# Patient Record
Sex: FEMALE | Race: WHITE | NOT HISPANIC OR LATINO | Employment: OTHER | URBAN - METROPOLITAN AREA
[De-identification: names, ages, dates, MRNs, and addresses within clinical notes are randomized per-mention and may not be internally consistent; named-entity substitution may affect disease eponyms.]

---

## 2017-01-05 ENCOUNTER — OFFICE VISIT (OUTPATIENT)
Dept: OBSTETRICS AND GYNECOLOGY | Facility: CLINIC | Age: 67
End: 2017-01-05
Payer: MEDICARE

## 2017-01-05 VITALS
BODY MASS INDEX: 19.57 KG/M2 | HEIGHT: 64 IN | SYSTOLIC BLOOD PRESSURE: 116 MMHG | DIASTOLIC BLOOD PRESSURE: 68 MMHG | WEIGHT: 114.63 LBS

## 2017-01-05 DIAGNOSIS — Z78.0 MENOPAUSE: ICD-10-CM

## 2017-01-05 DIAGNOSIS — Z11.51 SCREENING FOR HPV (HUMAN PAPILLOMAVIRUS): ICD-10-CM

## 2017-01-05 DIAGNOSIS — Z12.39 BREAST CANCER SCREENING: ICD-10-CM

## 2017-01-05 DIAGNOSIS — M85.80 OSTEOPENIA: ICD-10-CM

## 2017-01-05 DIAGNOSIS — Z01.419 ROUTINE GYNECOLOGICAL EXAMINATION: Primary | ICD-10-CM

## 2017-01-05 PROCEDURE — G0101 CA SCREEN;PELVIC/BREAST EXAM: HCPCS | Mod: S$GLB,,, | Performed by: OBSTETRICS & GYNECOLOGY

## 2017-01-05 PROCEDURE — 88175 CYTOPATH C/V AUTO FLUID REDO: CPT

## 2017-01-05 PROCEDURE — 87624 HPV HI-RISK TYP POOLED RSLT: CPT

## 2017-01-05 PROCEDURE — 99999 PR PBB SHADOW E&M-EST. PATIENT-LVL III: CPT | Mod: PBBFAC,,, | Performed by: OBSTETRICS & GYNECOLOGY

## 2017-01-05 NOTE — PROGRESS NOTES
Chief Complaint   Patient presents with    Well Woman     States her last pap showed scar tissue on the cervix. Has signed a release for records from previous MD.        History of Present Illness: Rosalia Fenrández is a 66 y.o. female that presents today 1/5/2017 for well gyn visit.    Past Medical History   Diagnosis Date    Colon polyps     Diverticulitis     Endometriosis     Infertility, female     Melanoma     MVP (mitral valve prolapse)     Osteoarthritis        Past Surgical History   Procedure Laterality Date    Hysterectomy       Patel, with MESH TAHBSO    Tonsillectomy      Melanoma removal      Hernia repair       bladder hernia    Endometriosis      Colonoscopy       due 2018    Dexa  2016       Current Outpatient Prescriptions   Medication Sig Dispense Refill    ascorbic acid (VITAMIN C) 500 MG tablet Take 500 mg by mouth once daily.      CALCIUM CITRATE/VITAMIN D3 (CALCIUM CITRATE + ORAL) Take 1 tablet by mouth Daily.      ketoconazole (NIZORAL) 2 % cream AAA nasal dryness bid prn 30 g 3    krill oil 500 mg Cap Take 2 capsules by mouth Daily.      LACTOBACILLUS ACIDOPHILUS (PROBIOTIC ORAL) Take by mouth.      meloxicam (MOBIC) 7.5 MG tablet Take 7.5 mg by mouth daily as needed.       metronidazole 0.75% (METROCREAM) 0.75 % Crea AAA face bid 60 g 3    multivitamin (THERAGRAN) per tablet Take 1 tablet by mouth once daily.      VIT C/VIT E/LUTEIN/MIN/OMEGA-3 (OCUVITE ORAL) Take by mouth.       No current facility-administered medications for this visit.        Review of patient's allergies indicates:   Allergen Reactions    Prednisone Other (See Comments)     Blurry vision, not sure if dose was too much for her.       Family History   Problem Relation Age of Onset    Thyroid disease Mother     Dementia Mother     Hypertension Mother     Arthritis Father     Cancer Sister      breast       Social History     Social History    Marital status:      Spouse name: N/A  "   Number of children: N/A    Years of education: N/A     Social History Main Topics    Smoking status: Never Smoker    Smokeless tobacco: Never Used    Alcohol use 12.6 oz/week     21 Glasses of wine per week    Drug use: No    Sexual activity: Yes     Partners: Male     Other Topics Concern    None     Social History Narrative       OB History    Para Term  AB SAB TAB Ectopic Multiple Living   0 0 0 0 0 0 0 0 0 0             Review of Symptoms:  GENERAL: Denies weight gain or weight loss. Feeling well overall.   SKIN: Denies rash or lesions.   HEAD: Denies head injury or headache.   NODES: Denies enlarged lymph nodes.   CHEST: Denies chest pain or shortness of breath.   CARDIOVASCULAR: Denies palpitations or left sided chest pain.   ABDOMEN: No abdominal pain, constipation, diarrhea, nausea, vomiting or rectal bleeding.   URINARY: No frequency, dysuria, hematuria, or burning on urination.  HEMATOLOGIC: No easy bruisability or excessive bleeding.   MUSCULOSKELETAL: Denies joint pain or swelling.     Visit Vitals    /68    Ht 5' 4" (1.626 m)    Wt 52 kg (114 lb 10.2 oz)     Physical Exam:  APPEARANCE: Well nourished, well developed, in no acute distress.  SKIN: Normal skin turgor, no lesions.  NECK: Neck symmetric without masses   RESPIRATORY: Normal respiratory effort with no retractions or use of accessory muscles  CARDIOVASCULAR: Peripheral vascular system with no swelling no varicosities and palpation of pulses normal  LYMPHATIC: No enlargements of the lymph nodes noted in the neck, axillae, or groin  ABDOMEN: Soft. No tenderness or masses. No hepatosplenomegaly. No hernias.  BREASTS: Symmetrical, no skin changes or visible lesions. No palpable masses, nipple discharge or adenopathy bilaterally.  PELVIC: Normal external female genitalia without lesions. Normal hair distribution. Adequate perineal body, normal urethral meatus. Urethra with no masses.  Bladder nontender. Vagina moist " and well rugated without lesions or discharge with right apex with granulation tissue silver nitrate applied.  Adnexa without masses or tenderness. Urethra and bladder normal. PAP DONE  EXTREMITIES: No clubbing cyanosis or edema.    ASSESSMENT/PLAN:  Routine gynecological examination  -     Liquid-based pap smear, screening    Screening for HPV (human papillomavirus)  -     HPV DNA probe, amplified    Menopause    Osteopenia    Breast cancer screening  -     Mammo Digital Screening Bilat With CAD; Future; Expected date: 1/5/17          Patient was counseled today on Pap guidelines, recommendation for pelvic exams every 2 years, mammograms every other year after the age of 40 and annually after the age of 50, Colonoscopy after the age of 50, Dexa Bone Scan and calcium and vitamin D supplementation in menopause and to see her PCP for other health maintenance.   FOLLOW-UP:prn

## 2017-01-05 NOTE — MR AVS SNAPSHOT
[facility]  [address] [place] [address]  Phone: [phone]                  Rosalia Fernández   2017 10:20 AM   Office Visit    Description:  Female : 1950   Provider:  Erinn Aragon MD   Department:  [facility]           Reason for Visit     Well Woman           Diagnoses this Visit        Comments    Routine gynecological examination    -  Primary     Screening for HPV (human papillomavirus)                To Do List           Goals (5 Years of Data)     None      [facility]     [facility] On Call Nurse Care Line -  Assistance  Registered nurses in the [facility] provide clinical advisement, health education, appointment booking, and other advisory services.  Call for this free service at [phone].             Medications           Message regarding Medications     Verify the changes and/or additions to your medication regime listed below are the same as discussed with your clinician today.  If any of these changes or additions are incorrect, please notify your healthcare provider.             Verify that the below list of medications is an accurate representation of the medications you are currently taking.  If none reported, the list may be blank. If incorrect, please contact your healthcare provider. Carry this list with you in case of emergency.           Current Medications     ascorbic acid (VITAMIN C) 500 MG tablet Take 500 mg by mouth once daily.    CALCIUM CITRATE/VITAMIN D3 (CALCIUM CITRATE + ORAL) Take 1 tablet by mouth Daily.    ketoconazole (NIZORAL) 2 % cream AAA nasal dryness bid prn    krill oil 500 mg Cap Take 2 capsules by mouth Daily.    LACTOBACILLUS ACIDOPHILUS (PROBIOTIC ORAL) Take by mouth.    meloxicam (MOBIC) 7.5 MG tablet Take 7.5 mg by mouth daily as needed.     metronidazole 0.75% (METROCREAM) 0.75 % Crea AAA face bid    multivitamin (THERAGRAN) per tablet Take 1 tablet by mouth once daily.    VIT C/VIT  "E/LUTEIN/MIN/OMEGA-3 (OCUVITE ORAL) Take by mouth.           Clinical Reference Information           Vital Signs - Last Recorded  Most recent update: 1/5/2017 10:26 AM by Mariangel Miller LPN    BP Ht Wt BMI       116/68 5' 4" (1.626 m) 52 kg (114 lb 10.2 oz) 19.68 kg/m2       Blood Pressure          Most Recent Value    BP  116/68      Allergies as of 1/5/2017     Prednisone      Immunizations Administered on Date of Encounter - 1/5/2017     None      Orders Placed During Today's Visit      Normal Orders This Visit    HPV DNA probe, amplified     Liquid-based pap smear, screening       "

## 2017-01-18 ENCOUNTER — TELEPHONE (OUTPATIENT)
Dept: OBSTETRICS AND GYNECOLOGY | Facility: CLINIC | Age: 67
End: 2017-01-18

## 2017-01-18 NOTE — TELEPHONE ENCOUNTER
----- Message from RT Edelmira sent at 1/18/2017  4:00 PM CST -----  Contact: Gauri,  EXT [id number] Send Out Lab  Gauri  EXT [id number] Send Out Lab, requesting a call back concerning a test cancellation, thanks.

## 2017-01-25 DIAGNOSIS — Z12.31 VISIT FOR SCREENING MAMMOGRAM: Primary | ICD-10-CM

## 2017-01-27 ENCOUNTER — TELEPHONE (OUTPATIENT)
Dept: OBSTETRICS AND GYNECOLOGY | Facility: CLINIC | Age: 67
End: 2017-01-27

## 2017-01-27 NOTE — TELEPHONE ENCOUNTER
----- Message from Hortensia Zacarias sent at 1/27/2017 11:55 AM CST -----  Contact: [phone]- [phone]  Patient requesting for orders for mammogram to be faxed to [facility].  Thanks!

## 2017-03-13 ENCOUNTER — TELEPHONE (OUTPATIENT)
Dept: DERMATOLOGY | Facility: CLINIC | Age: 67
End: 2017-03-13

## 2017-03-13 NOTE — TELEPHONE ENCOUNTER
Spoke with pt, notified that will we give her a call if any cancellations. Pt verbally understands

## 2017-03-13 NOTE — TELEPHONE ENCOUNTER
----- Message from Jefe Rodriguez sent at 3/13/2017 11:15 AM CDT -----  Pt called stated that she has a spot on her face that she want to get checked/stated has and appt for 5/15 but would like to see her before then/pls call back at [phone]

## 2017-06-15 PROBLEM — G47.62 LEG CRAMPS, SLEEP RELATED: Status: ACTIVE | Noted: 2017-06-15

## 2017-06-19 ENCOUNTER — OFFICE VISIT (OUTPATIENT)
Dept: DERMATOLOGY | Facility: CLINIC | Age: 67
End: 2017-06-19
Payer: MEDICARE

## 2017-06-19 VITALS — HEIGHT: 64 IN | WEIGHT: 113 LBS | BODY MASS INDEX: 19.29 KG/M2

## 2017-06-19 DIAGNOSIS — Z85.820 HISTORY OF MELANOMA: Primary | ICD-10-CM

## 2017-06-19 DIAGNOSIS — L82.0 INFLAMED SEBORRHEIC KERATOSIS: ICD-10-CM

## 2017-06-19 DIAGNOSIS — L57.0 ACTINIC KERATOSES: ICD-10-CM

## 2017-06-19 DIAGNOSIS — Z12.83 SKIN CANCER SCREENING: ICD-10-CM

## 2017-06-19 PROCEDURE — 1126F AMNT PAIN NOTED NONE PRSNT: CPT | Mod: S$GLB,,, | Performed by: DERMATOLOGY

## 2017-06-19 PROCEDURE — 99999 PR PBB SHADOW E&M-EST. PATIENT-LVL II: CPT | Mod: PBBFAC,,, | Performed by: DERMATOLOGY

## 2017-06-19 PROCEDURE — 1159F MED LIST DOCD IN RCRD: CPT | Mod: S$GLB,,, | Performed by: DERMATOLOGY

## 2017-06-19 PROCEDURE — 99214 OFFICE O/P EST MOD 30 MIN: CPT | Mod: 25,S$GLB,, | Performed by: DERMATOLOGY

## 2017-06-19 PROCEDURE — 17003 DESTRUCT PREMALG LES 2-14: CPT | Mod: S$GLB,,, | Performed by: DERMATOLOGY

## 2017-06-19 PROCEDURE — 17000 DESTRUCT PREMALG LESION: CPT | Mod: S$GLB,,, | Performed by: DERMATOLOGY

## 2017-06-19 NOTE — PROGRESS NOTES
"  Subjective:       Patient ID:  Rosalia Fernández is a 66 y.o. female who presents for No chief complaint on file.    Last seen 11/14/2016. Presents today for 6 month follow up.  Has a spot on right face by ear that felt "bumpy" but cannot see and has no symptoms.  Also a spot on back that is same. No symptoms and no treatment.    Hx of melanoma (0.20 mm depth), 3 years ago, left lateral ankle, s/p WLE with skin graft repair- Dr Arguello, in [place].  Sister dx with breast cancer, July 2015. Denies family history melanoma or abnormal nevi.      History AKs, BCC left forehead 1/2015  History mildly atypical nevus left dorsal arm s/p excision 9/2014  moderately dysplastic nevus right lower abdomen s/p excision 2014.  Right lower calf moderate to severely DN s/p excision 3/2014  Mildly atypical nevus right calf, clear on biopsy 2/2014  moderate to focally severe atypical nevus left lateral leg s/p excision 2013  left shoulder mildly atypical excised 3/2013  Numerous ISK s/p biopsy          Onychomycosis  -     Fungal culture , skin, hair, or nails  Ok to continue kerasal for now--Continues use and much improved.    And lesion to back which was benign.    FINAL PATHOLOGIC DIAGNOSIS   1. Skin, left back, shave biopsy:   - MELANOCYTIC NEVUS, INTRADERMAL TYPE.   MICROSCOPIC DESCRIPTION: Sections show nests and cords of melanocytes within the dermis showing   architectural symmetry, maturation, and no cytological atypia.   Diagnosed by: Cookie Orozco M.D.   (Electronically Signed: 2016-11-16 13:56:03)                Review of Systems   Constitutional: Negative for weight loss, weight gain and fatigue.   Skin: Positive for daily sunscreen use, activity-related sunscreen use and wears hat.   Hematologic/Lymphatic: Does not bruise/bleed easily.        Objective:    Physical Exam   Skin:                      Diagram Legend     Erythematous scaling macule/papule c/w actinic keratosis       Vascular papule c/w angioma      " Pigmented verrucoid papule/plaque c/w seborrheic keratosis      Yellow umbilicated papule c/w sebaceous hyperplasia      Irregularly shaped tan macule c/w lentigo     1-2 mm smooth white papules consistent with Milia      Movable subcutaneous cyst with punctum c/w epidermal inclusion cyst      Subcutaneous movable cyst c/w pilar cyst      Firm pink to brown papule c/w dermatofibroma      Pedunculated fleshy papule(s) c/w skin tag(s)      Evenly pigmented macule c/w junctional nevus     Mildly variegated pigmented, slightly irregular-bordered macule c/w mildly atypical nevus      Flesh colored to evenly pigmented papule c/w intradermal nevus       Pink pearly papule/plaque c/w basal cell carcinoma      Erythematous hyperkeratotic cursted plaque c/w SCC      Surgical scar with no sign of skin cancer recurrence      Open and closed comedones      Inflammatory papules and pustules      Verrucoid papule consistent consistent with wart     Erythematous eczematous patches and plaques     Dystrophic onycholytic nail with subungual debris c/w onychomycosis     Umbilicated papule    Erythematous-base heme-crusted tan verrucoid plaque consistent with inflamed seborrheic keratosis     Erythematous Silvery Scaling Plaque c/w Psoriasis     See annotation      Assessment / Plan:        History of melanoma  Area of previous melanoma examined. Site well healed with no signs of recurrence.    Total body skin examination performed today including at least 12 points as noted in physical examination. No lesions suspicious for malignancy noted.      Actinic keratoses  Cryosurgery Procedure Note    Verbal consent from the patient is obtained and the patient is aware of the precancerous quality and need for treatment of these lesions. Liquid nitrogen cryosurgery is applied to the 3 actinic keratoses, as detailed in the physical exam, to produce a freeze injury. The patient is aware that blisters may form and is instructed on wound care  with gentle cleansing and use of vaseline ointment to keep moist until healed. The patient is supplied a handout on cryosurgery and is instructed to call if lesions do not completely resolve. Discussed risk postinflammatory pigmentary changes.       Inflamed seborrheic keratosis- scalp and back  Cryosurgery procedure note:    Verbal consent from the patient is obtained. Liquid nitrogen cryosurgery is applied to 2 lesions to produce a freeze injury. The patient is aware that blisters may form and is instructed on wound care with gentle cleansing and use of vaseline ointment to keep moist until healed. The patient is supplied a handout on cryosurgery and is instructed to call if lesions do not completely resolve. Risk of dyspigmentation discussed.       Skin cancer screening  Area of previous melanoma examined. Site well healed with no signs of recurrence.    Total body skin examination performed today including at least 12 points as noted in physical examination. No lesions suspicious for malignancy noted.             Return in about 6 months (around 12/19/2017).

## 2017-12-04 PROBLEM — E55.9 VITAMIN D DEFICIENCY: Status: ACTIVE | Noted: 2017-12-04

## 2017-12-04 PROBLEM — M79.672 FOOT PAIN, BILATERAL: Status: ACTIVE | Noted: 2017-12-04

## 2017-12-04 PROBLEM — M79.671 FOOT PAIN, BILATERAL: Status: ACTIVE | Noted: 2017-12-04

## 2017-12-08 ENCOUNTER — TELEPHONE (OUTPATIENT)
Dept: DERMATOLOGY | Facility: CLINIC | Age: 67
End: 2017-12-08

## 2017-12-08 NOTE — TELEPHONE ENCOUNTER
----- Message from Yasmeen Donaldson sent at 12/8/2017 11:33 AM CST -----  Contact: self  Pt called to request an appt due to 6 mo f/u. (appt books not open) Pt is normally seen at the [facility] location in [place]. Pt would like the first available appt.    Pt can be reached at [phone].

## 2017-12-08 NOTE — TELEPHONE ENCOUNTER
Informed currently no derm in [place].  We cannot currently book.  Will keep on list for bridger and patient will call back end of December.

## 2018-01-15 ENCOUNTER — OFFICE VISIT (OUTPATIENT)
Dept: DERMATOLOGY | Facility: CLINIC | Age: 68
End: 2018-01-15
Payer: MEDICARE

## 2018-01-15 VITALS — HEIGHT: 64 IN | BODY MASS INDEX: 19.46 KG/M2 | WEIGHT: 114 LBS

## 2018-01-15 DIAGNOSIS — L82.1 SEBORRHEIC KERATOSIS: Primary | ICD-10-CM

## 2018-01-15 DIAGNOSIS — L81.4 LENTIGINES: ICD-10-CM

## 2018-01-15 DIAGNOSIS — D18.00 ANGIOMA: ICD-10-CM

## 2018-01-15 DIAGNOSIS — Z85.820 HISTORY OF MELANOMA: ICD-10-CM

## 2018-01-15 DIAGNOSIS — L85.3 XEROSIS CUTIS: ICD-10-CM

## 2018-01-15 PROCEDURE — 99214 OFFICE O/P EST MOD 30 MIN: CPT | Mod: S$GLB,,, | Performed by: DERMATOLOGY

## 2018-01-15 PROCEDURE — 99999 PR PBB SHADOW E&M-EST. PATIENT-LVL II: CPT | Mod: PBBFAC,,, | Performed by: DERMATOLOGY

## 2018-01-15 NOTE — PROGRESS NOTES
Subjective:       Patient ID:  Rosalia Fernández is a 67 y.o. female who presents for   Chief Complaint   Patient presents with    Skin Check    Dry Skin     Patient presents today for 6 month follow up skin check. No new or concerning moles today. She complains of dry skin for several years- using otc Neutrogena cream with spf 50 & Jordan retinol cream daily     Hx of melanoma (0.20 mm depth), 3 years ago, left lateral ankle, s/p WLE with skin graft repair- Dr Arguello, in [place].  Sister dx with breast cancer, July 2015. Denies family history melanoma or abnormal nevi.      History AKs, BCC left forehead 1/2015  History mildly atypical nevus left dorsal arm s/p excision 9/2014  moderately dysplastic nevus right lower abdomen s/p excision 2014.  Right lower calf moderate to severely DN s/p excision 3/2014  Mildly atypical nevus right calf, clear on biopsy 2/2014  moderate to focally severe atypical nevus left lateral leg s/p excision 2013  left shoulder mildly atypical excised 3/2013           Past Medical History:   Diagnosis Date    Colon polyps     Diverticulitis     Endometriosis     Infertility, female     Melanoma     MVP (mitral valve prolapse)     Osteoarthritis      Review of Systems   Skin: Positive for dry skin, daily sunscreen use and activity-related sunscreen use. Negative for tendency to form keloidal scars and recent sunburn.   Hematologic/Lymphatic: Does not bruise/bleed easily.        Objective:    Physical Exam   Constitutional: She appears well-developed and well-nourished.   Neurological: She is alert and oriented to person, place, and time.   Psychiatric: She has a normal mood and affect.   Skin:   Areas Examined (abnormalities noted in diagram):   Scalp / Hair Palpated and Inspected  Head / Face Inspection Performed  Neck Inspection Performed  Chest / Axilla Inspection Performed  Abdomen Inspection Performed  Back Inspection Performed  RUE Inspected  LUE Inspection Performed  RLE  Inspected  LLE Inspection Performed  Nails and Digits Inspection Performed                   Diagram Legend     Erythematous scaling macule/papule c/w actinic keratosis       Vascular papule c/w angioma      Pigmented verrucoid papule/plaque c/w seborrheic keratosis      Yellow umbilicated papule c/w sebaceous hyperplasia      Irregularly shaped tan macule c/w lentigo     1-2 mm smooth white papules consistent with Milia      Movable subcutaneous cyst with punctum c/w epidermal inclusion cyst      Subcutaneous movable cyst c/w pilar cyst      Firm pink to brown papule c/w dermatofibroma      Pedunculated fleshy papule(s) c/w skin tag(s)      Evenly pigmented macule c/w junctional nevus     Mildly variegated pigmented, slightly irregular-bordered macule c/w mildly atypical nevus      Flesh colored to evenly pigmented papule c/w intradermal nevus       Pink pearly papule/plaque c/w basal cell carcinoma      Erythematous hyperkeratotic cursted plaque c/w SCC      Surgical scar with no sign of skin cancer recurrence      Open and closed comedones      Inflammatory papules and pustules      Verrucoid papule consistent consistent with wart     Erythematous eczematous patches and plaques     Dystrophic onycholytic nail with subungual debris c/w onychomycosis     Umbilicated papule    Erythematous-base heme-crusted tan verrucoid plaque consistent with inflamed seborrheic keratosis     Erythematous Silvery Scaling Plaque c/w Psoriasis     See annotation      Assessment / Plan:        Seborrheic keratosis  These are benign inherited growths without a malignant potential. Reassurance given to patient. No treatment is necessary.     Angioma  This is a benign vascular lesion. Reassurance given. No treatment required.     Lentigines  These are benign hyperpigmented sun induced lesions. Daily sun protection will reduce the number of new lesions    History of melanoma  Area of previous melanoma examined. Site well healed with no  signs of recurrence.  Total body skin examination performed today including at least 12 points as noted in physical examination. No lesions suspicious for malignancy noted.    Xerosis cutis  Encouraged liberal use of moisturizer as this can contribute to pruritis  Recommended Cetaphil cream daily and throughout the day as needed           Follow-up in about 6 months (around 7/15/2018).

## 2018-06-19 PROBLEM — M79.671 FOOT PAIN, BILATERAL: Status: RESOLVED | Noted: 2017-12-04 | Resolved: 2018-06-19

## 2018-06-19 PROBLEM — M79.672 FOOT PAIN, BILATERAL: Status: RESOLVED | Noted: 2017-12-04 | Resolved: 2018-06-19

## 2018-08-28 ENCOUNTER — OFFICE VISIT (OUTPATIENT)
Dept: DERMATOLOGY | Facility: CLINIC | Age: 68
End: 2018-08-28
Payer: MEDICARE

## 2018-08-28 VITALS — BODY MASS INDEX: 19.81 KG/M2 | WEIGHT: 116 LBS | HEIGHT: 64 IN

## 2018-08-28 DIAGNOSIS — L71.9 ROSACEA: ICD-10-CM

## 2018-08-28 DIAGNOSIS — Z87.898 HISTORY OF ATYPICAL NEVUS: ICD-10-CM

## 2018-08-28 DIAGNOSIS — L71.9 ACNE ROSACEA: ICD-10-CM

## 2018-08-28 DIAGNOSIS — L82.0 INFLAMED SEBORRHEIC KERATOSIS: ICD-10-CM

## 2018-08-28 DIAGNOSIS — Z85.820 HISTORY OF MELANOMA: Primary | ICD-10-CM

## 2018-08-28 DIAGNOSIS — Z12.83 SKIN CANCER SCREENING: ICD-10-CM

## 2018-08-28 PROCEDURE — 99214 OFFICE O/P EST MOD 30 MIN: CPT | Mod: 25,S$GLB,, | Performed by: DERMATOLOGY

## 2018-08-28 PROCEDURE — 17110 DESTRUCTION B9 LES UP TO 14: CPT | Mod: S$GLB,,, | Performed by: DERMATOLOGY

## 2018-08-28 PROCEDURE — 99999 PR PBB SHADOW E&M-EST. PATIENT-LVL III: CPT | Mod: PBBFAC,,, | Performed by: DERMATOLOGY

## 2018-08-28 RX ORDER — METRONIDAZOLE 7.5 MG/G
CREAM TOPICAL
Qty: 60 G | Refills: 3 | Status: SHIPPED | OUTPATIENT
Start: 2018-08-28 | End: 2021-09-20

## 2018-10-09 ENCOUNTER — TELEPHONE (OUTPATIENT)
Dept: NEUROLOGY | Facility: CLINIC | Age: 68
End: 2018-10-09

## 2018-10-09 NOTE — TELEPHONE ENCOUNTER
----- Message from Tamika Nagy MD sent at 10/8/2018  7:06 PM CDT -----  I spoke to the ER about this lady and we need to get her worked into an urgent slot in the next couple of weeks. Thanks.

## 2018-10-09 NOTE — TELEPHONE ENCOUNTER
----- Message from Olvin Ryan sent at 10/9/2018  8:08 AM CDT -----  Contact: pt  Pt is calling to schedule a Hospital Follow up appt(bilateral arm numbness)  Call Back#[phone]  Thanks

## 2018-10-23 ENCOUNTER — OFFICE VISIT (OUTPATIENT)
Dept: NEUROLOGY | Facility: CLINIC | Age: 68
End: 2018-10-23
Payer: MEDICARE

## 2018-10-23 VITALS
WEIGHT: 116.19 LBS | HEART RATE: 74 BPM | DIASTOLIC BLOOD PRESSURE: 67 MMHG | HEIGHT: 64 IN | SYSTOLIC BLOOD PRESSURE: 115 MMHG | BODY MASS INDEX: 19.84 KG/M2 | RESPIRATION RATE: 20 BRPM

## 2018-10-23 DIAGNOSIS — Z85.820 HISTORY OF MELANOMA: ICD-10-CM

## 2018-10-23 DIAGNOSIS — Z81.8 FAMILY HISTORY OF DEMENTIA: ICD-10-CM

## 2018-10-23 DIAGNOSIS — G45.9 TIA (TRANSIENT ISCHEMIC ATTACK): Primary | ICD-10-CM

## 2018-10-23 DIAGNOSIS — G47.62 LEG CRAMPS, SLEEP RELATED: ICD-10-CM

## 2018-10-23 PROCEDURE — 99215 OFFICE O/P EST HI 40 MIN: CPT | Mod: PBBFAC,PN | Performed by: PSYCHIATRY & NEUROLOGY

## 2018-10-23 PROCEDURE — 99205 OFFICE O/P NEW HI 60 MIN: CPT | Mod: S$PBB,,, | Performed by: PSYCHIATRY & NEUROLOGY

## 2018-10-23 PROCEDURE — 1101F PT FALLS ASSESS-DOCD LE1/YR: CPT | Mod: ,,, | Performed by: PSYCHIATRY & NEUROLOGY

## 2018-10-23 PROCEDURE — 99999 PR PBB SHADOW E&M-EST. PATIENT-LVL V: CPT | Mod: PBBFAC,,, | Performed by: PSYCHIATRY & NEUROLOGY

## 2018-10-23 RX ORDER — NAPROXEN SODIUM 220 MG/1
81 TABLET, FILM COATED ORAL DAILY
Refills: 0 | COMMUNITY
Start: 2018-10-23 | End: 2018-12-10

## 2018-10-23 NOTE — PATIENT INSTRUCTIONS
Can try magnesium 400 mg daily for cramp prevention    This could have been a TIA (transient ischemic attack) of the spinal cord. We will get pictures of your head and neck and pictures of the blood vessels of the head and neck.     Start aspirin 81 mg daily.     If it happens again, go to the ER.
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
## 2018-10-25 ENCOUNTER — PATIENT MESSAGE (OUTPATIENT)
Dept: NEUROLOGY | Facility: CLINIC | Age: 68
End: 2018-10-25

## 2018-11-01 ENCOUNTER — HOSPITAL ENCOUNTER (OUTPATIENT)
Dept: RADIOLOGY | Facility: HOSPITAL | Age: 68
Discharge: HOME | End: 2018-11-01
Attending: PSYCHIATRY & NEUROLOGY
Payer: MEDICARE

## 2018-11-01 DIAGNOSIS — G45.9 TIA (TRANSIENT ISCHEMIC ATTACK): ICD-10-CM

## 2018-11-01 DIAGNOSIS — Z85.820 HISTORY OF MELANOMA: ICD-10-CM

## 2018-11-01 PROCEDURE — 72156 MRI NECK SPINE W/O & W/DYE: CPT | Mod: 26,,, | Performed by: RADIOLOGY

## 2018-11-01 PROCEDURE — 70549 MR ANGIOGRAPH NECK W/O&W/DYE: CPT | Mod: TC

## 2018-11-01 PROCEDURE — 70544 MR ANGIOGRAPHY HEAD W/O DYE: CPT | Mod: TC

## 2018-11-01 PROCEDURE — 70544 MR ANGIOGRAPHY HEAD W/O DYE: CPT | Mod: 26,,, | Performed by: RADIOLOGY

## 2018-11-01 PROCEDURE — 70553 MRI BRAIN STEM W/O & W/DYE: CPT | Mod: TC

## 2018-11-01 PROCEDURE — A9585 GADOBUTROL INJECTION: HCPCS | Performed by: PSYCHIATRY & NEUROLOGY

## 2018-11-01 PROCEDURE — 70553 MRI BRAIN STEM W/O & W/DYE: CPT | Mod: 26,,, | Performed by: RADIOLOGY

## 2018-11-01 PROCEDURE — 70549 MR ANGIOGRAPH NECK W/O&W/DYE: CPT | Mod: 26,,, | Performed by: RADIOLOGY

## 2018-11-01 PROCEDURE — 72156 MRI NECK SPINE W/O & W/DYE: CPT | Mod: TC

## 2018-11-01 PROCEDURE — 25500020 PHARM REV CODE 255: Performed by: PSYCHIATRY & NEUROLOGY

## 2018-11-01 RX ORDER — SODIUM CHLORIDE 9 MG/ML
INJECTION, SOLUTION INTRAVENOUS
Status: DISPENSED
Start: 2018-11-01 | End: 2018-11-01

## 2018-11-01 RX ORDER — GADOBUTROL 604.72 MG/ML
INJECTION INTRAVENOUS
Status: DISPENSED
Start: 2018-11-01 | End: 2018-11-01

## 2018-11-01 RX ORDER — GADOBUTROL 604.72 MG/ML
5 INJECTION INTRAVENOUS
Status: COMPLETED | OUTPATIENT
Start: 2018-11-01 | End: 2018-11-01

## 2018-11-01 RX ADMIN — GADOBUTROL 5 ML: 604.72 INJECTION INTRAVENOUS at 10:11

## 2018-11-09 ENCOUNTER — TELEPHONE (OUTPATIENT)
Dept: NEUROLOGY | Facility: CLINIC | Age: 68
End: 2018-11-09

## 2018-11-09 NOTE — TELEPHONE ENCOUNTER
----- Message from Arleen Valles sent at 11/9/2018  1:58 PM CST -----  Contact: Ms Solorzano [facility] [phone]  Ms Solorzano from [facility] call to speak to the nurse regarding a pending  peer to peer review from the doctor and would like a call back today due to pt being schedule for 11/10/2018.    Ms Solorzano can be reached at [phone].    Thanks

## 2018-12-10 ENCOUNTER — OFFICE VISIT (OUTPATIENT)
Dept: NEUROLOGY | Facility: CLINIC | Age: 68
End: 2018-12-10
Payer: MEDICARE

## 2018-12-10 VITALS
HEART RATE: 73 BPM | WEIGHT: 116.31 LBS | HEIGHT: 64 IN | DIASTOLIC BLOOD PRESSURE: 67 MMHG | SYSTOLIC BLOOD PRESSURE: 120 MMHG | BODY MASS INDEX: 19.86 KG/M2 | RESPIRATION RATE: 20 BRPM

## 2018-12-10 DIAGNOSIS — Z85.820 HISTORY OF MELANOMA: ICD-10-CM

## 2018-12-10 DIAGNOSIS — R29.898 WEAKNESS OF BOTH ARMS: ICD-10-CM

## 2018-12-10 DIAGNOSIS — Z81.8 FAMILY HISTORY OF DEMENTIA: ICD-10-CM

## 2018-12-10 DIAGNOSIS — Q27.9 VENOUS ANOMALY: Primary | ICD-10-CM

## 2018-12-10 PROCEDURE — 99215 OFFICE O/P EST HI 40 MIN: CPT | Mod: S$GLB,,, | Performed by: PSYCHIATRY & NEUROLOGY

## 2018-12-10 PROCEDURE — 99999 PR PBB SHADOW E&M-EST. PATIENT-LVL III: CPT | Mod: PBBFAC,,, | Performed by: PSYCHIATRY & NEUROLOGY

## 2018-12-10 PROCEDURE — 1101F PT FALLS ASSESS-DOCD LE1/YR: CPT | Mod: S$GLB,,, | Performed by: PSYCHIATRY & NEUROLOGY

## 2018-12-10 NOTE — PROGRESS NOTES
Date: 12/10/2018    Patient ID: Rosalia Fernández is a 67 y.o. female.    Chief Complaint: Extremity Weakness      History of Present Illness:  Ms. Fernández is a 67 y.o. female who presents for followup of transient episodes of bilateral arm weakness.     Workup revealed no c-spine abnormality that would explain the arm weakness. I thought initially this might have been a TIA of the spinal cord and recommended aspirin 81 mg daily. MRA revealed no concerning stenosis or cause. 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 Because of this and risk for bleeding, I advised her to stop the aspirin we started previously.     She has not had any further episodes of arm weakness. Her  notes she has had some mid back pain and she picks up things that are too heavy for her.     Review of Systems:   Comprehensive review of systems is negative except as mentioned in the HPI    Allergies:  Review of patient's allergies indicates:   Allergen Reactions    Prednisone Other (See Comments)     Blurry vision, not sure if dose was too much for her.       Current Medications:  Current Outpatient Medications   Medication Sig Dispense Refill    ascorbic acid (VITAMIN C) 500 MG tablet Take 500 mg by mouth once daily.      CALCIUM CITRATE/VITAMIN D3 (CALCIUM CITRATE + ORAL) Take 1 tablet by mouth Daily.      coQ10, ubiquinol, 100 mg Cap Take 1 tablet by mouth once daily.      diclofenac sodium (VOLTAREN) 1 % Gel Apply 2 g topically 4 (four) times daily. As needed for painful fingers/joints 100 g 3    ketoconazole (NIZORAL) 2 % cream AAA nasal dryness bid prn 30 g 3    krill oil 500 mg Cap Take 2 capsules by mouth Daily.      LACTOBACILLUS ACIDOPHILUS (PROBIOTIC ORAL) Take by mouth.      metronidazole 0.75% (METROCREAM) 0.75 % Crea AAA face bid 60 g 3    multivitamin (THERAGRAN) per tablet Take 1 tablet by mouth  "once daily.       No current facility-administered medications for this visit.        Past Medical History:  Past Medical History:   Diagnosis Date    Colon polyps     Diverticulitis     Endometriosis     Infertility, female     Melanoma     MVP (mitral valve prolapse)     Osteoarthritis        Past Surgical History:  Past Surgical History:   Procedure Laterality Date    COLONOSCOPY      due 2018    DEXA  2016    Endometriosis      HERNIA REPAIR      bladder hernia    HYSTERECTOMY      Patel, with MESH TAHBSO    Melanoma Removal      OOPHORECTOMY      TONSILLECTOMY         Family History:  family history includes Arthritis in her father; Breast cancer (age of onset: 68) in her sister; Cancer in her sister; Dementia in her mother; Hypertension in her mother; Thyroid disease in her mother.    Social History:   reports that  has never smoked. she has never used smokeless tobacco. She reports that she drinks about 12.6 oz of alcohol per week. She reports that she does not use drugs.    Physical Exam:  Vitals:    12/10/18 1344   BP: 120/67   Pulse: 73   Resp: 20   Weight: 52.8 kg (116 lb 4.8 oz)   Height: 5' 4" (1.626 m)   PainSc:   3   PainLoc: Back     Body mass index is 19.96 kg/m².  General: Well developed, well nourished.  No acute distress.  Musculoskeletal: No obvious joint deformities, moves all extremities well.  Peripheral vascular: No edema noted    Neurological Exam:  Mental status: Awake and alert  Speech language: No dysarthria or aphasia on conversation  Cranial nerves: Face symmetric  Motor: 5 out of 5 strength throughout the upper and lower extremities bilaterally. Normal bulk and tone.   Sensation: Intact to vibration bilaterally.  DTR: 2+ at the knees and biceps bilaterally.  Coordination: No ataxia. No tremor.   Gait: Normal gait    Data:  I have personally reviewed the referring provider's notes, labs, & imaging made available to me today.     Labs:  CBC:   Lab Results   Component " Value Date    WBC 7.24 10/08/2018    HGB 14.0 10/08/2018    HCT 42.4 10/08/2018     10/08/2018    MCV 92 10/08/2018    RDW 11.9 10/08/2018     BMP:   Lab Results   Component Value Date     10/08/2018    K 4.0 10/08/2018     10/08/2018    CO2 30 10/08/2018    BUN 24 (H) 10/08/2018    CREATININE 0.77 10/08/2018     10/08/2018    CALCIUM 9.8 10/08/2018    MG 2.0 10/08/2018    PHOS 3.8 10/08/2018     LFTS;   Lab Results   Component Value Date    PROT 7.4 10/08/2018    ALBUMIN 4.5 10/08/2018    BILITOT 0.3 10/08/2018    AST 21 10/08/2018    ALKPHOS 82 10/08/2018    ALT 36 10/08/2018     COAGS: No results found for: INR, PROTIME, PTT  FLP:   Lab Results   Component Value Date    CHOL 201 (H) 06/15/2018    HDL 65 06/15/2018    LDLCALC 118.4 06/15/2018    TRIG 88 06/15/2018    CHOLHDL 32.3 06/15/2018         Imaging:  I have personally reviewed the imaging, MRI brain shows a 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 Fernández is a 67 y.o. female who presents for followup of episodes of transient bilateral upper extremity weakness. The only revealing finding on workup is that of two old areas of microhemorrhage and capillary telangectasia in the kalyan. I advised her not to use aspirin at this time. If another episode occurs and is longer than 10 minutes, she is to go to the ER for repeat head imaging. We will repeat the imaging in 6 months to ensure no change. I will see her back in followup after this.     She was concerned about the atrophy of the brain and her family history of dementia. I discussed that for her age, the degree of atrophy is expected and I don't see anything concerning. She also has a history of melanoma on the foot and the initial radiology report mentioned the possibility of melanoma but we discussed the repeat brain MRI did not show progression or concern for that. This  is felt to be a blood vessel abnormality.     Venous anomaly  -     MRI Brain W WO Contrast; Future; Expected date: 06/10/2019  -     Creatinine, serum; Future; Expected date: 12/10/2018    History of melanoma    Family history of dementia    Weakness of both arms

## 2019-01-07 ENCOUNTER — TELEPHONE (OUTPATIENT)
Dept: NEUROLOGY | Facility: CLINIC | Age: 69
End: 2019-01-07

## 2019-01-07 NOTE — TELEPHONE ENCOUNTER
----- Message from Leila Miller sent at 1/7/2019 10:26 AM CST -----  Contact: Patient  Type: Needs Medical Advice    Who Called:  Patient  Best Call Back Number:   Additional Information: Patient is calling to schedule a 6 month follow up appt on 6/13/19. System is not allowing me to schedule that far out. Please call back and advise.

## 2019-01-09 ENCOUNTER — TELEPHONE (OUTPATIENT)
Dept: NEUROLOGY | Facility: CLINIC | Age: 69
End: 2019-01-09

## 2019-01-09 NOTE — TELEPHONE ENCOUNTER
----- Message from Paulina Parkinson sent at 1/8/2019  3:18 PM CST -----  Contact: Self   Pt missed a phone call from the nurse and would like a call back at her earliest convenience         Pt can be contacted at [phone]

## 2019-03-26 ENCOUNTER — TELEPHONE (OUTPATIENT)
Dept: NEUROLOGY | Facility: CLINIC | Age: 69
End: 2019-03-26

## 2019-03-26 NOTE — TELEPHONE ENCOUNTER
----- Message from Héctor Berkowitz sent at 3/26/2019 11:53 AM CDT -----  Type: Needs Medical Advice    Who Called:  Patient  Best Call Back Number: [phone] (home)   Additional Information: Patient's left hand is having tremors. Please call to advise.

## 2019-05-13 ENCOUNTER — OFFICE VISIT (OUTPATIENT)
Dept: OBSTETRICS AND GYNECOLOGY | Facility: CLINIC | Age: 69
End: 2019-05-13
Payer: MEDICARE

## 2019-05-13 VITALS
WEIGHT: 117.06 LBS | HEIGHT: 64 IN | RESPIRATION RATE: 15 BRPM | DIASTOLIC BLOOD PRESSURE: 64 MMHG | BODY MASS INDEX: 19.99 KG/M2 | SYSTOLIC BLOOD PRESSURE: 116 MMHG

## 2019-05-13 DIAGNOSIS — Z01.419 ENCOUNTER FOR GYNECOLOGICAL EXAMINATION WITHOUT ABNORMAL FINDING: Primary | ICD-10-CM

## 2019-05-13 PROCEDURE — G0101 PR CA SCREEN;PELVIC/BREAST EXAM: ICD-10-PCS | Mod: S$GLB,,, | Performed by: OBSTETRICS & GYNECOLOGY

## 2019-05-13 PROCEDURE — 99999 PR PBB SHADOW E&M-EST. PATIENT-LVL III: CPT | Mod: PBBFAC,,, | Performed by: OBSTETRICS & GYNECOLOGY

## 2019-05-13 PROCEDURE — G0101 CA SCREEN;PELVIC/BREAST EXAM: HCPCS | Mod: S$GLB,,, | Performed by: OBSTETRICS & GYNECOLOGY

## 2019-05-13 PROCEDURE — 99999 PR PBB SHADOW E&M-EST. PATIENT-LVL III: ICD-10-PCS | Mod: PBBFAC,,, | Performed by: OBSTETRICS & GYNECOLOGY

## 2019-05-13 NOTE — PROGRESS NOTES
Chief Complaint   Patient presents with    Well Woman     no problems     Hot Flashes     states warm not hot and gets only every 4 or 5 months      History of Present Illness: Rosalia Fernández is a 68 y.o. female that presents today 5/13/2019 for well gyn visit. She is not sexually active.     Past Medical History:   Diagnosis Date    Colon polyps     Diverticulitis     Endometriosis     Infertility, female     Melanoma     MVP (mitral valve prolapse)     Osteoarthritis        Past Surgical History:   Procedure Laterality Date    COLONOSCOPY      due 2018    DEXA  2016    Endometriosis      HERNIA REPAIR      bladder hernia    HYSTERECTOMY      Patel, with MESH TAHBSO    Melanoma Removal      OOPHORECTOMY      TONSILLECTOMY         Current Outpatient Medications   Medication Sig Dispense Refill    ascorbic acid (VITAMIN C) 500 MG tablet Take 500 mg by mouth once daily.      CALCIUM CITRATE/VITAMIN D3 (CALCIUM CITRATE + ORAL) Take 1 tablet by mouth Daily.      coQ10, ubiquinol, 100 mg Cap Take 1 tablet by mouth once daily.      ketoconazole (NIZORAL) 2 % cream AAA nasal dryness bid prn 30 g 3    krill oil 500 mg Cap Take 2 capsules by mouth Daily.      LACTOBACILLUS ACIDOPHILUS (PROBIOTIC ORAL) Take by mouth.      metronidazole 0.75% (METROCREAM) 0.75 % Crea AAA face bid 60 g 3    multivitamin (THERAGRAN) per tablet Take 1 tablet by mouth once daily.      diclofenac sodium (VOLTAREN) 1 % Gel Apply 2 g topically 4 (four) times daily. As needed for painful fingers/joints 100 g 3     No current facility-administered medications for this visit.        Review of patient's allergies indicates:   Allergen Reactions    Prednisone Other (See Comments)     Blurry vision, not sure if dose was too much for her.       Family History   Problem Relation Age of Onset    Thyroid disease Mother     Dementia Mother     Hypertension Mother     Arthritis Father     Cancer Sister         breast     Breast cancer Sister 68       Social History     Socioeconomic History    Marital status:      Spouse name: Not on file    Number of children: Not on file    Years of education: Not on file    Highest education level: Not on file   Occupational History    Not on file   Social Needs    Financial resource strain: Not on file    Food insecurity:     Worry: Not on file     Inability: Not on file    Transportation needs:     Medical: Not on file     Non-medical: Not on file   Tobacco Use    Smoking status: Never Smoker    Smokeless tobacco: Never Used   Substance and Sexual Activity    Alcohol use: Yes     Alcohol/week: 12.6 oz     Types: 21 Glasses of wine per week    Drug use: No    Sexual activity: Yes     Partners: Male   Lifestyle    Physical activity:     Days per week: Not on file     Minutes per session: Not on file    Stress: Not on file   Relationships    Social connections:     Talks on phone: Not on file     Gets together: Not on file     Attends [employer] service: Not on file     Active member of club or organization: Not on file     Attends meetings of clubs or organizations: Not on file     Relationship status: Not on file   Other Topics Concern    Not on file   Social History Narrative    Not on file       OB History    Para Term  AB Living   0 0 0 0 0 0   SAB TAB Ectopic Multiple Live Births   0 0 0 0         Review of Symptoms:  GENERAL: Denies weight gain or weight loss. Feeling well overall.   SKIN: Denies rash or lesions.   HEAD: Denies head injury or headache.   NODES: Denies enlarged lymph nodes.   CHEST: Denies chest pain or shortness of breath.   CARDIOVASCULAR: Denies palpitations or left sided chest pain.   ABDOMEN: No abdominal pain, constipation, diarrhea, nausea, vomiting or rectal bleeding.   URINARY: No frequency, dysuria, hematuria, or burning on urination.  HEMATOLOGIC: No easy bruisability or excessive bleeding.   MUSCULOSKELETAL: Denies joint pain  "or swelling.     /64   Resp 15   Ht 5' 4" (1.626 m)   Wt 53.1 kg (117 lb 1 oz)   Physical Exam:  APPEARANCE: Well nourished, well developed, in no acute distress.  SKIN: Normal skin turgor, no lesions.  NECK: Neck symmetric without masses   RESPIRATORY: Normal respiratory effort with no retractions or use of accessory muscles  CARDIOVASCULAR: Peripheral vascular system with no swelling no varicosities and palpation of pulses normal  LYMPHATIC: No enlargements of the lymph nodes noted in the neck, axillae, or groin  ABDOMEN: Soft. No tenderness or masses. No hepatosplenomegaly. No hernias.  BREASTS: Symmetrical, no skin changes or visible lesions. No palpable masses, nipple discharge or adenopathy bilaterally.  PELVIC: Normal external female genitalia without lesions. Normal hair distribution. Adequate perineal body, normal urethral meatus. Urethra with no masses.  Bladder nontender. Vagina moist and well rugated without lesions or discharge. No significant cystocele or rectocele.  Adnexa without masses or tenderness. Urethra and bladder normal.   EXTREMITIES: No clubbing cyanosis or edema.    ASSESSMENT/PLAN:  Encounter for gynecological examination without abnormal finding          Patient was counseled today on Pap guidelines. We discussed the discontinuing the pap smear after hysterectomy except in certain high risk cases.  We discussed the need for pelvic exams.   We discussed STD screening if at high risk for an STD.  We discussed breast cancer screening with mammograms every other year after the age of 40 and annually after the age of 50.    We discussed colon cancer screening.   Osteoporosis screening with the Dexa Bone Scan discussed when indicated.   She will see her PCP for other health maintenance.       FOLLOW-UP:prn      "

## 2019-05-18 ENCOUNTER — PATIENT MESSAGE (OUTPATIENT)
Dept: NEUROLOGY | Facility: CLINIC | Age: 69
End: 2019-05-18

## 2019-05-27 ENCOUNTER — TELEPHONE (OUTPATIENT)
Dept: NEUROLOGY | Facility: CLINIC | Age: 69
End: 2019-05-27

## 2019-05-27 NOTE — TELEPHONE ENCOUNTER
----- Message from Cecille Hickey sent at 5/27/2019  9:04 AM CDT -----  Contact: self [phone]  Please call her regarding the tests you ordered, she is confused.  Thank you!

## 2019-06-11 DIAGNOSIS — Q27.9 VENOUS MALFORMATION: Primary | ICD-10-CM

## 2019-06-12 ENCOUNTER — HOSPITAL ENCOUNTER (OUTPATIENT)
Dept: RADIOLOGY | Facility: HOSPITAL | Age: 69
Discharge: HOME | End: 2019-06-12
Attending: PSYCHIATRY & NEUROLOGY
Payer: MEDICARE

## 2019-06-12 DIAGNOSIS — Q27.9 VENOUS ANOMALY: ICD-10-CM

## 2019-06-12 PROCEDURE — 25500020 PHARM REV CODE 255: Mod: PO | Performed by: PSYCHIATRY & NEUROLOGY

## 2019-06-12 PROCEDURE — 70553 MRI BRAIN W WO CONTRAST: ICD-10-PCS | Mod: 26,,, | Performed by: RADIOLOGY

## 2019-06-12 PROCEDURE — A9585 GADOBUTROL INJECTION: HCPCS | Mod: PO | Performed by: PSYCHIATRY & NEUROLOGY

## 2019-06-12 PROCEDURE — 70553 MRI BRAIN STEM W/O & W/DYE: CPT | Mod: 26,,, | Performed by: RADIOLOGY

## 2019-06-12 PROCEDURE — 70553 MRI BRAIN STEM W/O & W/DYE: CPT | Mod: TC,PO

## 2019-06-12 RX ORDER — GADOBUTROL 604.72 MG/ML
5 INJECTION INTRAVENOUS
Status: COMPLETED | OUTPATIENT
Start: 2019-06-12 | End: 2019-06-12

## 2019-06-12 RX ADMIN — GADOBUTROL 5 ML: 604.72 INJECTION INTRAVENOUS at 03:06

## 2019-06-21 ENCOUNTER — OFFICE VISIT (OUTPATIENT)
Dept: NEUROLOGY | Facility: CLINIC | Age: 69
End: 2019-06-21
Payer: MEDICARE

## 2019-06-21 VITALS
SYSTOLIC BLOOD PRESSURE: 137 MMHG | RESPIRATION RATE: 20 BRPM | WEIGHT: 115.88 LBS | BODY MASS INDEX: 19.78 KG/M2 | DIASTOLIC BLOOD PRESSURE: 75 MMHG | HEIGHT: 64 IN | HEART RATE: 81 BPM

## 2019-06-21 DIAGNOSIS — R90.89 ABNORMAL BRAIN MRI: ICD-10-CM

## 2019-06-21 DIAGNOSIS — M79.605 LEFT LEG PAIN: ICD-10-CM

## 2019-06-21 DIAGNOSIS — R29.90 EPISODE OF TRANSIENT NEUROLOGIC SYMPTOMS: Primary | ICD-10-CM

## 2019-06-21 PROCEDURE — 99214 OFFICE O/P EST MOD 30 MIN: CPT | Mod: S$GLB,,, | Performed by: PSYCHIATRY & NEUROLOGY

## 2019-06-21 PROCEDURE — 1101F PR PT FALLS ASSESS DOC 0-1 FALLS W/OUT INJ PAST YR: ICD-10-PCS | Mod: S$GLB,,, | Performed by: PSYCHIATRY & NEUROLOGY

## 2019-06-21 PROCEDURE — 99999 PR PBB SHADOW E&M-EST. PATIENT-LVL III: ICD-10-PCS | Mod: PBBFAC,,, | Performed by: PSYCHIATRY & NEUROLOGY

## 2019-06-21 PROCEDURE — 99214 PR OFFICE/OUTPT VISIT, EST, LEVL IV, 30-39 MIN: ICD-10-PCS | Mod: S$GLB,,, | Performed by: PSYCHIATRY & NEUROLOGY

## 2019-06-21 PROCEDURE — 99999 PR PBB SHADOW E&M-EST. PATIENT-LVL III: CPT | Mod: PBBFAC,,, | Performed by: PSYCHIATRY & NEUROLOGY

## 2019-06-21 PROCEDURE — 1101F PT FALLS ASSESS-DOCD LE1/YR: CPT | Mod: S$GLB,,, | Performed by: PSYCHIATRY & NEUROLOGY

## 2019-06-21 NOTE — PROGRESS NOTES
Date: 6/21/2019    Patient ID: Rosalia Fernández is a 68 y.o. female.    Chief Complaint: Stroke      History of Present Illness:  Ms. Fernández is a 68 y.o. female who presents for followup of episodes of transient bilateral arm weakness.     Interval history: We repeated the MRI of the brain which showed a stable pontine capillary telangiectasia. No further episodes of arm weakness. She did have a trip with a bad fall and large scalp hematoma last month. She broke her finger as well and is still in a splint. She still has some numbness on her forehead.     She has brief moments of significant pain in her left thigh and left ankle. These are brief but severe. They occur for a split second every 1-2 weeks. No low back pain. When she is lying down, sometimes her right ankle falls asleep and if she stands up and moves around it improves.     Prior documentation:   At the end of September 28, 2018, she had an episode of bilateral arm weakness. She was playing golf and had sudden onset of bilateral arm weakness. This was severe and she couldn't lift her hands up even to touch her nose. No numbness. The left arm was affected more so but couldn't get either up to her nose. It lasted a few minutes and then resolved completely. No vision changes. No speech changes. No leg weakness. No pain.      The next episode occurred October 8, 2018. She was shopping and just suddenly had bilateral arm weakness. It was not as severe but again lasted less than 5 minutes and resolved. She went to ER and CT head was negative.     Workup revealed no c-spine abnormality that would explain the arm weakness. I thought initially this might have been a TIA of the spinal cord and recommended aspirin 81 mg daily. MRA revealed no concerning stenosis or cause. 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 Because of this and risk for  bleeding, I advised her to stop the aspirin we started previously.      She has not had any further episodes of arm weakness. Her  notes she has had some mid back pain and she picks up things that are too heavy for her.     Review of Systems:   All other systems were reviewed and negative except as mentioned in the HPI    Allergies:  Review of patient's allergies indicates:   Allergen Reactions    Prednisone Other (See Comments)     Blurry vision, not sure if dose was too much for her.       Current Medications:  Current Outpatient Medications   Medication Sig Dispense Refill    ascorbic acid (VITAMIN C) 500 MG tablet Take 500 mg by mouth once daily.      CALCIUM CITRATE/VITAMIN D3 (CALCIUM CITRATE + ORAL) Take 1 tablet by mouth Daily.      coQ10, ubiquinol, 100 mg Cap Take 1 tablet by mouth once daily.      diclofenac sodium (VOLTAREN) 1 % Gel Apply 2 g topically 4 (four) times daily. As needed for painful fingers/joints 100 g 3    ketoconazole (NIZORAL) 2 % cream AAA nasal dryness bid prn 30 g 3    krill oil 500 mg Cap Take 2 capsules by mouth Daily.      LACTOBACILLUS ACIDOPHILUS (PROBIOTIC ORAL) Take by mouth.      metronidazole 0.75% (METROCREAM) 0.75 % Crea AAA face bid 60 g 3    multivitamin (THERAGRAN) per tablet Take 1 tablet by mouth once daily.       No current facility-administered medications for this visit.        Past Medical History:  Past Medical History:   Diagnosis Date    Colon polyps     Diverticulitis     Endometriosis     Infertility, female     Melanoma     MVP (mitral valve prolapse)     Osteoarthritis        Past Surgical History:  Past Surgical History:   Procedure Laterality Date    COLONOSCOPY      due 2018    DEXA  2016    Endometriosis      HERNIA REPAIR      bladder hernia    HYSTERECTOMY      Patel, with MESH TAHBSO    Melanoma Removal      OOPHORECTOMY      TONSILLECTOMY         Family History:  family history includes Arthritis in her father; Breast  "cancer (age of onset: 68) in her sister; Cancer in her sister; Dementia in her mother; Hypertension in her mother; Thyroid disease in her mother.    Social History:   reports that she has never smoked. She has never used smokeless tobacco. She reports that she drinks about 12.6 oz of alcohol per week. She reports that she does not use drugs.    Physical Exam:  Vitals:    06/21/19 0959   BP: 137/75   Pulse: 81   Resp: 20   Weight: 52.6 kg (115 lb 13.6 oz)   Height: 5' 4" (1.626 m)   PainSc: 0-No pain     Body mass index is 19.89 kg/m².  General: Well developed, well nourished.  No acute distress.  Musculoskeletal: No obvious joint deformities, moves all extremities well.  Peripheral vascular: No edema noted    Neurological Exam:  Mental status: Awake and alert  Speech language: No dysarthria or aphasia on conversation  Cranial nerves: Face symmetric  Motor: Moves all extremities well, 5/5 bilateral LE  Reflexes: 2+ bilateral knees  Sensation: intact bilateral toes  Coordination: No ataxia. No tremor.     Data:  I have personally reviewed other provider's notes, labs, & imaging made available to me today.       Labs:  CBC:   Lab Results   Component Value Date    WBC 7.24 10/08/2018    HGB 14.0 10/08/2018    HCT 42.4 10/08/2018     10/08/2018    MCV 92 10/08/2018    RDW 11.9 10/08/2018     BMP:   Lab Results   Component Value Date     10/08/2018    K 4.0 10/08/2018     10/08/2018    CO2 30 10/08/2018    BUN 24 (H) 10/08/2018    CREATININE 0.8 06/12/2019     10/08/2018    CALCIUM 9.8 10/08/2018    MG 2.0 10/08/2018    PHOS 3.8 10/08/2018     LFTS;   Lab Results   Component Value Date    PROT 7.4 10/08/2018    ALBUMIN 4.5 10/08/2018    BILITOT 0.3 10/08/2018    AST 21 10/08/2018    ALKPHOS 82 10/08/2018    ALT 36 10/08/2018     COAGS: No results found for: INR, PROTIME, PTT  FLP:   Lab Results   Component Value Date    CHOL 201 (H) 06/15/2018    HDL 65 06/15/2018    LDLCALC 118.4 06/15/2018    " TRIG 88 06/15/2018    CHOLHDL 32.3 06/15/2018         Imaging:  I have personally reviewed the imaging, MRI brain shows pontine capillary telangectasia.     Assessment and Plan:  Ms. Fernández is a 68 y.o. female here for followup of transient episode of bilateral arm weakness. I think this could have been due to bleed from the capillary telangectasia. Also spinal TIA is a possibility but I do not want her on aspirin given the bleed risk. No further episodes.     She as some intermittent sharp pains in her left posterior thigh and ankle. This could be nerve related (sciatic vs. Lumbosacral radic). I discussed EMG but we will hold off due to the intermittent nature. I advised her on some stretches she can do and advised her to call if this worsened or fails to improve.     Followup in 12 months or sooner if needed.     Episode of transient neurologic symptoms    Abnormal brain MRI    Left leg pain

## 2019-06-21 NOTE — PATIENT INSTRUCTIONS
Sciatica    Sciatica is a condition that causes pain in the lower back that spreads down into the buttock, hip, and leg. Sometimes the leg pain can happen without any back pain. Sciatica happens when a spinal nerve is irritated or has pressure put on it as comes out of the spinal canal in the lower back. This most often happens when a bulge or rupture of a nearby spinal disk presses on the nerve. Sciatica can also be caused by a narrowing of the spinal canal (spinal stenosis) or spasm of the muscle in the buttocks that the sciatic nerve passes through (pyriform muscle). Sciatica is also called lumbar radiculopathy.  Sciatica may begin after a sudden twisting or bending force, such as in a car accident. Or it can happen after a simple awkward movement. In either case, muscle spasm often also happens. Muscle spasm makes the pain worse.  A healthcare provider makes a diagnosis of sciatica from your symptoms and a physical exam. Unless you had an injury from a car accident or fall, you usually wont have X-rays taken at this time. This is because the nerves and disks in your back cant be seen on an X-ray. If the provider sees signs of a compressed nerve, you will need to schedule an MRI scan as an outpatient. Signs of a compressed nerve include loss of strength in a leg.  Most sciatica gets better with medicine, exercise, and physical therapy. If your symptoms continue after at least 3 months of medical treatment, you may need surgery or injections to your lower back.  Home care  Follow these tips when caring for yourself at home:  · You may need to stay in bed the first few days. But as soon as possible, begin sitting up or walking. This will help you avoid problems that come from staying in bed for long periods.  · When in bed, try to find a position that is comfortable. A firm mattress is best. Try lying flat on your back with pillows under your knees. You can also try lying on your side with your knees bent up  toward your chest and a pillow between your knees.  · Avoid sitting for long periods. This puts more stress on your lower back than standing or walking.  · Use heat from a hot shower, hot bath, or heating pad to help ease pain. Massage can also help. You can also try using an ice pack. You can make your own ice pack by putting ice cubes in a plastic bag. Wrap the bag in a thin towel. Try both heat and cold to see which works best. Use the method that feels best for 20 minutes several times a day.  · You may use acetaminophen or ibuprofen to ease pain, unless another pain medicine was prescribed. Note: If you have chronic liver or kidney disease, talk with your healthcare provider before taking these medicines. Also talk with your provider if youve had a stomach ulcer or gastrointestinal bleeding.  · Use safe lifting methods. Dont lift anything heavier than 15 pounds until all of the pain is gone.  Follow-up care  Follow up with your healthcare provider, or as advised. You may need physical therapy or additional tests.  If X-rays were taken, a radiologist will look at them. You will be told of any new findings that may affect your care.  When to seek medical advice  Call your healthcare provider right away if any of these occur:  · Pain gets worse even after taking prescribed medicine  · Weakness or numbness in 1 or both legs or hips  · Numbness in your groin or genital area  · You cant control your bowel or bladder  · Fever  · Redness or swelling over your back or spine   Date Last Reviewed: 8/1/2016  © 4504-4609 [employer]. [address] [place] [address], [place] [address]. All rights reserved. This information is not intended as a substitute for professional medical care. Always follow your healthcare professional's instructions.        Exercises to Strengthen Your Lower Back  Strong lower back and abdominal muscles work together to support your spine. The exercises below will help strengthen the lower  back. It is important that you begin exercising slowly and increase levels gradually.  Always begin any exercise program with stretching. If you feel pain while doing any of these exercises, stop and talk to your doctor about a more specific exercise program that better suits your condition.   Low back stretch  The point of stretching is to make you more flexible and increase your range of motion. Stretch only as much as you are able. Stretch slowly. Do not push your stretch to the limit. If at any point you feel pain while stretching, this is your (temporary) limit.  · Lie on your back with your knees bent and both feet on the ground.  · Slowly raise your left knee to your chest as you flatten your lower back against the floor. Hold for 5 seconds.  · Relax and repeat the exercise with your right knee.  · Do 10 of these exercises for each leg.  · Repeat hugging both knees to your chest at the same time.  Building lower back strength  Start your exercise routine with 10 to 30 minutes a day, 1 to 3 times a day.  Initial exercises  Lying on your back:  1. Ankle pumps: Move your foot up and down, towards your head, and then away. Repeat 10 times with each foot.  2. Heel slides: Slowly bend your knee, drawing the heel of your foot towards you. Then slide your heel/foot from you, straightening your knee. Do not lift your foot off the floor (this is not a leg lift).  3. Abdominal contraction: Bend your knees and put your hands on your stomach. Tighten your stomach muscles. Hold for 5 seconds, then relax. Repeat 10 times.  4. Straight leg raise: Bend one leg at the knee and keep the other leg straight. Tighten your stomach muscles. Slowly lift your straight leg 6 to 12 inches off the floor and hold for up to 5 seconds. Repeat 10 times on each side.  Standin. Wall squats: Stand with your back against the wall. Move your feet about 12 inches away from the wall. Tighten your stomach muscles, and slowly bend your knees  until they are at about a 45 degree angle. Do not go down too far. Hold about 5 seconds. Then slowly return to your starting position. Repeat 10 times.  2. Heel raises: Stand facing the wall. Slowly raise the heels of your feet up and down, while keeping your toes on the floor. If you have trouble balancing, you can touch the wall with your hands. Repeat 10 times.  More advanced exercises  When you feel comfortable enough, try these exercises.  1. Kneeling lumbar extension: Begin on your hands and knees. At the same time, raise and straighten your right arm and left leg until they are parallel to the ground. Hold for 2 seconds and come back slowly to a starting position. Repeat with left arm and right leg, alternating 10 times.  2. Prone lumbar extension: Lie face down, arms extended overhead, palms on the floor. At the same time, raise your right arm and left leg as high as comfortably possible. Hold for 10 seconds and slowly return to start. Repeat with left arm and right leg, alternating 10 times. Gradually build up to 20 times. (Advanced: Repeat this exercise raising both arms and both legs a few inches off the floor at the same time. Hold for 5 seconds and release.)  3. Pelvic tilt: Lie on the floor on your back with your knees bent at 90 degrees. Your feet should be flat on the floor. Inhale, exhale, then slowly contract your abdominal muscles bringing your navel toward your spine. Let your pelvis rock back until your lower back is flat on the floor. Hold for 10 seconds while breathing smoothly.  4. Abdominal crunch: Perform a pelvic tilt (above) flattening your lower back against the floor. Holding the tension in your abdominal muscles, take another breath and raise your shoulder blades off the ground (this is not a full sit-up). Keep your head in line with your body (dont bend your neck forward). Hold for 2 seconds, then slowly lower.  Date Last Reviewed: 6/1/2016  © 3048-7022 [employer]. [address]  [place] [address]. All rights reserved. This information is not intended as a substitute for professional medical care. Always follow your healthcare professional's instructions.      Back Exercise to Keep Fit for Low Back Pain  To help in your recovery and to prevent further back problems, keep your back, abdominal muscles and legs strong. Walk daily as soon as you can. Gradually add other physical activities such as swimming and biking, which can help improve lower back strength. Begin as soon as you can do them comfortably. Do not do any exercises that make your pain a lot worse. The following are some back exercises that can help relieve low back pain.     Pelvic tilt   Repeat 5-10 times, twice per day.  Lie flat on your back (or stand with your back to a wall), knees bent, feet flat on the floor, body relaxed. Tighten your abdominal and buttock muscles, and tilt your pelvis. The curve of the small of your back should flatten towards the floor (or wall). Hold 10 seconds and then relax.       Knee raise     Repeat 5-10 times, twice per day.    Lie flat on your back, knees bent. Bring one knee slowly to your chest. Hug your knee gently. Then lower your leg toward the floor, keeping your knee bent. Do not straighten your legs. Repeat exercise with your other leg.              Partial press-up     Lie face down on a soft, firm surface. Do not turn your head to either side. Rest your arms bent at the elbows alongside your body. Relax for a few minutes. Then raise your upper body enough to lean on your elbows. Relax your lower back and legs as much as possible. Hold this position for 30 seconds at first. Gradually work up to five minutes. Or try slow press-ups. Hold each for five seconds, and repeat five to six times.              Copyright © 2012 by [employer]   Alla DIXON, Shay GRACE, Girma J, Leobardo B, Wang R, Luisana B, Elias K, Troy C, Tamar B, Ovi S,  Moshe Hernandez. [employer]. Adult Acute and Subacute Low Back Pain. Updated November 2012.

## 2019-08-30 PROBLEM — M85.89 OSTEOPENIA OF MULTIPLE SITES: Status: ACTIVE | Noted: 2019-08-30

## 2020-02-13 ENCOUNTER — TELEPHONE (OUTPATIENT)
Dept: NEUROLOGY | Facility: CLINIC | Age: 70
End: 2020-02-13

## 2020-02-13 NOTE — TELEPHONE ENCOUNTER
----- Message from Mahesh Lind Jr., MD sent at 2/13/2020  2:02 PM CST -----  Overdue result.  Please look into it.    ----- Message -----  From: SYSTEM  Sent: 2/12/2020  12:24 AM CST  To: Tamika Nagy MD

## 2020-02-21 ENCOUNTER — LAB VISIT (OUTPATIENT)
Dept: LAB | Facility: HOSPITAL | Age: 70
End: 2020-02-21
Attending: PSYCHIATRY & NEUROLOGY
Payer: MEDICARE

## 2020-02-21 ENCOUNTER — TELEPHONE (OUTPATIENT)
Dept: NEUROLOGY | Facility: CLINIC | Age: 70
End: 2020-02-21

## 2020-02-21 DIAGNOSIS — Q27.9 VENOUS MALFORMATION: ICD-10-CM

## 2020-02-21 LAB
CREAT SERPL-MCNC: 0.9 MG/DL (ref 0.5–1.4)
EST. GFR  (AFRICAN AMERICAN): >60 ML/MIN/1.73 M^2
EST. GFR  (NON AFRICAN AMERICAN): >60 ML/MIN/1.73 M^2

## 2020-02-21 PROCEDURE — 36415 COLL VENOUS BLD VENIPUNCTURE: CPT | Mod: PO

## 2020-02-21 PROCEDURE — 82565 ASSAY OF CREATININE: CPT | Mod: PO

## 2020-02-21 NOTE — TELEPHONE ENCOUNTER
Spoke with pt. Scheduled ASAP lab work. Appt date/time/location confirmed. Verbalized understanding.

## 2020-02-21 NOTE — TELEPHONE ENCOUNTER
----- Message from Cruz Mendoza sent at 2/20/2020 10:42 AM CST -----  Contact: same  Patient called in and stated she got a call last week some time about having to get lab work done for Dr. Nagy?  Patient stated she was not sure what it was for but the order in system patient stated was not what Dr. Nagy was looking for??    Patient call back number is [phone]

## 2020-03-13 PROBLEM — G93.9 PONTINE LESION: Status: ACTIVE | Noted: 2020-03-13

## 2020-03-13 PROBLEM — G47.62 LEG CRAMPS, SLEEP RELATED: Status: RESOLVED | Noted: 2017-06-15 | Resolved: 2020-03-13

## 2020-05-05 ENCOUNTER — TELEPHONE (OUTPATIENT)
Dept: NEUROLOGY | Facility: CLINIC | Age: 70
End: 2020-05-05

## 2020-05-05 NOTE — TELEPHONE ENCOUNTER
----- Message from Truman Perez sent at 5/5/2020  9:00 AM CDT -----  Contact: self   Patient want to speak with a nurse regarding scheduling follow up appointment for spot on brain please call back at [phone]    Case number [id number]

## 2020-05-11 ENCOUNTER — TELEPHONE (OUTPATIENT)
Dept: OBSTETRICS AND GYNECOLOGY | Facility: CLINIC | Age: 70
End: 2020-05-11

## 2020-05-11 DIAGNOSIS — Z12.31 SCREENING MAMMOGRAM, ENCOUNTER FOR: Primary | ICD-10-CM

## 2020-05-11 NOTE — TELEPHONE ENCOUNTER
----- Message from Emi Villar sent at 5/11/2020  2:36 PM CDT -----  Contact: Patient  Type: Needs Medical Advice  Who Called:  Hernán Cedillo Call Back Number:   Additional Information: Calling to speak with the nurse to request orders for a mammogram.

## 2020-05-30 ENCOUNTER — NURSE TRIAGE (OUTPATIENT)
Dept: ADMINISTRATIVE | Facility: CLINIC | Age: 70
End: 2020-05-30

## 2020-05-30 ENCOUNTER — OFFICE VISIT (OUTPATIENT)
Dept: URGENT CARE | Facility: CLINIC | Age: 70
End: 2020-05-30
Payer: MEDICARE

## 2020-05-30 VITALS
HEART RATE: 95 BPM | RESPIRATION RATE: 17 BRPM | TEMPERATURE: 98 F | HEIGHT: 64 IN | WEIGHT: 115 LBS | OXYGEN SATURATION: 98 % | BODY MASS INDEX: 19.63 KG/M2

## 2020-05-30 DIAGNOSIS — Z20.822 EXPOSURE TO COVID-19 VIRUS: Primary | ICD-10-CM

## 2020-05-30 PROCEDURE — U0003 INFECTIOUS AGENT DETECTION BY NUCLEIC ACID (DNA OR RNA); SEVERE ACUTE RESPIRATORY SYNDROME CORONAVIRUS 2 (SARS-COV-2) (CORONAVIRUS DISEASE [COVID-19]), AMPLIFIED PROBE TECHNIQUE, MAKING USE OF HIGH THROUGHPUT TECHNOLOGIES AS DESCRIBED BY CMS-2020-01-R: HCPCS

## 2020-05-30 PROCEDURE — 99211 OFF/OP EST MAY X REQ PHY/QHP: CPT | Mod: S$GLB,,, | Performed by: FAMILY MEDICINE

## 2020-05-30 PROCEDURE — 99211 PR OFFICE/OUTPT VISIT, EST, LEVL I: ICD-10-PCS | Mod: S$GLB,,, | Performed by: FAMILY MEDICINE

## 2020-05-30 NOTE — TELEPHONE ENCOUNTER
Reason for Disposition   COVID -19, questions about    Additional Information   Negative: SEVERE difficulty breathing (e.g., struggling for each breath, speaks in single words)   Negative: Bluish (or gray) lips or face now   Negative: Sounds like a life-threatening emergency to the triager   Negative: [1] Difficulty breathing occurs AND [2] within 14 days of COVID-19 EXPOSURE (Close Contact)   Negative: Patient sounds very sick or weak to the triager   Negative: [1] Fever (or feeling feverish) OR cough AND [2] within 14 Days of COVID-19 EXPOSURE (Close Contact)   Negative: [1] Fever (or feeling feverish) OR cough occurs AND [2] within 14 days of travel from another country (international travel)   Negative: [1] Fever (or feeling feverish) OR cough occurs AND [2] within 14 days of travel from a city or area with major community spread   Negative: [1] Fever (or feeling feverish) OR cough occurs AND [2] living in area with major community spread AND [3] testing being done in the community for symptoms   Negative: [1] Mild body aches, chills, diarrhea, headache, runny nose, or sore throat AND [2] within 14 days of COVID-19 EXPOSURE   Negative: [1] COVID-19 EXPOSURE within last 14 days AND [2] NO cough, fever, or breathing difficulty AND [3] exposed person is a healthcare worker who was NOT using all recommended personal protective equipment (i.e., a respirator-N95 mask, eye protection, gloves, and gown)    Protocols used: CORONAVIRUS (COVID-19) EXPOSURE-A-AH    A golfing angie took the test yesterday and thinks they may have hit. Patient exposed to her golf partner about 10 days ago, but Mrs. Fernández is not having any symptoms. She was given the address and phone number to the nearest testing center.

## 2020-05-31 LAB — SARS-COV-2 RNA RESP QL NAA+PROBE: NOT DETECTED

## 2020-06-01 ENCOUNTER — TELEPHONE (OUTPATIENT)
Dept: URGENT CARE | Facility: CLINIC | Age: 70
End: 2020-06-01

## 2021-01-10 ENCOUNTER — IMMUNIZATION (OUTPATIENT)
Dept: FAMILY MEDICINE | Facility: CLINIC | Age: 71
End: 2021-01-10
Payer: MEDICARE

## 2021-01-10 DIAGNOSIS — Z23 NEED FOR VACCINATION: ICD-10-CM

## 2021-01-10 PROCEDURE — 91300 COVID-19, MRNA, LNP-S, PF, 30 MCG/0.3 ML DOSE VACCINE: CPT | Mod: PBBFAC | Performed by: FAMILY MEDICINE

## 2021-01-31 ENCOUNTER — IMMUNIZATION (OUTPATIENT)
Dept: FAMILY MEDICINE | Facility: CLINIC | Age: 71
End: 2021-01-31
Payer: MEDICARE

## 2021-01-31 DIAGNOSIS — Z23 NEED FOR VACCINATION: Primary | ICD-10-CM

## 2021-01-31 PROCEDURE — 91300 COVID-19, MRNA, LNP-S, PF, 30 MCG/0.3 ML DOSE VACCINE: CPT | Mod: PBBFAC | Performed by: INTERNAL MEDICINE

## 2021-01-31 PROCEDURE — 0002A COVID-19, MRNA, LNP-S, PF, 30 MCG/0.3 ML DOSE VACCINE: CPT | Mod: PBBFAC | Performed by: INTERNAL MEDICINE

## 2021-02-08 ENCOUNTER — TELEPHONE (OUTPATIENT)
Dept: PRIMARY CARE CLINIC | Facility: CLINIC | Age: 71
End: 2021-02-08

## 2021-02-12 ENCOUNTER — TELEPHONE (OUTPATIENT)
Dept: FAMILY MEDICINE | Facility: CLINIC | Age: 71
End: 2021-02-12

## 2021-03-29 ENCOUNTER — OFFICE VISIT (OUTPATIENT)
Dept: OTOLARYNGOLOGY | Facility: CLINIC | Age: 71
End: 2021-03-29
Payer: MEDICARE

## 2021-03-29 VITALS — HEIGHT: 64 IN | BODY MASS INDEX: 20.14 KG/M2 | WEIGHT: 117.94 LBS

## 2021-03-29 DIAGNOSIS — H61.21 IMPACTED CERUMEN OF RIGHT EAR: ICD-10-CM

## 2021-03-29 DIAGNOSIS — J31.0 RHINITIS, UNSPECIFIED TYPE: Primary | ICD-10-CM

## 2021-03-29 PROCEDURE — 1126F AMNT PAIN NOTED NONE PRSNT: CPT | Mod: S$GLB,,, | Performed by: OTOLARYNGOLOGY

## 2021-03-29 PROCEDURE — 1126F PR PAIN SEVERITY QUANTIFIED, NO PAIN PRESENT: ICD-10-PCS | Mod: S$GLB,,, | Performed by: OTOLARYNGOLOGY

## 2021-03-29 PROCEDURE — 99203 PR OFFICE/OUTPT VISIT, NEW, LEVL III, 30-44 MIN: ICD-10-PCS | Mod: 25,S$GLB,, | Performed by: OTOLARYNGOLOGY

## 2021-03-29 PROCEDURE — 69210 PR REMOVAL IMPACTED CERUMEN REQUIRING INSTRUMENTATION, UNILATERAL: ICD-10-PCS | Mod: S$GLB,,, | Performed by: OTOLARYNGOLOGY

## 2021-03-29 PROCEDURE — 3008F PR BODY MASS INDEX (BMI) DOCUMENTED: ICD-10-PCS | Mod: S$GLB,,, | Performed by: OTOLARYNGOLOGY

## 2021-03-29 PROCEDURE — 3288F FALL RISK ASSESSMENT DOCD: CPT | Mod: S$GLB,,, | Performed by: OTOLARYNGOLOGY

## 2021-03-29 PROCEDURE — 99203 OFFICE O/P NEW LOW 30 MIN: CPT | Mod: 25,S$GLB,, | Performed by: OTOLARYNGOLOGY

## 2021-03-29 PROCEDURE — 3008F BODY MASS INDEX DOCD: CPT | Mod: S$GLB,,, | Performed by: OTOLARYNGOLOGY

## 2021-03-29 PROCEDURE — 69210 REMOVE IMPACTED EAR WAX UNI: CPT | Mod: S$GLB,,, | Performed by: OTOLARYNGOLOGY

## 2021-03-29 PROCEDURE — 1159F MED LIST DOCD IN RCRD: CPT | Mod: S$GLB,,, | Performed by: OTOLARYNGOLOGY

## 2021-03-29 PROCEDURE — 99999 PR PBB SHADOW E&M-EST. PATIENT-LVL IV: ICD-10-PCS | Mod: PBBFAC,,, | Performed by: OTOLARYNGOLOGY

## 2021-03-29 PROCEDURE — 3288F PR FALLS RISK ASSESSMENT DOCUMENTED: ICD-10-PCS | Mod: S$GLB,,, | Performed by: OTOLARYNGOLOGY

## 2021-03-29 PROCEDURE — 1101F PR PT FALLS ASSESS DOC 0-1 FALLS W/OUT INJ PAST YR: ICD-10-PCS | Mod: S$GLB,,, | Performed by: OTOLARYNGOLOGY

## 2021-03-29 PROCEDURE — 99999 PR PBB SHADOW E&M-EST. PATIENT-LVL IV: CPT | Mod: PBBFAC,,, | Performed by: OTOLARYNGOLOGY

## 2021-03-29 PROCEDURE — 1159F PR MEDICATION LIST DOCUMENTED IN MEDICAL RECORD: ICD-10-PCS | Mod: S$GLB,,, | Performed by: OTOLARYNGOLOGY

## 2021-03-29 PROCEDURE — 1101F PT FALLS ASSESS-DOCD LE1/YR: CPT | Mod: S$GLB,,, | Performed by: OTOLARYNGOLOGY

## 2021-05-11 ENCOUNTER — TELEPHONE (OUTPATIENT)
Dept: NEUROLOGY | Facility: CLINIC | Age: 71
End: 2021-05-11

## 2021-06-24 ENCOUNTER — TELEPHONE (OUTPATIENT)
Dept: NEUROLOGY | Facility: CLINIC | Age: 71
End: 2021-06-24

## 2021-07-16 ENCOUNTER — CLINICAL SUPPORT (OUTPATIENT)
Dept: AUDIOLOGY | Facility: CLINIC | Age: 71
End: 2021-07-16
Payer: MEDICARE

## 2021-07-16 ENCOUNTER — OFFICE VISIT (OUTPATIENT)
Dept: OTOLARYNGOLOGY | Facility: CLINIC | Age: 71
End: 2021-07-16
Payer: MEDICARE

## 2021-07-16 DIAGNOSIS — R42 DIZZINESS ON STANDING: ICD-10-CM

## 2021-07-16 DIAGNOSIS — H90.3 BILATERAL SENSORINEURAL HEARING LOSS: ICD-10-CM

## 2021-07-16 DIAGNOSIS — J01.01 ACUTE RECURRENT MAXILLARY SINUSITIS: ICD-10-CM

## 2021-07-16 DIAGNOSIS — H81.10 BENIGN PAROXYSMAL POSITIONAL VERTIGO, UNSPECIFIED LATERALITY: ICD-10-CM

## 2021-07-16 DIAGNOSIS — H81.11 BENIGN PAROXYSMAL POSITIONAL VERTIGO, RIGHT: Primary | ICD-10-CM

## 2021-07-16 DIAGNOSIS — H90.3 SENSORINEURAL HEARING LOSS (SNHL) OF BOTH EARS: Primary | ICD-10-CM

## 2021-07-16 PROCEDURE — 95992 CANALITH REPOSITIONING PROC: CPT | Mod: S$GLB,,, | Performed by: NURSE PRACTITIONER

## 2021-07-16 PROCEDURE — 92557 PR COMPREHENSIVE HEARING TEST: ICD-10-PCS | Mod: S$GLB,,, | Performed by: AUDIOLOGIST

## 2021-07-16 PROCEDURE — 99999 PR PBB SHADOW E&M-EST. PATIENT-LVL II: ICD-10-PCS | Mod: PBBFAC,,,

## 2021-07-16 PROCEDURE — 99999 PR PBB SHADOW E&M-EST. PATIENT-LVL II: CPT | Mod: PBBFAC,,,

## 2021-07-16 PROCEDURE — 92557 COMPREHENSIVE HEARING TEST: CPT | Mod: S$GLB,,, | Performed by: AUDIOLOGIST

## 2021-07-16 PROCEDURE — 92567 PR TYMPA2METRY: ICD-10-PCS | Mod: S$GLB,,, | Performed by: AUDIOLOGIST

## 2021-07-16 PROCEDURE — 92567 TYMPANOMETRY: CPT | Mod: S$GLB,,, | Performed by: AUDIOLOGIST

## 2021-07-16 PROCEDURE — 1159F MED LIST DOCD IN RCRD: CPT | Mod: S$GLB,,, | Performed by: NURSE PRACTITIONER

## 2021-07-16 PROCEDURE — 99214 OFFICE O/P EST MOD 30 MIN: CPT | Mod: 25,S$GLB,, | Performed by: NURSE PRACTITIONER

## 2021-07-16 PROCEDURE — 99214 PR OFFICE/OUTPT VISIT, EST, LEVL IV, 30-39 MIN: ICD-10-PCS | Mod: 25,S$GLB,, | Performed by: NURSE PRACTITIONER

## 2021-07-16 PROCEDURE — 95992 PR CANALITH REPOSITIONING PROCEDURE, PER DAY: ICD-10-PCS | Mod: S$GLB,,, | Performed by: NURSE PRACTITIONER

## 2021-07-16 PROCEDURE — 99999 PR PBB SHADOW E&M-EST. PATIENT-LVL III: CPT | Mod: PBBFAC,,, | Performed by: NURSE PRACTITIONER

## 2021-07-16 PROCEDURE — 99999 PR PBB SHADOW E&M-EST. PATIENT-LVL III: ICD-10-PCS | Mod: PBBFAC,,, | Performed by: NURSE PRACTITIONER

## 2021-07-16 PROCEDURE — 1159F PR MEDICATION LIST DOCUMENTED IN MEDICAL RECORD: ICD-10-PCS | Mod: S$GLB,,, | Performed by: NURSE PRACTITIONER

## 2021-07-30 ENCOUNTER — CLINICAL SUPPORT (OUTPATIENT)
Dept: URGENT CARE | Facility: CLINIC | Age: 71
End: 2021-07-30
Payer: MEDICARE

## 2021-07-30 DIAGNOSIS — Z20.822 COVID-19 RULED OUT: Primary | ICD-10-CM

## 2021-07-30 LAB
CTP QC/QA: YES
SARS-COV-2 RDRP RESP QL NAA+PROBE: NEGATIVE

## 2021-07-30 PROCEDURE — U0002: ICD-10-PCS | Mod: QW,S$GLB,, | Performed by: FAMILY MEDICINE

## 2021-07-30 PROCEDURE — U0002 COVID-19 LAB TEST NON-CDC: HCPCS | Mod: QW,S$GLB,, | Performed by: FAMILY MEDICINE

## 2021-07-30 PROCEDURE — 99211 OFF/OP EST MAY X REQ PHY/QHP: CPT | Mod: S$GLB,,, | Performed by: FAMILY MEDICINE

## 2021-07-30 PROCEDURE — 99211 PR OFFICE/OUTPT VISIT, EST, LEVL I: ICD-10-PCS | Mod: S$GLB,,, | Performed by: FAMILY MEDICINE

## 2021-08-23 ENCOUNTER — OFFICE VISIT (OUTPATIENT)
Dept: NEUROLOGY | Facility: CLINIC | Age: 71
End: 2021-08-23
Payer: MEDICARE

## 2021-08-23 VITALS
DIASTOLIC BLOOD PRESSURE: 72 MMHG | RESPIRATION RATE: 16 BRPM | HEART RATE: 82 BPM | SYSTOLIC BLOOD PRESSURE: 141 MMHG | BODY MASS INDEX: 19.1 KG/M2 | TEMPERATURE: 97 F | HEIGHT: 64 IN | WEIGHT: 111.88 LBS

## 2021-08-23 DIAGNOSIS — G93.9 PONTINE LESION: ICD-10-CM

## 2021-08-23 DIAGNOSIS — R20.2 LEFT HAND PARESTHESIA: ICD-10-CM

## 2021-08-23 DIAGNOSIS — R29.90 EPISODE OF TRANSIENT NEUROLOGIC SYMPTOMS: Primary | ICD-10-CM

## 2021-08-23 PROCEDURE — 1159F MED LIST DOCD IN RCRD: CPT | Mod: S$GLB,,, | Performed by: PSYCHIATRY & NEUROLOGY

## 2021-08-23 PROCEDURE — 99214 OFFICE O/P EST MOD 30 MIN: CPT | Mod: S$GLB,,, | Performed by: PSYCHIATRY & NEUROLOGY

## 2021-08-23 PROCEDURE — 1160F RVW MEDS BY RX/DR IN RCRD: CPT | Mod: S$GLB,,, | Performed by: PSYCHIATRY & NEUROLOGY

## 2021-08-23 PROCEDURE — 3078F DIAST BP <80 MM HG: CPT | Mod: S$GLB,,, | Performed by: PSYCHIATRY & NEUROLOGY

## 2021-08-23 PROCEDURE — 99999 PR PBB SHADOW E&M-EST. PATIENT-LVL IV: CPT | Mod: PBBFAC,,, | Performed by: PSYCHIATRY & NEUROLOGY

## 2021-08-23 PROCEDURE — 3008F PR BODY MASS INDEX (BMI) DOCUMENTED: ICD-10-PCS | Mod: S$GLB,,, | Performed by: PSYCHIATRY & NEUROLOGY

## 2021-08-23 PROCEDURE — 1159F PR MEDICATION LIST DOCUMENTED IN MEDICAL RECORD: ICD-10-PCS | Mod: S$GLB,,, | Performed by: PSYCHIATRY & NEUROLOGY

## 2021-08-23 PROCEDURE — 1160F PR REVIEW ALL MEDS BY PRESCRIBER/CLIN PHARMACIST DOCUMENTED: ICD-10-PCS | Mod: S$GLB,,, | Performed by: PSYCHIATRY & NEUROLOGY

## 2021-08-23 PROCEDURE — 3078F PR MOST RECENT DIASTOLIC BLOOD PRESSURE < 80 MM HG: ICD-10-PCS | Mod: S$GLB,,, | Performed by: PSYCHIATRY & NEUROLOGY

## 2021-08-23 PROCEDURE — 3077F SYST BP >= 140 MM HG: CPT | Mod: S$GLB,,, | Performed by: PSYCHIATRY & NEUROLOGY

## 2021-08-23 PROCEDURE — 3077F PR MOST RECENT SYSTOLIC BLOOD PRESSURE >= 140 MM HG: ICD-10-PCS | Mod: S$GLB,,, | Performed by: PSYCHIATRY & NEUROLOGY

## 2021-08-23 PROCEDURE — 1126F AMNT PAIN NOTED NONE PRSNT: CPT | Mod: S$GLB,,, | Performed by: PSYCHIATRY & NEUROLOGY

## 2021-08-23 PROCEDURE — 99999 PR PBB SHADOW E&M-EST. PATIENT-LVL IV: ICD-10-PCS | Mod: PBBFAC,,, | Performed by: PSYCHIATRY & NEUROLOGY

## 2021-08-23 PROCEDURE — 3044F PR MOST RECENT HEMOGLOBIN A1C LEVEL <7.0%: ICD-10-PCS | Mod: S$GLB,,, | Performed by: PSYCHIATRY & NEUROLOGY

## 2021-08-23 PROCEDURE — 3044F HG A1C LEVEL LT 7.0%: CPT | Mod: S$GLB,,, | Performed by: PSYCHIATRY & NEUROLOGY

## 2021-08-23 PROCEDURE — 3008F BODY MASS INDEX DOCD: CPT | Mod: S$GLB,,, | Performed by: PSYCHIATRY & NEUROLOGY

## 2021-08-23 PROCEDURE — 99214 PR OFFICE/OUTPT VISIT, EST, LEVL IV, 30-39 MIN: ICD-10-PCS | Mod: S$GLB,,, | Performed by: PSYCHIATRY & NEUROLOGY

## 2021-08-23 PROCEDURE — 1126F PR PAIN SEVERITY QUANTIFIED, NO PAIN PRESENT: ICD-10-PCS | Mod: S$GLB,,, | Performed by: PSYCHIATRY & NEUROLOGY

## 2021-08-26 ENCOUNTER — LAB VISIT (OUTPATIENT)
Dept: LAB | Facility: HOSPITAL | Age: 71
End: 2021-08-26
Attending: PSYCHIATRY & NEUROLOGY
Payer: MEDICARE

## 2021-08-26 DIAGNOSIS — R29.90 EPISODE OF TRANSIENT NEUROLOGIC SYMPTOMS: ICD-10-CM

## 2021-08-26 LAB
CREAT SERPL-MCNC: 0.8 MG/DL (ref 0.5–1.4)
EST. GFR  (AFRICAN AMERICAN): >60 ML/MIN/1.73 M^2
EST. GFR  (NON AFRICAN AMERICAN): >60 ML/MIN/1.73 M^2

## 2021-08-26 PROCEDURE — 82565 ASSAY OF CREATININE: CPT | Performed by: PSYCHIATRY & NEUROLOGY

## 2021-08-26 PROCEDURE — 36415 COLL VENOUS BLD VENIPUNCTURE: CPT | Mod: PO | Performed by: PSYCHIATRY & NEUROLOGY

## 2021-09-23 ENCOUNTER — TELEPHONE (OUTPATIENT)
Dept: NEUROLOGY | Facility: CLINIC | Age: 71
End: 2021-09-23

## 2021-09-23 ENCOUNTER — HOSPITAL ENCOUNTER (OUTPATIENT)
Dept: RADIOLOGY | Facility: HOSPITAL | Age: 71
Discharge: HOME | End: 2021-09-23
Attending: PSYCHIATRY & NEUROLOGY
Payer: MEDICARE

## 2021-09-23 DIAGNOSIS — R29.90 EPISODE OF TRANSIENT NEUROLOGIC SYMPTOMS: ICD-10-CM

## 2021-09-23 PROCEDURE — 70553 MRI BRAIN STEM W/O & W/DYE: CPT | Mod: 26,,, | Performed by: RADIOLOGY

## 2021-09-23 PROCEDURE — A9585 GADOBUTROL INJECTION: HCPCS | Mod: PO | Performed by: PSYCHIATRY & NEUROLOGY

## 2021-09-23 PROCEDURE — 70553 MRI BRAIN STEM W/O & W/DYE: CPT | Mod: TC,PO

## 2021-09-23 PROCEDURE — 70553 MRI BRAIN W WO CONTRAST: ICD-10-PCS | Mod: 26,,, | Performed by: RADIOLOGY

## 2021-09-23 PROCEDURE — 25500020 PHARM REV CODE 255: Mod: PO | Performed by: PSYCHIATRY & NEUROLOGY

## 2021-09-23 RX ORDER — GADOBUTROL 604.72 MG/ML
5 INJECTION INTRAVENOUS
Status: COMPLETED | OUTPATIENT
Start: 2021-09-23 | End: 2021-09-23

## 2021-09-23 RX ADMIN — GADOBUTROL 5 ML: 604.72 INJECTION INTRAVENOUS at 10:09

## 2021-10-01 ENCOUNTER — NURSE TRIAGE (OUTPATIENT)
Dept: ADMINISTRATIVE | Facility: CLINIC | Age: 71
End: 2021-10-01

## 2021-10-14 ENCOUNTER — IMMUNIZATION (OUTPATIENT)
Dept: FAMILY MEDICINE | Facility: CLINIC | Age: 71
End: 2021-10-14
Payer: MEDICARE

## 2021-10-14 DIAGNOSIS — Z23 NEED FOR VACCINATION: Primary | ICD-10-CM

## 2021-10-14 PROCEDURE — 0003A COVID-19, MRNA, LNP-S, PF, 30 MCG/0.3 ML DOSE VACCINE: CPT | Mod: PBBFAC | Performed by: FAMILY MEDICINE

## 2021-10-14 PROCEDURE — 91300 COVID-19, MRNA, LNP-S, PF, 30 MCG/0.3 ML DOSE VACCINE: CPT | Mod: PBBFAC | Performed by: FAMILY MEDICINE

## 2021-10-26 ENCOUNTER — TELEPHONE (OUTPATIENT)
Dept: NEUROLOGY | Facility: CLINIC | Age: 71
End: 2021-10-26
Payer: MEDICARE

## 2021-11-05 ENCOUNTER — OFFICE VISIT (OUTPATIENT)
Dept: SPINE | Facility: CLINIC | Age: 71
End: 2021-11-05
Payer: MEDICARE

## 2021-11-05 VITALS
WEIGHT: 111.56 LBS | HEART RATE: 76 BPM | DIASTOLIC BLOOD PRESSURE: 78 MMHG | HEIGHT: 64 IN | SYSTOLIC BLOOD PRESSURE: 129 MMHG | BODY MASS INDEX: 19.04 KG/M2

## 2021-11-05 DIAGNOSIS — M85.89 OSTEOPENIA OF MULTIPLE SITES: ICD-10-CM

## 2021-11-05 DIAGNOSIS — M54.50 LUMBAR SPINE PAIN: ICD-10-CM

## 2021-11-05 DIAGNOSIS — M79.18 MYOFASCIAL PAIN SYNDROME OF LUMBAR SPINE: ICD-10-CM

## 2021-11-05 PROCEDURE — 99203 PR OFFICE/OUTPT VISIT, NEW, LEVL III, 30-44 MIN: ICD-10-PCS | Mod: S$GLB,,, | Performed by: PHYSICIAN ASSISTANT

## 2021-11-05 PROCEDURE — 1159F PR MEDICATION LIST DOCUMENTED IN MEDICAL RECORD: ICD-10-PCS | Mod: S$GLB,,, | Performed by: PHYSICIAN ASSISTANT

## 2021-11-05 PROCEDURE — 3044F PR MOST RECENT HEMOGLOBIN A1C LEVEL <7.0%: ICD-10-PCS | Mod: S$GLB,,, | Performed by: PHYSICIAN ASSISTANT

## 2021-11-05 PROCEDURE — 1101F PR PT FALLS ASSESS DOC 0-1 FALLS W/OUT INJ PAST YR: ICD-10-PCS | Mod: S$GLB,,, | Performed by: PHYSICIAN ASSISTANT

## 2021-11-05 PROCEDURE — 1159F MED LIST DOCD IN RCRD: CPT | Mod: S$GLB,,, | Performed by: PHYSICIAN ASSISTANT

## 2021-11-05 PROCEDURE — 3078F DIAST BP <80 MM HG: CPT | Mod: S$GLB,,, | Performed by: PHYSICIAN ASSISTANT

## 2021-11-05 PROCEDURE — 99203 OFFICE O/P NEW LOW 30 MIN: CPT | Mod: S$GLB,,, | Performed by: PHYSICIAN ASSISTANT

## 2021-11-05 PROCEDURE — 1160F PR REVIEW ALL MEDS BY PRESCRIBER/CLIN PHARMACIST DOCUMENTED: ICD-10-PCS | Mod: S$GLB,,, | Performed by: PHYSICIAN ASSISTANT

## 2021-11-05 PROCEDURE — 3078F PR MOST RECENT DIASTOLIC BLOOD PRESSURE < 80 MM HG: ICD-10-PCS | Mod: S$GLB,,, | Performed by: PHYSICIAN ASSISTANT

## 2021-11-05 PROCEDURE — 1125F AMNT PAIN NOTED PAIN PRSNT: CPT | Mod: S$GLB,,, | Performed by: PHYSICIAN ASSISTANT

## 2021-11-05 PROCEDURE — 3288F PR FALLS RISK ASSESSMENT DOCUMENTED: ICD-10-PCS | Mod: S$GLB,,, | Performed by: PHYSICIAN ASSISTANT

## 2021-11-05 PROCEDURE — 1101F PT FALLS ASSESS-DOCD LE1/YR: CPT | Mod: S$GLB,,, | Performed by: PHYSICIAN ASSISTANT

## 2021-11-05 PROCEDURE — 1125F PR PAIN SEVERITY QUANTIFIED, PAIN PRESENT: ICD-10-PCS | Mod: S$GLB,,, | Performed by: PHYSICIAN ASSISTANT

## 2021-11-05 PROCEDURE — 3008F BODY MASS INDEX DOCD: CPT | Mod: S$GLB,,, | Performed by: PHYSICIAN ASSISTANT

## 2021-11-05 PROCEDURE — 3074F SYST BP LT 130 MM HG: CPT | Mod: S$GLB,,, | Performed by: PHYSICIAN ASSISTANT

## 2021-11-05 PROCEDURE — 99999 PR PBB SHADOW E&M-EST. PATIENT-LVL IV: CPT | Mod: PBBFAC,,, | Performed by: PHYSICIAN ASSISTANT

## 2021-11-05 PROCEDURE — 1160F RVW MEDS BY RX/DR IN RCRD: CPT | Mod: S$GLB,,, | Performed by: PHYSICIAN ASSISTANT

## 2021-11-05 PROCEDURE — 3008F PR BODY MASS INDEX (BMI) DOCUMENTED: ICD-10-PCS | Mod: S$GLB,,, | Performed by: PHYSICIAN ASSISTANT

## 2021-11-05 PROCEDURE — 3288F FALL RISK ASSESSMENT DOCD: CPT | Mod: S$GLB,,, | Performed by: PHYSICIAN ASSISTANT

## 2021-11-05 PROCEDURE — 3074F PR MOST RECENT SYSTOLIC BLOOD PRESSURE < 130 MM HG: ICD-10-PCS | Mod: S$GLB,,, | Performed by: PHYSICIAN ASSISTANT

## 2021-11-05 PROCEDURE — 99999 PR PBB SHADOW E&M-EST. PATIENT-LVL IV: ICD-10-PCS | Mod: PBBFAC,,, | Performed by: PHYSICIAN ASSISTANT

## 2021-11-05 PROCEDURE — 3044F HG A1C LEVEL LT 7.0%: CPT | Mod: S$GLB,,, | Performed by: PHYSICIAN ASSISTANT

## 2022-03-23 ENCOUNTER — TELEPHONE (OUTPATIENT)
Dept: RHEUMATOLOGY | Facility: CLINIC | Age: 72
End: 2022-03-23
Payer: MEDICARE

## 2022-03-23 NOTE — TELEPHONE ENCOUNTER
Pt advised that [facility] does not see new patients. She was given the phone number to the [place] location.

## 2022-03-23 NOTE — TELEPHONE ENCOUNTER
----- Message from Cherise Castellanos, Patient Care Assistant sent at 3/23/2022  4:12 PM CDT -----  Contact: Pt  Type: Needs Medical Advice    Who Called: Pt  Best Call Back Number: [phone]    Inquiry/Question: Pt is calling to check the status of getting scheduled as a new pt. Pt has referral from provider to be seen. Please call back and advise. Thank you~

## 2022-05-10 ENCOUNTER — TELEPHONE (OUTPATIENT)
Dept: OBSTETRICS AND GYNECOLOGY | Facility: CLINIC | Age: 72
End: 2022-05-10
Payer: MEDICARE

## 2022-05-10 NOTE — TELEPHONE ENCOUNTER
Pt called and stated that she has a bump/blister in her vaginal area that she noticed 2 days ago. Appointment scheduled.

## 2022-05-10 NOTE — TELEPHONE ENCOUNTER
----- Message from Vandana Daniels sent at 5/10/2022  3:31 PM CDT -----  Contact: pt  Type: Sooner Appt Request    Caller is requesting a sooner appointment.  Caller declined first available appointment listed below.    Name of Caller:  pt  When is the first available appointment?  6/29/22  Symptoms:  sore bump on vaginal area   Best Call Back Number  [phone]

## 2022-05-12 ENCOUNTER — OFFICE VISIT (OUTPATIENT)
Dept: OBSTETRICS AND GYNECOLOGY | Facility: CLINIC | Age: 72
End: 2022-05-12
Payer: MEDICARE

## 2022-05-12 VITALS
WEIGHT: 110.88 LBS | SYSTOLIC BLOOD PRESSURE: 124 MMHG | DIASTOLIC BLOOD PRESSURE: 70 MMHG | HEIGHT: 64 IN | BODY MASS INDEX: 18.93 KG/M2

## 2022-05-12 DIAGNOSIS — A60.9 HSV (HERPES SIMPLEX VIRUS) ANOGENITAL INFECTION: Primary | ICD-10-CM

## 2022-05-12 PROCEDURE — 3008F PR BODY MASS INDEX (BMI) DOCUMENTED: ICD-10-PCS | Mod: CPTII,S$GLB,, | Performed by: OBSTETRICS & GYNECOLOGY

## 2022-05-12 PROCEDURE — 99999 PR PBB SHADOW E&M-EST. PATIENT-LVL III: ICD-10-PCS | Mod: PBBFAC,,, | Performed by: OBSTETRICS & GYNECOLOGY

## 2022-05-12 PROCEDURE — 1101F PT FALLS ASSESS-DOCD LE1/YR: CPT | Mod: CPTII,S$GLB,, | Performed by: OBSTETRICS & GYNECOLOGY

## 2022-05-12 PROCEDURE — 87529 HSV DNA AMP PROBE: CPT | Performed by: OBSTETRICS & GYNECOLOGY

## 2022-05-12 PROCEDURE — 3288F PR FALLS RISK ASSESSMENT DOCUMENTED: ICD-10-PCS | Mod: CPTII,S$GLB,, | Performed by: OBSTETRICS & GYNECOLOGY

## 2022-05-12 PROCEDURE — 3078F DIAST BP <80 MM HG: CPT | Mod: CPTII,S$GLB,, | Performed by: OBSTETRICS & GYNECOLOGY

## 2022-05-12 PROCEDURE — 99213 OFFICE O/P EST LOW 20 MIN: CPT | Mod: S$GLB,,, | Performed by: OBSTETRICS & GYNECOLOGY

## 2022-05-12 PROCEDURE — 3288F FALL RISK ASSESSMENT DOCD: CPT | Mod: CPTII,S$GLB,, | Performed by: OBSTETRICS & GYNECOLOGY

## 2022-05-12 PROCEDURE — 1126F AMNT PAIN NOTED NONE PRSNT: CPT | Mod: CPTII,S$GLB,, | Performed by: OBSTETRICS & GYNECOLOGY

## 2022-05-12 PROCEDURE — 3074F PR MOST RECENT SYSTOLIC BLOOD PRESSURE < 130 MM HG: ICD-10-PCS | Mod: CPTII,S$GLB,, | Performed by: OBSTETRICS & GYNECOLOGY

## 2022-05-12 PROCEDURE — 99213 PR OFFICE/OUTPT VISIT, EST, LEVL III, 20-29 MIN: ICD-10-PCS | Mod: S$GLB,,, | Performed by: OBSTETRICS & GYNECOLOGY

## 2022-05-12 PROCEDURE — 1159F PR MEDICATION LIST DOCUMENTED IN MEDICAL RECORD: ICD-10-PCS | Mod: CPTII,S$GLB,, | Performed by: OBSTETRICS & GYNECOLOGY

## 2022-05-12 PROCEDURE — 1101F PR PT FALLS ASSESS DOC 0-1 FALLS W/OUT INJ PAST YR: ICD-10-PCS | Mod: CPTII,S$GLB,, | Performed by: OBSTETRICS & GYNECOLOGY

## 2022-05-12 PROCEDURE — 1126F PR PAIN SEVERITY QUANTIFIED, NO PAIN PRESENT: ICD-10-PCS | Mod: CPTII,S$GLB,, | Performed by: OBSTETRICS & GYNECOLOGY

## 2022-05-12 PROCEDURE — 3008F BODY MASS INDEX DOCD: CPT | Mod: CPTII,S$GLB,, | Performed by: OBSTETRICS & GYNECOLOGY

## 2022-05-12 PROCEDURE — 99999 PR PBB SHADOW E&M-EST. PATIENT-LVL III: CPT | Mod: PBBFAC,,, | Performed by: OBSTETRICS & GYNECOLOGY

## 2022-05-12 PROCEDURE — 3078F PR MOST RECENT DIASTOLIC BLOOD PRESSURE < 80 MM HG: ICD-10-PCS | Mod: CPTII,S$GLB,, | Performed by: OBSTETRICS & GYNECOLOGY

## 2022-05-12 PROCEDURE — 1159F MED LIST DOCD IN RCRD: CPT | Mod: CPTII,S$GLB,, | Performed by: OBSTETRICS & GYNECOLOGY

## 2022-05-12 PROCEDURE — 3074F SYST BP LT 130 MM HG: CPT | Mod: CPTII,S$GLB,, | Performed by: OBSTETRICS & GYNECOLOGY

## 2022-05-12 RX ORDER — VALACYCLOVIR HYDROCHLORIDE 1 G/1
1000 TABLET, FILM COATED ORAL 2 TIMES DAILY
Qty: 14 TABLET | Refills: 0 | Status: SHIPPED | OUTPATIENT
Start: 2022-05-12 | End: 2022-09-23

## 2022-05-12 RX ORDER — VALACYCLOVIR HYDROCHLORIDE 500 MG/1
1000 TABLET, FILM COATED ORAL 2 TIMES DAILY
Qty: 14 TABLET | Refills: 0 | Status: SHIPPED | OUTPATIENT
Start: 2022-05-12 | End: 2022-05-12 | Stop reason: SDUPTHER

## 2022-05-12 NOTE — PROGRESS NOTES
"Chief Complaint   Patient presents with    sore on perineum       History of Present Illness: Rosalia Fernández is a 71 y.o. female that presents today 5/12/2022 for   Chief Complaint   Patient presents with    sore on perineum     For 2 days history of HSV years ago.  Very sore and swollen on the right labia feels like burning.     Past Medical History:   Diagnosis Date    Colon polyps     Diverticulitis     Endometriosis     Hemorrhage     "microhemorrhage of brain"     Infertility, female     Melanoma     MVP (mitral valve prolapse)     Osteoarthritis        Past Surgical History:   Procedure Laterality Date    COLONOSCOPY  06/29/2021    Dr. Jeanette MOSQUERA  2016    Endometriosis      HERNIA REPAIR      bladder hernia    HYSTERECTOMY      Patel, with MESH TAHBSO    Melanoma Removal      OOPHORECTOMY      TOE SURGERY Left     TONSILLECTOMY         Current Outpatient Medications   Medication Sig Dispense Refill    ascorbic acid (VITAMIN C) 500 MG tablet Take 500 mg by mouth once daily.      CALCIUM CITRATE/VITAMIN D3 (CALCIUM CITRATE + ORAL) Take 1 tablet by mouth Daily.      celecoxib (CELEBREX) 200 MG capsule Take 1 capsule (200 mg total) by mouth 2 (two) times daily as needed for Pain. 60 capsule 3    cholecalciferol, vitamin D3, (VITAMIN D3) 25 mcg (1,000 unit) capsule Take 1,000 Units by mouth once daily.      coQ10, ubiquinol, 100 mg Cap Take 1 tablet by mouth once daily.      krill oil 500 mg Cap Take 2 capsules by mouth Daily.      LACTOBACILLUS ACIDOPHILUS (PROBIOTIC ORAL) Take by mouth.      multivitamin (THERAGRAN) per tablet Take 1 tablet by mouth once daily.      psyllium (METAMUCIL) powder Take 1 packet by mouth once daily. Prn      valACYclovir (VALTREX) 1000 MG tablet Take 1 tablet (1,000 mg total) by mouth 2 (two) times daily. for 7 days 14 tablet 0     No current facility-administered medications for this visit.       Review of patient's allergies indicates: " "  Allergen Reactions    Prednisone Other (See Comments)     Blurry vision, not sure if dose was too much for her.       Family History   Problem Relation Age of Onset    Thyroid disease Mother     Dementia Mother     Hypertension Mother     Arthritis Father     Breast cancer Sister 68       Social History     Tobacco Use    Smoking status: Never Smoker    Smokeless tobacco: Never Used   Substance Use Topics    Alcohol use: Yes     Alcohol/week: 4.0 standard drinks     Types: 4 Glasses of wine per week     Comment: 3 or 4 a day    Drug use: No       OB History    Para Term  AB Living   0 0 0 0 0 0   SAB IAB Ectopic Multiple Live Births   0 0 0 0             /70   Ht 5' 4" (1.626 m)   Wt 50.3 kg (110 lb 14.3 oz)   Physical Exam:  APPEARANCE: Well nourished, well developed, in no acute distress.  SKIN: Normal skin turgor, no lesions.  NECK: Neck symmetric without masses   RESPIRATORY: Normal respiratory effort with no retractions or use of accessory muscles  CARDIOVASCULAR: Peripheral vascular system with no swelling no varicosities and palpation of pulses normal  LYMPHATIC: No enlargements of the lymph nodes noted in the neck, axillae, or groin  ABDOMEN: Soft. No tenderness or masses. No hepatosplenomegaly. No hernias.  PELVIC: Normal external female genitalia with +++ ulcerated lesion  Normal hair distribution. Adequate perineal body, normal urethral meatus. Urethra with no masses.  Bladder nontender. Vagina moist and well rugated without lesions or discharge.  No significant cystocele or rectocele. Adnexa without masses or tenderness. Urethra and bladder normal.  EXTREMITIES: No clubbing cyanosis or edema.    ASSESSMENT/PLAN:  HSV (herpes simplex virus) anogenital infection  -     valACYclovir (VALTREX) 1000 MG tablet; Take 1 tablet (1,000 mg total) by mouth 2 (two) times daily. for 7 days  Dispense: 14 tablet; Refill: 0  -     HSV by Rapid PCR, Non-Blood [facility]; Vagina    Other " orders  -     Discontinue: valACYclovir (VALTREX) 500 MG tablet; Take 2 tablets (1,000 mg total) by mouth 2 (two) times daily. for 7 days  Dispense: 14 tablet; Refill: 0          20 minutes spent today preparing reviewing previous external notes, reviewing previous results, performing medical examination, orders tests or medications, counseling and documenting.

## 2022-05-15 LAB
HSV1 DNA SPEC QL NAA+PROBE: NEGATIVE
HSV2 DNA SPEC QL NAA+PROBE: POSITIVE
SPECIMEN SOURCE: ABNORMAL

## 2022-05-16 ENCOUNTER — TELEPHONE (OUTPATIENT)
Dept: OBSTETRICS AND GYNECOLOGY | Facility: CLINIC | Age: 72
End: 2022-05-16
Payer: MEDICARE

## 2022-05-16 NOTE — TELEPHONE ENCOUNTER
----- Message from Kamron Hopkins sent at 5/16/2022  8:04 AM CDT -----  Contact: pt at [phone]  Type: Needs Medical Advice  Who Called:  pt  Best Call Back Number: [phone]  Additional Information: pt is calling the office regarding a medication that the dr prescribed for her last week she has a question about it. Please call back and advise.

## 2022-07-19 ENCOUNTER — CLINICAL SUPPORT (OUTPATIENT)
Dept: AUDIOLOGY | Facility: CLINIC | Age: 72
End: 2022-07-19
Payer: MEDICARE

## 2022-07-19 DIAGNOSIS — H91.90 HEARING DIFFICULTY, UNSPECIFIED LATERALITY: Primary | ICD-10-CM

## 2022-07-19 DIAGNOSIS — H90.3 BILATERAL SENSORINEURAL HEARING LOSS: ICD-10-CM

## 2022-07-19 PROCEDURE — 99999 PR PBB SHADOW E&M-EST. PATIENT-LVL I: ICD-10-PCS | Mod: PBBFAC,,,

## 2022-07-19 PROCEDURE — 92552 PURE TONE AUDIOMETRY AIR: CPT | Mod: S$GLB,,, | Performed by: AUDIOLOGIST-HEARING AID FITTER

## 2022-07-19 PROCEDURE — 92556 SPEECH AUDIOMETRY COMPLETE: CPT | Mod: S$GLB,,, | Performed by: AUDIOLOGIST-HEARING AID FITTER

## 2022-07-19 PROCEDURE — 99999 PR PBB SHADOW E&M-EST. PATIENT-LVL I: CPT | Mod: PBBFAC,,,

## 2022-07-19 PROCEDURE — 92556 PR SPEECH AUDIOMETRY, COMPLETE: ICD-10-PCS | Mod: S$GLB,,, | Performed by: AUDIOLOGIST-HEARING AID FITTER

## 2022-07-19 PROCEDURE — 92552 PR PURE TONE AUDIOMETRY, AIR: ICD-10-PCS | Mod: S$GLB,,, | Performed by: AUDIOLOGIST-HEARING AID FITTER

## 2022-07-19 NOTE — PROGRESS NOTES
Rosalia Fernández was seen 07/19/2022 for an audiological evaluation. Pertinent complaints today include hearing loss. Pt denies tinnitus, family Hx of HL and loud noise exposure. Otoscopy revealed minimal cerumen in both ears. The tympanic membrane was visualized AU prior to proceeding with the hearing testing. She wears Mechio hearing aids.    Results reveal a bilateral mild sloping to severe sensorineural hearing loss.    Speech Reception Thresholds were  35 dBHL for the right ear and 35 dBHL for the left ear.    Word recognition scores were good for the right ear and good for the left ear. No significant changes were noted when compared to previous hearing testing from 7/16/21.     Audiogram results were reviewed in detail with patient and all questions were answered. Results will be reviewed by the referring provider at the completion of this note. Recommend continued daily use of binaural amplification following adjustment to new thresholds from today's hearing test, repeat hearing testing in one year and bilateral hearing protection with either muffs or in-ear protection in loud noises.

## 2022-08-03 ENCOUNTER — TELEPHONE (OUTPATIENT)
Dept: NEUROLOGY | Facility: CLINIC | Age: 72
End: 2022-08-03
Payer: MEDICARE

## 2022-08-04 ENCOUNTER — PATIENT MESSAGE (OUTPATIENT)
Dept: NEUROLOGY | Facility: CLINIC | Age: 72
End: 2022-08-04
Payer: MEDICARE

## 2022-08-04 DIAGNOSIS — H91.90 HEARING LOSS, UNSPECIFIED HEARING LOSS TYPE, UNSPECIFIED LATERALITY: ICD-10-CM

## 2022-08-04 DIAGNOSIS — R26.89 IMBALANCE: ICD-10-CM

## 2022-08-04 DIAGNOSIS — R41.3 MEMORY CHANGE: ICD-10-CM

## 2022-08-04 DIAGNOSIS — G93.9 PONTINE LESION: Primary | ICD-10-CM

## 2022-08-08 ENCOUNTER — PATIENT MESSAGE (OUTPATIENT)
Dept: NEUROLOGY | Facility: CLINIC | Age: 72
End: 2022-08-08
Payer: MEDICARE

## 2022-08-19 ENCOUNTER — HOSPITAL ENCOUNTER (OUTPATIENT)
Dept: RADIOLOGY | Facility: HOSPITAL | Age: 72
Discharge: HOME | End: 2022-08-19
Attending: PSYCHIATRY & NEUROLOGY
Payer: MEDICARE

## 2022-08-19 DIAGNOSIS — R41.3 MEMORY CHANGE: ICD-10-CM

## 2022-08-19 DIAGNOSIS — R26.89 IMBALANCE: ICD-10-CM

## 2022-08-19 DIAGNOSIS — H91.90 HEARING LOSS, UNSPECIFIED HEARING LOSS TYPE, UNSPECIFIED LATERALITY: ICD-10-CM

## 2022-08-19 DIAGNOSIS — G93.9 PONTINE LESION: ICD-10-CM

## 2022-08-19 PROCEDURE — A9585 GADOBUTROL INJECTION: HCPCS | Mod: PO | Performed by: PSYCHIATRY & NEUROLOGY

## 2022-08-19 PROCEDURE — 70553 MRI BRAIN STEM W/O & W/DYE: CPT | Mod: 26,,, | Performed by: RADIOLOGY

## 2022-08-19 PROCEDURE — 70553 MRI BRAIN STEM W/O & W/DYE: CPT | Mod: TC,PO

## 2022-08-19 PROCEDURE — 70553 MRI BRAIN W WO CONTRAST: ICD-10-PCS | Mod: 26,,, | Performed by: RADIOLOGY

## 2022-08-19 PROCEDURE — 25500020 PHARM REV CODE 255: Mod: PO | Performed by: PSYCHIATRY & NEUROLOGY

## 2022-08-19 RX ADMIN — GADOBUTROL 5 ML: 604.72 INJECTION INTRAVENOUS at 02:08

## 2022-08-22 ENCOUNTER — TELEPHONE (OUTPATIENT)
Dept: NEUROLOGY | Facility: CLINIC | Age: 72
End: 2022-08-22
Payer: MEDICARE

## 2022-08-22 NOTE — TELEPHONE ENCOUNTER
----- Message from Mahesh aMrcial sent at 8/22/2022 10:34 AM CDT -----  Regarding: test results  Type:  Test Results    Who Called:  Rosalia Julio    Name of Test (Lab/Mammo/Etc):  MRI    Date of Test:  8/19/22    Ordering Provider:  Dr Nagy    Where the test was performed:  [address] [place] [address] Radiology    Best Call Back Number:  [phone]     Additional Information:  pt has seen results on myChart and needs to know if needs to be seen sooner than current appt to discuss results.

## 2022-08-22 NOTE — TELEPHONE ENCOUNTER
Spoke with patient and informed per Dr. Nagy MRI is stable and nothing worrisome seen. Ok to wait until November for follow up appointment. Patient voiced understanding.

## 2022-11-30 ENCOUNTER — OFFICE VISIT (OUTPATIENT)
Dept: NEUROLOGY | Facility: CLINIC | Age: 72
End: 2022-11-30
Payer: MEDICARE

## 2022-11-30 VITALS
HEIGHT: 64 IN | HEART RATE: 85 BPM | WEIGHT: 111.25 LBS | SYSTOLIC BLOOD PRESSURE: 125 MMHG | DIASTOLIC BLOOD PRESSURE: 74 MMHG | BODY MASS INDEX: 18.99 KG/M2

## 2022-11-30 DIAGNOSIS — G93.9 PONTINE LESION: Primary | ICD-10-CM

## 2022-11-30 DIAGNOSIS — R29.90 EPISODE OF TRANSIENT NEUROLOGIC SYMPTOMS: ICD-10-CM

## 2022-11-30 PROCEDURE — 1101F PR PT FALLS ASSESS DOC 0-1 FALLS W/OUT INJ PAST YR: ICD-10-PCS | Mod: CPTII,S$GLB,, | Performed by: PSYCHIATRY & NEUROLOGY

## 2022-11-30 PROCEDURE — 3008F PR BODY MASS INDEX (BMI) DOCUMENTED: ICD-10-PCS | Mod: CPTII,S$GLB,, | Performed by: PSYCHIATRY & NEUROLOGY

## 2022-11-30 PROCEDURE — 1125F AMNT PAIN NOTED PAIN PRSNT: CPT | Mod: CPTII,S$GLB,, | Performed by: PSYCHIATRY & NEUROLOGY

## 2022-11-30 PROCEDURE — 1101F PT FALLS ASSESS-DOCD LE1/YR: CPT | Mod: CPTII,S$GLB,, | Performed by: PSYCHIATRY & NEUROLOGY

## 2022-11-30 PROCEDURE — 3074F SYST BP LT 130 MM HG: CPT | Mod: CPTII,S$GLB,, | Performed by: PSYCHIATRY & NEUROLOGY

## 2022-11-30 PROCEDURE — 3288F PR FALLS RISK ASSESSMENT DOCUMENTED: ICD-10-PCS | Mod: CPTII,S$GLB,, | Performed by: PSYCHIATRY & NEUROLOGY

## 2022-11-30 PROCEDURE — 99213 OFFICE O/P EST LOW 20 MIN: CPT | Mod: S$GLB,,, | Performed by: PSYCHIATRY & NEUROLOGY

## 2022-11-30 PROCEDURE — 99999 PR PBB SHADOW E&M-EST. PATIENT-LVL III: ICD-10-PCS | Mod: PBBFAC,,, | Performed by: PSYCHIATRY & NEUROLOGY

## 2022-11-30 PROCEDURE — 3074F PR MOST RECENT SYSTOLIC BLOOD PRESSURE < 130 MM HG: ICD-10-PCS | Mod: CPTII,S$GLB,, | Performed by: PSYCHIATRY & NEUROLOGY

## 2022-11-30 PROCEDURE — 99999 PR PBB SHADOW E&M-EST. PATIENT-LVL III: CPT | Mod: PBBFAC,,, | Performed by: PSYCHIATRY & NEUROLOGY

## 2022-11-30 PROCEDURE — 1160F PR REVIEW ALL MEDS BY PRESCRIBER/CLIN PHARMACIST DOCUMENTED: ICD-10-PCS | Mod: CPTII,S$GLB,, | Performed by: PSYCHIATRY & NEUROLOGY

## 2022-11-30 PROCEDURE — 1125F PR PAIN SEVERITY QUANTIFIED, PAIN PRESENT: ICD-10-PCS | Mod: CPTII,S$GLB,, | Performed by: PSYCHIATRY & NEUROLOGY

## 2022-11-30 PROCEDURE — 3008F BODY MASS INDEX DOCD: CPT | Mod: CPTII,S$GLB,, | Performed by: PSYCHIATRY & NEUROLOGY

## 2022-11-30 PROCEDURE — 1159F MED LIST DOCD IN RCRD: CPT | Mod: CPTII,S$GLB,, | Performed by: PSYCHIATRY & NEUROLOGY

## 2022-11-30 PROCEDURE — 3288F FALL RISK ASSESSMENT DOCD: CPT | Mod: CPTII,S$GLB,, | Performed by: PSYCHIATRY & NEUROLOGY

## 2022-11-30 PROCEDURE — 3078F DIAST BP <80 MM HG: CPT | Mod: CPTII,S$GLB,, | Performed by: PSYCHIATRY & NEUROLOGY

## 2022-11-30 PROCEDURE — 1159F PR MEDICATION LIST DOCUMENTED IN MEDICAL RECORD: ICD-10-PCS | Mod: CPTII,S$GLB,, | Performed by: PSYCHIATRY & NEUROLOGY

## 2022-11-30 PROCEDURE — 99213 PR OFFICE/OUTPT VISIT, EST, LEVL III, 20-29 MIN: ICD-10-PCS | Mod: S$GLB,,, | Performed by: PSYCHIATRY & NEUROLOGY

## 2022-11-30 PROCEDURE — 1160F RVW MEDS BY RX/DR IN RCRD: CPT | Mod: CPTII,S$GLB,, | Performed by: PSYCHIATRY & NEUROLOGY

## 2022-11-30 PROCEDURE — 3078F PR MOST RECENT DIASTOLIC BLOOD PRESSURE < 80 MM HG: ICD-10-PCS | Mod: CPTII,S$GLB,, | Performed by: PSYCHIATRY & NEUROLOGY

## 2022-11-30 NOTE — PROGRESS NOTES
Date: 11/30/2022    Patient ID: Rosalia Fernández is a 71 y.o. female.    Chief Complaint: Follow-up      History of Present Illness:  Ms. Fernández is a 71 y.o. 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      Interval history: She notes she feels unsteady when walking. MRI brain in August 2022 was stable. She notes that she has correlated this unsteadiness to not drinking enough water.     She had a hearing test and had hearing loss and had hearing aids adjusted.        Prior documentation:   At the end of September 28, 2018, she had an episode of bilateral arm weakness. She was playing golf and had sudden onset of bilateral arm weakness. This was severe and she couldn't lift her hands up even to touch her nose. No numbness. The left arm was affected more so but couldn't get either up to her nose. It lasted a few minutes and then resolved completely. No vision changes. No speech changes. No leg weakness. No pain.      The next episode occurred October 8, 2018. She was shopping and just suddenly had bilateral arm weakness. It was not as severe but again lasted less than 5 minutes and resolved. She went to ER and CT head was negative.      Workup revealed no c-spine abnormality that would explain the arm weakness. I thought initially this might have been a TIA of the spinal cord and recommended aspirin 81 mg daily. MRA revealed no concerning stenosis or cause. 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 Because of this and risk for bleeding, I advised her to stop the aspirin we started previously.      She has not had any further episodes of arm weakness. Her  notes she has had some mid back pain and she picks up things that are too heavy for her.      We repeated the MRI of  "the brain in June 2019 which showed a stable pontine capillary telangiectasia. No further episodes of arm weakness.     Allergies:  Review of patient's allergies indicates:   Allergen Reactions    Prednisone Other (See Comments)     Blurry vision, not sure if dose was too much for her.       Current Medications:  Current Outpatient Medications   Medication Sig Dispense Refill    ascorbic acid (VITAMIN C) 500 MG tablet Take 500 mg by mouth once daily.      CALCIUM CITRATE/VITAMIN D3 (CALCIUM CITRATE + ORAL) Take 1 tablet by mouth Daily.      cholecalciferol, vitamin D3, (VITAMIN D3) 25 mcg (1,000 unit) capsule Take 1,000 Units by mouth once daily.      coQ10, ubiquinol, 100 mg Cap Take 1 tablet by mouth once daily.      krill oil 500 mg Cap Take 2 capsules by mouth Daily.      LACTOBACILLUS ACIDOPHILUS (PROBIOTIC ORAL) Take by mouth.      multivitamin (THERAGRAN) per tablet Take 1 tablet by mouth once daily.      UNABLE TO FIND Green Tea Extract 500 mg tablet 1 daily      celecoxib (CELEBREX) 200 MG capsule Take 1 capsule (200 mg total) by mouth 2 (two) times daily as needed for Pain. (Patient not taking: Reported on 11/30/2022) 60 capsule 3    oseltamivir (TAMIFLU) 75 MG capsule Take 1 capsule (75 mg total) by mouth once daily. (Patient not taking: Reported on 11/30/2022) 10 capsule 0     No current facility-administered medications for this visit.       Past Medical History:  Past Medical History:   Diagnosis Date    Colon polyps     Diverticulitis     Endometriosis     Hemorrhage     "microhemorrhage of brain"     Infertility, female     Melanoma     MVP (mitral valve prolapse)     Osteoarthritis        Past Surgical History:  Past Surgical History:   Procedure Laterality Date    COLONOSCOPY  06/29/2021    Dr. Jeanette MOSQUERA  2016    Endometriosis      HERNIA REPAIR      bladder hernia    HYSTERECTOMY      Patel, with MESH TAHBSO    Melanoma Removal      OOPHORECTOMY      TOE SURGERY Left     TONSILLECTOMY   " "      Family History:  family history includes Arthritis in her father; Breast cancer (age of onset: 68) in her sister; Dementia in her mother; Hypertension in her mother; Thyroid disease in her mother.    Social History:   reports that she has never smoked. She has never used smokeless tobacco. She reports current alcohol use of about 4.0 standard drinks per week. She reports that she does not use drugs.    Physical Exam:  Vitals:    11/30/22 1455   BP: 125/74   Pulse: 85   Weight: 50.4 kg (111 lb 3.6 oz)   Height: 5' 4" (1.626 m)   PainSc:   5   PainLoc: Back     Body mass index is 19.09 kg/m².    Neurological Exam:  Mental status: Awake and alert  Speech language: No dysarthria or aphasia on conversation  Cranial nerves: Face symmetric  Motor: Moves all extremities well  Coordination: No ataxia. No tremor.      Data:  I have personally reviewed other provider's notes, labs, & imaging made available to me today.     Labs:  CBC:   Lab Results   Component Value Date    WBC 5.79 06/17/2021    HGB 14.0 06/17/2021    HCT 42.6 06/17/2021     06/17/2021    MCV 91 06/17/2021    RDW 11.9 06/17/2021     BMP:   Lab Results   Component Value Date     03/24/2022    K 3.9 03/24/2022     03/24/2022    CO2 29 03/24/2022    BUN 19 (H) 03/24/2022    CREATININE 0.74 08/16/2022     (H) 03/24/2022    CALCIUM 9.8 03/24/2022    MG 2.0 10/08/2018    PHOS 3.8 10/08/2018     LFTS;   Lab Results   Component Value Date    PROT 6.9 03/24/2022    ALBUMIN 4.4 03/24/2022    BILITOT 0.7 03/24/2022    AST 26 03/24/2022    ALKPHOS 79 03/24/2022    ALT 18 03/24/2022     COAGS: No results found for: INR, PROTIME, PTT  FLP:   Lab Results   Component Value Date    CHOL 224 (H) 04/16/2021    HDL 67 04/16/2021    LDLCALC 133.8 04/16/2021    TRIG 116 04/16/2021    CHOLHDL 29.9 04/16/2021           Assessment and Plan:  Ms. Fernández is a 71 y.o. female here for followup of arm numbness and brainstem vessel abnormality. She is doing " well with no current issues. Exam is normal. Encouraged hydration. Will see her back in a year. Can space out MRI screenings to every few years unless symptoms arise.       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

## 2023-01-06 ENCOUNTER — TELEPHONE (OUTPATIENT)
Dept: RHEUMATOLOGY | Facility: CLINIC | Age: 73
End: 2023-01-06
Payer: MEDICARE

## 2023-01-06 NOTE — TELEPHONE ENCOUNTER
----- Message from Parris Ellis, Patient Care Assistant sent at 1/5/2023 10:13 AM CST -----  Contact: self  Pt has a referral in the system, Please call back to [facility] and advise [phone]  thanks

## 2023-01-16 ENCOUNTER — PATIENT MESSAGE (OUTPATIENT)
Dept: RHEUMATOLOGY | Facility: CLINIC | Age: 73
End: 2023-01-16
Payer: MEDICARE

## 2023-01-18 ENCOUNTER — TELEPHONE (OUTPATIENT)
Dept: ENDOCRINOLOGY | Facility: CLINIC | Age: 73
End: 2023-01-18
Payer: MEDICARE

## 2023-01-19 NOTE — TELEPHONE ENCOUNTER
----- Message from Marisabel Jones sent at 1/12/2023  2:41 PM CST -----  Regarding: pt call back  Name of Who is Calling:  TERESA MADRIGAL [[id number]]      What is the request in detail: Pt is okay with being a new pt in July 2023. She would like to schedule the appt. Please advise.       [facility]       What Number to Call Back [facility]: Telephone Information:  Mobile          [phone] or [phone] (home)

## 2023-01-19 NOTE — TELEPHONE ENCOUNTER
Spoke to pt and adv she has referral in system and will be added to wtl and will be contacted as soon as appt becomes avail, voiced understanding.

## 2023-03-02 ENCOUNTER — TELEPHONE (OUTPATIENT)
Dept: NEUROLOGY | Facility: CLINIC | Age: 73
End: 2023-03-02
Payer: MEDICARE

## 2023-03-02 ENCOUNTER — TELEPHONE (OUTPATIENT)
Dept: RHEUMATOLOGY | Facility: CLINIC | Age: 73
End: 2023-03-02
Payer: MEDICARE

## 2023-03-02 NOTE — TELEPHONE ENCOUNTER
Spoke with patient and informed no one from Dr. Nagy clinic called her but looks like someone from rheumatology did. Patient states she is unsure if she already spoke with someone.

## 2023-03-02 NOTE — TELEPHONE ENCOUNTER
----- Message from Truman Perez sent at 3/2/2023  1:24 PM CST -----  Regarding: returning call  Contact: Patient  Type:  Patient Returning Call    Who Called: Patient   Who Left Message for Patient:  notsure  Does the patient know what this is regarding?:  no  Best Call Back Number:  [phone] (home)     Case number [id number]

## 2023-03-03 ENCOUNTER — PATIENT MESSAGE (OUTPATIENT)
Dept: RHEUMATOLOGY | Facility: CLINIC | Age: 73
End: 2023-03-03
Payer: MEDICARE

## 2023-03-15 ENCOUNTER — PATIENT MESSAGE (OUTPATIENT)
Dept: NEUROLOGY | Facility: CLINIC | Age: 73
End: 2023-03-15
Payer: MEDICARE

## 2023-03-21 ENCOUNTER — OFFICE VISIT (OUTPATIENT)
Dept: PAIN MEDICINE | Facility: CLINIC | Age: 73
End: 2023-03-21
Payer: MEDICARE

## 2023-03-21 VITALS
SYSTOLIC BLOOD PRESSURE: 141 MMHG | RESPIRATION RATE: 16 BRPM | HEIGHT: 64 IN | DIASTOLIC BLOOD PRESSURE: 63 MMHG | BODY MASS INDEX: 18.85 KG/M2 | WEIGHT: 110.44 LBS | HEART RATE: 84 BPM

## 2023-03-21 DIAGNOSIS — M54.9 DORSALGIA, UNSPECIFIED: Primary | ICD-10-CM

## 2023-03-21 DIAGNOSIS — M47.816 LUMBAR SPONDYLOSIS: ICD-10-CM

## 2023-03-21 DIAGNOSIS — M54.12 CERVICAL RADICULOPATHY: ICD-10-CM

## 2023-03-21 PROCEDURE — 99204 PR OFFICE/OUTPT VISIT, NEW, LEVL IV, 45-59 MIN: ICD-10-PCS | Mod: S$GLB,,, | Performed by: ANESTHESIOLOGY

## 2023-03-21 PROCEDURE — 1160F RVW MEDS BY RX/DR IN RCRD: CPT | Mod: CPTII,S$GLB,, | Performed by: ANESTHESIOLOGY

## 2023-03-21 PROCEDURE — 3044F HG A1C LEVEL LT 7.0%: CPT | Mod: CPTII,S$GLB,, | Performed by: ANESTHESIOLOGY

## 2023-03-21 PROCEDURE — 3078F PR MOST RECENT DIASTOLIC BLOOD PRESSURE < 80 MM HG: ICD-10-PCS | Mod: CPTII,S$GLB,, | Performed by: ANESTHESIOLOGY

## 2023-03-21 PROCEDURE — 1160F PR REVIEW ALL MEDS BY PRESCRIBER/CLIN PHARMACIST DOCUMENTED: ICD-10-PCS | Mod: CPTII,S$GLB,, | Performed by: ANESTHESIOLOGY

## 2023-03-21 PROCEDURE — 3288F PR FALLS RISK ASSESSMENT DOCUMENTED: ICD-10-PCS | Mod: CPTII,S$GLB,, | Performed by: ANESTHESIOLOGY

## 2023-03-21 PROCEDURE — 3288F FALL RISK ASSESSMENT DOCD: CPT | Mod: CPTII,S$GLB,, | Performed by: ANESTHESIOLOGY

## 2023-03-21 PROCEDURE — 3078F DIAST BP <80 MM HG: CPT | Mod: CPTII,S$GLB,, | Performed by: ANESTHESIOLOGY

## 2023-03-21 PROCEDURE — 3077F SYST BP >= 140 MM HG: CPT | Mod: CPTII,S$GLB,, | Performed by: ANESTHESIOLOGY

## 2023-03-21 PROCEDURE — 99999 PR PBB SHADOW E&M-EST. PATIENT-LVL IV: CPT | Mod: PBBFAC,,, | Performed by: ANESTHESIOLOGY

## 2023-03-21 PROCEDURE — 1125F AMNT PAIN NOTED PAIN PRSNT: CPT | Mod: CPTII,S$GLB,, | Performed by: ANESTHESIOLOGY

## 2023-03-21 PROCEDURE — 1159F MED LIST DOCD IN RCRD: CPT | Mod: CPTII,S$GLB,, | Performed by: ANESTHESIOLOGY

## 2023-03-21 PROCEDURE — 1101F PR PT FALLS ASSESS DOC 0-1 FALLS W/OUT INJ PAST YR: ICD-10-PCS | Mod: CPTII,S$GLB,, | Performed by: ANESTHESIOLOGY

## 2023-03-21 PROCEDURE — 3044F PR MOST RECENT HEMOGLOBIN A1C LEVEL <7.0%: ICD-10-PCS | Mod: CPTII,S$GLB,, | Performed by: ANESTHESIOLOGY

## 2023-03-21 PROCEDURE — 99999 PR PBB SHADOW E&M-EST. PATIENT-LVL IV: ICD-10-PCS | Mod: PBBFAC,,, | Performed by: ANESTHESIOLOGY

## 2023-03-21 PROCEDURE — 3008F BODY MASS INDEX DOCD: CPT | Mod: CPTII,S$GLB,, | Performed by: ANESTHESIOLOGY

## 2023-03-21 PROCEDURE — 3008F PR BODY MASS INDEX (BMI) DOCUMENTED: ICD-10-PCS | Mod: CPTII,S$GLB,, | Performed by: ANESTHESIOLOGY

## 2023-03-21 PROCEDURE — 1101F PT FALLS ASSESS-DOCD LE1/YR: CPT | Mod: CPTII,S$GLB,, | Performed by: ANESTHESIOLOGY

## 2023-03-21 PROCEDURE — 99204 OFFICE O/P NEW MOD 45 MIN: CPT | Mod: S$GLB,,, | Performed by: ANESTHESIOLOGY

## 2023-03-21 PROCEDURE — 1159F PR MEDICATION LIST DOCUMENTED IN MEDICAL RECORD: ICD-10-PCS | Mod: CPTII,S$GLB,, | Performed by: ANESTHESIOLOGY

## 2023-03-21 PROCEDURE — 1125F PR PAIN SEVERITY QUANTIFIED, PAIN PRESENT: ICD-10-PCS | Mod: CPTII,S$GLB,, | Performed by: ANESTHESIOLOGY

## 2023-03-21 PROCEDURE — 3077F PR MOST RECENT SYSTOLIC BLOOD PRESSURE >= 140 MM HG: ICD-10-PCS | Mod: CPTII,S$GLB,, | Performed by: ANESTHESIOLOGY

## 2023-03-21 RX ORDER — TRAMADOL HYDROCHLORIDE 50 MG/1
50 TABLET ORAL EVERY 12 HOURS PRN
Qty: 14 TABLET | Refills: 0 | Status: SHIPPED | OUTPATIENT
Start: 2023-03-21 | End: 2023-03-28 | Stop reason: SDUPTHER

## 2023-03-21 NOTE — PROGRESS NOTES
[facility] New Patient Evaluation      Referred by: Dr. Acosta Savage II    PCP:     CC:   Chief Complaint   Patient presents with    Back Pain     C/o left LBP. Rates pain at 6.       No flowsheet data found.      HPI:   Rosalia Fernández is a 72 y.o. female who presents with back pain.  She is had chronic lower back pain for many years and has been gradually worsening.  Today she reports her back pain is primarily radiating down into the left buttock and occasionally down the outside of the left leg stopping at the knee.  She denies any numbness or weakness.  Her pain is worse with activities, walking, bending, coughing.  She is unsure of any specific thing that improves her pain as her pain is constant.      Pain Intervention History:      Past Spine Surgical History:      Past and current medications:  Antineuropathics:  NSAIDs: mobic, celebrex  Physical therapy: yes, completed   Antidepressants:  Muscle relaxers:  Opioids:  Antiplatelets/Anticoagulants:    History:    Current Outpatient Medications:     acetaminophen (TYLENOL) 500 MG tablet, Take 1,000 mg by mouth every 6 (six) hours as needed for Pain., Disp: , Rfl:     ascorbic acid (VITAMIN C) 500 MG tablet, Take 500 mg by mouth once daily., Disp: , Rfl:     CALCIUM CITRATE/VITAMIN D3 (CALCIUM CITRATE + ORAL), Take 1 tablet by mouth Daily., Disp: , Rfl:     cholecalciferol, vitamin D3, (VITAMIN D3) 25 mcg (1,000 unit) capsule, Take 2,000 Units by mouth once daily., Disp: , Rfl:     coQ10, ubiquinol, 100 mg Cap, Take 1 tablet by mouth once daily., Disp: , Rfl:     krill oil 500 mg Cap, Take 2 capsules by mouth Daily., Disp: , Rfl:     LACTOBACILLUS ACIDOPHILUS (PROBIOTIC ORAL), Take by mouth., Disp: , Rfl:     LIDOcaine 4 % PtMd, Apply topically daily as needed., Disp: , Rfl:     multivitamin (THERAGRAN) per tablet, Take 1 tablet by mouth once daily., Disp: , Rfl:     celecoxib (CELEBREX) 200 MG capsule, Take 200 mg by mouth 2 (two) times daily  "as needed for Pain., Disp: , Rfl:     meloxicam (MOBIC) 15 MG tablet, Take 1 tablet (15 mg total) by mouth once daily. (Patient not taking: Reported on 3/21/2023), Disp: 30 tablet, Rfl: 3    traMADoL (ULTRAM) 50 mg tablet, Take 1 tablet (50 mg total) by mouth every 12 (twelve) hours as needed for Pain., Disp: 14 tablet, Rfl: 0    Past Medical History:   Diagnosis Date    Colon polyps     Diverticulitis     Endometriosis     Hemorrhage     "microhemorrhage of brain"     Infertility, female     Melanoma     MVP (mitral valve prolapse)     Osteoarthritis        Past Surgical History:   Procedure Laterality Date    COLONOSCOPY  06/29/2021    Dr. Jeanette MOSQUERA  2016    Endometriosis      HERNIA REPAIR      bladder hernia    HYSTERECTOMY      Patel, with MESH TAHBSO    Melanoma Removal      OOPHORECTOMY      TOE SURGERY Left     TONSILLECTOMY         Family History   Problem Relation Age of Onset    Thyroid disease Mother     Dementia Mother     Hypertension Mother     Arthritis Father     Breast cancer Sister 68       Social History     Socioeconomic History    Marital status:      Spouse name: Mason     Number of children: 0   Occupational History    Occupation: retired   Tobacco Use    Smoking status: Never    Smokeless tobacco: Never   Substance and Sexual Activity    Alcohol use: Yes     Alcohol/week: 6.0 - 7.0 standard drinks     Types: 6 - 7 Glasses of wine per week     Comment: 3 or 4 a day    Drug use: No    Sexual activity: Yes     Partners: Male     Social Determinants of Health     Financial Resource Strain: Low Risk     Difficulty of Paying Living Expenses: Not hard at all   Food Insecurity: No Food Insecurity    Worried About Running Out of Food in the Last Year: Never true    Ran Out of Food in the Last Year: Never true   Transportation Needs: No Transportation Needs    Lack of Transportation (Medical): No    Lack of Transportation (Non-Medical): No   Physical Activity: Sufficiently Active    Days " "of Exercise per Week: 4 days    Minutes of Exercise per Session: 40 min   Stress: Stress Concern Present    Feeling of Stress : To some extent   Social Connections: Unknown    Frequency of Communication with Friends and Family: More than three times a week    Frequency of Social Gatherings with Friends and Family: More than three times a week    Active Member of Clubs or Organizations: Yes    Attends Club or Organization Meetings: 1 to 4 times per year    Marital Status:    Housing Stability: Low Risk     Unable to Pay for Housing in the Last Year: No    Number of Places Lived in the Last Year: 1    Unstable Housing in the Last Year: No       Review of patient's allergies indicates:   Allergen Reactions    Prednisone Other (See Comments)     Blurry vision, not sure if dose was too much for her.       Review of Systems:  12 point review of systems is negative.    Physical Exam:  Vitals:    03/21/23 1048 03/21/23 1049   BP: (!) 141/63    Pulse: 84    Resp: 16    Weight: 50.1 kg (110 lb 7.2 oz)    Height: 5' 4" (1.626 m)    PainSc:   6   6   PainLoc: Back      Body mass index is 18.96 kg/m².    Gen: NAD  Psych: mood appropriate for given condition  HEENT: eyes anicteric   CV: RRR, 2+ radial pulse  HEENT: anicteric   Respiratory: non-labored, no signs of respiratory distress  Abd: non-distended  Skin: warm, dry and intact.  Gait: No antalgic gait.     Mild pain with lumbar axial facet loading      Sensory:  Intact and symmetrical to light touch in L1-S1 dermatomes bilaterally.    Motor:     Right Left   L2/3 Iliacus Hip flexion  5  5   L3/4 Qudratus Femoris Knee Extension  5  5   L4/5 Tib Anterior Ankle Dorsiflexion   5  5   L5/S1 Extensor Hallicus Longus Great toe extension  5  5   S1/S2 Gastroc/Soleus Plantar Flexion  5  5      Right Left                  Patellar DTR 2+ 0   Achilles DTR 0 0                      Imaging:  Xray lumbar spine 3/13/23  FINDINGS:  There is scoliosis present with convexity to the " right.  This measures approximately 24° with the apex at L1-2.  No fractures are seen.  There are diffuse degenerative changes.    Xray SI joints 3/13/23  FINDINGS:  No acute displaced fracture or dislocation identified.  There are mild degenerative changes noted of the bilateral sacroiliac joints.  Sacral arcuate lines appear intact.  No radiopaque foreign body    Labs:  Lab Results   Component Value Date    HGBA1C 5.3 03/13/2023       Lab Results   Component Value Date    WBC 5.79 06/17/2021    HGB 14.0 06/17/2021    HCT 42.6 06/17/2021    MCV 91 06/17/2021     06/17/2021           Assessment:   Problem List Items Addressed This Visit    None  Visit Diagnoses       Dorsalgia, unspecified    -  Primary    Relevant Orders    MRI Lumbar Spine Without Contrast    Cervical radiculopathy        Lumbar spondylosis                  72 y.o. year old female with PMH history of transient neurologic symptoms, mitral valve prolapse who presents with back pain.  She is had chronic lower back pain for many years and has been gradually worsening.  Today she reports her back pain is primarily radiating down into the left buttock and occasionally down the outside of the left leg stopping at the knee.  She denies any numbness or weakness.  Her pain is worse with activities, walking, bending, coughing.  She is unsure of any specific thing that improves her pain as her pain is constant.    - on exam she has full strength of her lower extremities intact sensation light touch bilateral L2-S1.  Absent left patellar DTR.  Some mild pain with lumbar axial facet loading.  No significant pain with palpation over the SI joints.  - I independently reviewed her lumbar x-ray and she has scoliosis with the apex at the L1-2 level.  She has multilevel bilateral facet arthropathy  - over the past 12 weeks she has completed formal PT and maintains PT directed home exercises  - she has a history of cerebral hemorrhage and had been recommended  to try to avoid NSAIDs as much as possible.  She currently takes Tylenol with some mild relief of her pain  - I think she may have some narrowing in her spine that is causing pain to radiate into the left buttock and lateral left thigh presenting as an atypical radicular presentation  - I have ordered a lumbar MRI to better evaluate her neural anatomy  - I have prescribed her some tramadol to use 1 to 2 times a day as needed for severe pain while we set up imaging and a treatment plan  - we will call her once imaging complete discuss further treatment options    : Reviewed and consistent with medication use as prescribed.    Chase Cody M.D.  Interventional Pain Medicine / Anesthesiology    This note was completed with dictation software and grammatical errors may exist.

## 2023-03-28 ENCOUNTER — TELEPHONE (OUTPATIENT)
Dept: HEPATOLOGY | Facility: CLINIC | Age: 73
End: 2023-03-28
Payer: MEDICARE

## 2023-03-28 RX ORDER — TRAMADOL HYDROCHLORIDE 50 MG/1
50 TABLET ORAL EVERY 12 HOURS PRN
Qty: 14 TABLET | Refills: 0 | Status: SHIPPED | OUTPATIENT
Start: 2023-03-28 | End: 2023-10-05

## 2023-03-28 NOTE — TELEPHONE ENCOUNTER
----- Message from Cherise Castellanos, Patient Care Assistant sent at 3/23/2023  8:36 AM CDT -----  Contact: Pt  Type: Needs Medical Advice    Who Called: Pt  Best Call Back Number: [phone]  Inquiry/Question: Pt is calling to speak with the nurse in regard to her medications. Pt wanted to talk to someone before taking the medication this morning. Please advise. Thank you~

## 2023-03-28 NOTE — TELEPHONE ENCOUNTER
I spoke with the patient.  All questions and concerns addressed.  I have sent her a refill of tramadol.

## 2023-03-28 NOTE — TELEPHONE ENCOUNTER
"Pt states she has been taking medication twice a day wasn't sure how/when to take medication.   States she has not driven as the medication make her feel uncomfortable driving after attempting around the neighborhood,     notified Pt to take medication for severe pain as needed   since it has help -Pt unclear on "as needed" ateempted to explain and give examples    Pt states she has difficulty understanding the instructions given to her in regards to  "as needed" stating she always feels something.    Pt is going out of town next Monday and is not sure if she will need more medication at that time.   Pt states she has four pills left    Spend 10+ minutes on the phone reassuring she will not become addicted or withdraw if she doesn't take twice a day, unsure if Pt fully understands what I was trying to get across in regards to taking the medication.     MRI scheduled for 3/30/23  "

## 2023-03-30 ENCOUNTER — PATIENT MESSAGE (OUTPATIENT)
Dept: PAIN MEDICINE | Facility: CLINIC | Age: 73
End: 2023-03-30
Payer: MEDICARE

## 2023-03-30 ENCOUNTER — HOSPITAL ENCOUNTER (OUTPATIENT)
Dept: RADIOLOGY | Facility: HOSPITAL | Age: 73
Discharge: HOME | End: 2023-03-30
Attending: ANESTHESIOLOGY
Payer: MEDICARE

## 2023-03-30 DIAGNOSIS — M54.9 DORSALGIA, UNSPECIFIED: ICD-10-CM

## 2023-03-30 PROCEDURE — 72148 MRI LUMBAR SPINE WITHOUT CONTRAST: ICD-10-PCS | Mod: 26,,, | Performed by: RADIOLOGY

## 2023-03-30 PROCEDURE — 72148 MRI LUMBAR SPINE W/O DYE: CPT | Mod: 26,,, | Performed by: RADIOLOGY

## 2023-03-30 PROCEDURE — 72148 MRI LUMBAR SPINE W/O DYE: CPT | Mod: TC,PO

## 2023-04-03 ENCOUNTER — TELEPHONE (OUTPATIENT)
Dept: PAIN MEDICINE | Facility: CLINIC | Age: 73
End: 2023-04-03
Payer: MEDICARE

## 2023-04-03 NOTE — TELEPHONE ENCOUNTER
She can stop taking the medicine if it is making her dizzy.    Please schedule her a follow-up in the office to review the MRI and discuss treatment options

## 2023-04-03 NOTE — TELEPHONE ENCOUNTER
----- Message from Odalis Carbajal sent at 3/28/2023  9:21 AM CDT -----  Contact: Self  Patient is calling in regards to the new medication she was given by Dr Cody, stated she will be out of them by Thursday morning and she wanted to let you know the medication has been making her fuzzy in the morning but by the afternoon she does feel a little normal. She is wanting to know if this is the route you are going to continue her on or if you will be doing the injections. Stated she just wanted to add this to the message sent yesterday and is still waiting on a call back to discuss this medication. She can be reached at [phone]. Thank You.

## 2023-04-05 ENCOUNTER — OFFICE VISIT (OUTPATIENT)
Dept: PAIN MEDICINE | Facility: CLINIC | Age: 73
End: 2023-04-05
Payer: MEDICARE

## 2023-04-05 VITALS
DIASTOLIC BLOOD PRESSURE: 67 MMHG | WEIGHT: 106.81 LBS | HEIGHT: 64 IN | SYSTOLIC BLOOD PRESSURE: 148 MMHG | HEART RATE: 78 BPM | BODY MASS INDEX: 18.24 KG/M2

## 2023-04-05 DIAGNOSIS — M41.9 SCOLIOSIS, UNSPECIFIED SCOLIOSIS TYPE, UNSPECIFIED SPINAL REGION: ICD-10-CM

## 2023-04-05 DIAGNOSIS — M54.16 LUMBAR RADICULITIS: ICD-10-CM

## 2023-04-05 DIAGNOSIS — M47.816 LUMBAR SPONDYLOSIS: Primary | ICD-10-CM

## 2023-04-05 PROCEDURE — 3077F SYST BP >= 140 MM HG: CPT | Mod: CPTII,S$GLB,,

## 2023-04-05 PROCEDURE — 1101F PT FALLS ASSESS-DOCD LE1/YR: CPT | Mod: CPTII,S$GLB,,

## 2023-04-05 PROCEDURE — 3008F PR BODY MASS INDEX (BMI) DOCUMENTED: ICD-10-PCS | Mod: CPTII,S$GLB,,

## 2023-04-05 PROCEDURE — 3008F BODY MASS INDEX DOCD: CPT | Mod: CPTII,S$GLB,,

## 2023-04-05 PROCEDURE — 1125F PR PAIN SEVERITY QUANTIFIED, PAIN PRESENT: ICD-10-PCS | Mod: CPTII,S$GLB,,

## 2023-04-05 PROCEDURE — 1159F PR MEDICATION LIST DOCUMENTED IN MEDICAL RECORD: ICD-10-PCS | Mod: CPTII,S$GLB,,

## 2023-04-05 PROCEDURE — 3078F PR MOST RECENT DIASTOLIC BLOOD PRESSURE < 80 MM HG: ICD-10-PCS | Mod: CPTII,S$GLB,,

## 2023-04-05 PROCEDURE — 3288F PR FALLS RISK ASSESSMENT DOCUMENTED: ICD-10-PCS | Mod: CPTII,S$GLB,,

## 2023-04-05 PROCEDURE — 1101F PR PT FALLS ASSESS DOC 0-1 FALLS W/OUT INJ PAST YR: ICD-10-PCS | Mod: CPTII,S$GLB,,

## 2023-04-05 PROCEDURE — 99214 OFFICE O/P EST MOD 30 MIN: CPT | Mod: S$GLB,,,

## 2023-04-05 PROCEDURE — 3078F DIAST BP <80 MM HG: CPT | Mod: CPTII,S$GLB,,

## 2023-04-05 PROCEDURE — 99214 PR OFFICE/OUTPT VISIT, EST, LEVL IV, 30-39 MIN: ICD-10-PCS | Mod: S$GLB,,,

## 2023-04-05 PROCEDURE — 3044F HG A1C LEVEL LT 7.0%: CPT | Mod: CPTII,S$GLB,,

## 2023-04-05 PROCEDURE — 99999 PR PBB SHADOW E&M-EST. PATIENT-LVL V: ICD-10-PCS | Mod: PBBFAC,,,

## 2023-04-05 PROCEDURE — 3288F FALL RISK ASSESSMENT DOCD: CPT | Mod: CPTII,S$GLB,,

## 2023-04-05 PROCEDURE — 3044F PR MOST RECENT HEMOGLOBIN A1C LEVEL <7.0%: ICD-10-PCS | Mod: CPTII,S$GLB,,

## 2023-04-05 PROCEDURE — 1159F MED LIST DOCD IN RCRD: CPT | Mod: CPTII,S$GLB,,

## 2023-04-05 PROCEDURE — 99999 PR PBB SHADOW E&M-EST. PATIENT-LVL V: CPT | Mod: PBBFAC,,,

## 2023-04-05 PROCEDURE — 3077F PR MOST RECENT SYSTOLIC BLOOD PRESSURE >= 140 MM HG: ICD-10-PCS | Mod: CPTII,S$GLB,,

## 2023-04-05 PROCEDURE — 1125F AMNT PAIN NOTED PAIN PRSNT: CPT | Mod: CPTII,S$GLB,,

## 2023-04-05 RX ORDER — SODIUM CHLORIDE, SODIUM LACTATE, POTASSIUM CHLORIDE, CALCIUM CHLORIDE 600; 310; 30; 20 MG/100ML; MG/100ML; MG/100ML; MG/100ML
INJECTION, SOLUTION INTRAVENOUS CONTINUOUS
Status: CANCELLED | OUTPATIENT
Start: 2023-04-05

## 2023-04-05 RX ORDER — BNT162B2 ORIGINAL AND OMICRON BA.4/BA.5 .1125; .1125 MG/2.25ML; MG/2.25ML
INJECTION, SUSPENSION INTRAMUSCULAR
COMMUNITY
Start: 2023-01-17 | End: 2023-10-05

## 2023-04-05 NOTE — PROGRESS NOTES
[facility] Follow Up Evaluation      Referred by: No ref. provider found    PCP:     CC:   Chief Complaint   Patient presents with    Back Pain     Lower back      Last 3 PDI Scores 4/5/2023   Pain Disability Index (PDI) 28     Interval HPI 4/5/2023: Rosalia Fernández returns to the office for follow up.  She returns for follow-up in MRI review.  Today she is reporting continued low back pain, 6/10, mainly located across her left-sided lower back without radiation down either leg.  She reports some intermittent numbness over anterior thigh but is more annoying than painful.  She is been taking Tylenol for her pain with only mild relief.  She did not tolerate tramadol as it made her too loopy.  She was instructed to avoid NSAIDs due to history of cerebral hemorrhage.      HPI:   Rosalia Fernández is a 72 y.o. female who presents with back pain.  She is had chronic lower back pain for many years and has been gradually worsening.  Today she reports her back pain is primarily radiating down into the left buttock and occasionally down the outside of the left leg stopping at the knee.  She denies any numbness or weakness.  Her pain is worse with activities, walking, bending, coughing.  She is unsure of any specific thing that improves her pain as her pain is constant.      Pain Intervention History:      Past Spine Surgical History:      Past and current medications:  Antineuropathics:  NSAIDs: mobic, celebrex  Physical therapy: yes, completed   Antidepressants:  Muscle relaxers:  Opioids:  Antiplatelets/Anticoagulants:    History:    Current Outpatient Medications:     acetaminophen (TYLENOL) 500 MG tablet, Take 1,000 mg by mouth every 6 (six) hours as needed for Pain., Disp: , Rfl:     ascorbic acid (VITAMIN C) 500 MG tablet, Take 500 mg by mouth once daily., Disp: , Rfl:     CALCIUM CITRATE/VITAMIN D3 (CALCIUM CITRATE + ORAL), Take 1 tablet by mouth Daily., Disp: , Rfl:     celecoxib (CELEBREX) 200 MG  "capsule, Take 200 mg by mouth 2 (two) times daily as needed for Pain., Disp: , Rfl:     cholecalciferol, vitamin D3, (VITAMIN D3) 25 mcg (1,000 unit) capsule, Take 2,000 Units by mouth once daily., Disp: , Rfl:     coQ10, ubiquinol, 100 mg Cap, Take 1 tablet by mouth once daily., Disp: , Rfl:     krill oil 500 mg Cap, Take 2 capsules by mouth Daily., Disp: , Rfl:     LACTOBACILLUS ACIDOPHILUS (PROBIOTIC ORAL), Take by mouth., Disp: , Rfl:     LIDOcaine 4 % PtMd, Apply topically daily as needed., Disp: , Rfl:     meloxicam (MOBIC) 15 MG tablet, Take 1 tablet (15 mg total) by mouth once daily., Disp: 30 tablet, Rfl: 3    multivitamin (THERAGRAN) per tablet, Take 1 tablet by mouth once daily., Disp: , Rfl:     traMADoL (ULTRAM) 50 mg tablet, Take 1 tablet (50 mg total) by mouth every 12 (twelve) hours as needed for Pain., Disp: 14 tablet, Rfl: 0    PFIZER COVID BIVAL,12Y UP,,PF, 30 mcg/0.3 mL injection, , Disp: , Rfl:     Past Medical History:   Diagnosis Date    Colon polyps     Diverticulitis     Endometriosis     Hemorrhage     "microhemorrhage of brain"     Infertility, female     Melanoma     MVP (mitral valve prolapse)     Osteoarthritis        Past Surgical History:   Procedure Laterality Date    COLONOSCOPY  06/29/2021    Dr. Jeanette MOSQUERA  2016    Endometriosis      HERNIA REPAIR      bladder hernia    HYSTERECTOMY      [facility], with MESH TAHBSO    Melanoma Removal      OOPHORECTOMY      TOE SURGERY Left     TONSILLECTOMY         Family History   Problem Relation Age of Onset    Thyroid disease Mother     Dementia Mother     Hypertension Mother     Arthritis Father     Breast cancer Sister 68       Social History     Socioeconomic History    Marital status:      Spouse name: Mason     Number of children: 0   Occupational History    Occupation: retired   Tobacco Use    Smoking status: Never    Smokeless tobacco: Never   Substance and Sexual Activity    Alcohol use: Yes     Alcohol/week: 6.0 - 7.0 " "standard drinks     Types: 6 - 7 Glasses of wine per week     Comment: 3 or 4 a day    Drug use: No    Sexual activity: Yes     Partners: Male     Social Determinants of Health     Financial Resource Strain: Low Risk     Difficulty of Paying Living Expenses: Not hard at all   Food Insecurity: No Food Insecurity    Worried About Running Out of Food in the Last Year: Never true    Ran Out of Food in the Last Year: Never true   Transportation Needs: No Transportation Needs    Lack of Transportation (Medical): No    Lack of Transportation (Non-Medical): No   Physical Activity: Sufficiently Active    Days of Exercise per Week: 4 days    Minutes of Exercise per Session: 40 min   Stress: Stress Concern Present    Feeling of Stress : To some extent   Social Connections: Unknown    Frequency of Communication with Friends and Family: More than three times a week    Frequency of Social Gatherings with Friends and Family: More than three times a week    Active Member of Clubs or Organizations: Yes    Attends Club or Organization Meetings: 1 to 4 times per year    Marital Status:    Housing Stability: Low Risk     Unable to Pay for Housing in the Last Year: No    Number of Places Lived in the Last Year: 1    Unstable Housing in the Last Year: No       Review of patient's allergies indicates:   Allergen Reactions    Prednisone Other (See Comments)     Blurry vision, not sure if dose was too much for her.       Review of Systems:  12 point review of systems is negative.    Physical Exam:  Vitals:    04/05/23 1452   BP: (!) 148/67   Pulse: 78   Weight: 48.5 kg (106 lb 13 oz)   Height: 5' 4" (1.626 m)   PainSc:   4   PainLoc: Back     Body mass index is 18.33 kg/m².    Gen: NAD  Psych: mood appropriate for given condition  HEENT: eyes anicteric   CV: RRR, 2+ radial pulse  HEENT: anicteric   Respiratory: non-labored, no signs of respiratory distress  Abd: non-distended  Skin: warm, dry and intact.  Gait: No antalgic gait. "     Mild pain with lumbar axial facet loading, left greater than right      Sensory:  Intact and symmetrical to light touch in L1-S1 dermatomes bilaterally.    Motor:     Right Left   L2/3 Iliacus Hip flexion  5  5   L3/4 Qudratus Femoris Knee Extension  5  5   L4/5 Tib Anterior Ankle Dorsiflexion   5  5   L5/S1 Extensor Hallicus Longus Great toe extension  5  5   S1/S2 Gastroc/Soleus Plantar Flexion  5  5      Right Left                  Patellar DTR 2+ 0   Achilles DTR 0 0                      Imaging:  Xray lumbar spine 3/13/23  FINDINGS:  There is scoliosis present with convexity to the right.  This measures approximately 24° with the apex at L1-2.  No fractures are seen.  There are diffuse degenerative changes.    Xray SI joints 3/13/23  FINDINGS:  No acute displaced fracture or dislocation identified.  There are mild degenerative changes noted of the bilateral sacroiliac joints.  Sacral arcuate lines appear intact.  No radiopaque foreign body    MRI lumbar spine 03/30/2023     FINDINGS:  Morphology: Vertebral body heights are preserved.  Prominent Modic type 1 degenerative endplate changes at L2-L3 in the setting of broad rotatory dextroscoliotic curvature discussed in further detail below.  Associated small Schmorl's nodes.  Marrow signal is otherwise within normal limits..     Alignment: Broad rotatory dextroscoliotic curvature apex at the L2-L3 level.  Grade 1 anterolisthesis of L4 on L5.     Cord: Normal in contour, caliber, and signal intensity.  Conus positioning is within normal limits.     Disc levels:     T12-L1: Within normal limits.  The spinal canal and foramina are patent.  L1-L2: Mild degenerative disc height loss and bilateral facet arthrosis.  No disc herniation.  The spinal canal and foramina remain patent.  L2-L3: Severe degenerative disc height loss asymmetric to the left.  Shallow disc bulging and moderate bilateral facet arthrosis with encroachment of both subarticular recesses, left  greater than right without substantial narrowing of the spinal canal.  There is moderate left foraminal narrowing.  The right foramen is patent.  L3-L4: Mild degenerative disc height loss and desiccation with shallow disc bulging and moderate to severe facet arthrosis with ligamentum flavum thickening and small facet effusions.  Mild encroachment of both subarticular recesses.  The spinal canal remains patent.  Mild left foraminal narrowing.  The right foramen is patent.  Small facet effusions, left greater than right.  L4-L5: Severe degenerative disc height loss asymmetric to the right.  Shallow disc bulging and severe facet arthrosis with ligamentum flavum thickening and grade 1 spondylolisthesis producing mild encroachment of both subarticular recesses, right greater than left.  The spinal canal remains patent.  Mild-to-moderate right foraminal narrowing.  The left foramen is patent.  L5-S1: Mild degenerative disc height loss and moderate bilateral facet arthrosis with small facet effusions, right greater than left.  The spinal canal and subarticular recesses as well as both foramina remain patent.     Other: Visualized paraspinal soft tissues are within normal limits.     Impression:     1. Broad rotatory dextroscoliotic curvature of the lumbar spine apex at the L2-L3 level with reactive Modic type 1 endplate changes.  2. Multifactorial degenerative changes including advanced multilevel facet arthrosis but no substantial spinal canal narrowing.  Multilevel subarticular recess narrowing as discussed above.  3. Mild to moderate right L4-L5 and moderate left L2-L3 foraminal narrowing.    Labs:  Lab Results   Component Value Date    HGBA1C 5.3 03/13/2023       Lab Results   Component Value Date    WBC 5.79 06/17/2021    HGB 14.0 06/17/2021    HCT 42.6 06/17/2021    MCV 91 06/17/2021     06/17/2021           Assessment:   Problem List Items Addressed This Visit    None  Visit Diagnoses       Lumbar spondylosis     -  Primary    Relevant Orders    Ambulatory referral/consult to Physical/Occupational Therapy    Case Request Operating Room: Block-nerve-medial branch-lumbar L3/4,L4/5,L5/S1 (Completed)    Lumbar radiculitis        Scoliosis, unspecified scoliosis type, unspecified spinal region                    72 y.o. year old female with PMH history of transient neurologic symptoms, mitral valve prolapse who presents with back pain.  She is had chronic lower back pain for many years and has been gradually worsening.  Today she reports her back pain is primarily radiating down into the left buttock and occasionally down the outside of the left leg stopping at the knee.  She denies any numbness or weakness.  Her pain is worse with activities, walking, bending, coughing.  She is unsure of any specific thing that improves her pain as her pain is constant.    4/5/2023: Rosalia Fernández returns to the office for follow up.  She returns for follow-up in MRI review.  Today she is reporting continued low back pain, 6/10, mainly located across her left-sided lower back without radiation down either leg.  She reports some intermittent numbness over anterior thigh but is more annoying than painful.  She is been taking Tylenol for her pain with only mild relief.  She did not tolerate tramadol as it made her too loopy.  She was instructed to avoid NSAIDs due to history of cerebral hemorrhage.    - on exam she has full strength of her lower extremities intact sensation light touch bilateral L2-S1.  Increased pain with axial facet loading, left greater than right  - we reviewed her MRI in clinic and I I independently reviewed her lumbar MRI and is consistent with broad rotatory dextroscoliotic curvature of the lumbar spine apex at the L2-L3 level with reactive Modic type 1 endplate changes. Multifactorial degenerative changes including advanced multilevel facet arthrosis but no substantial spinal canal narrowing.  - I believe her  left-sided low back pain is originating from her multilevel facet arthropathy.  She may also have pain from her Modic type 1 changes.  - her pain is limiting her mobility interfering with her ADLs  - she is attending formal physical therapy in the past without significant relief of her pain.  - she avoid NSAIDs due to history of cerebral hemorrhage.  She is not finding relief with Tylenol.  She can not tolerate tramadol.  - while we get her set up for a procedure I have placed orders for formal physical therapy.  - for her continued left-sided pain I would like to schedule her for a left L3/4, L4/5 and L5/S1 diagnostic medial branch block.  If successful we will repeat the blocks prior to proceeding with radiofrequency ablation.  - follow-up 4 weeks post procedure or sooner if needed.  If she fails to get relief with this she may be a an intercept candidate.       : Reviewed and consistent with medication use as prescribed.      This note was completed with dictation software and grammatical errors may exist.

## 2023-04-12 ENCOUNTER — HOSPITAL ENCOUNTER (OUTPATIENT)
Dept: RADIOLOGY | Facility: HOSPITAL | Age: 73
Discharge: HOME | End: 2023-04-12
Attending: ANESTHESIOLOGY | Admitting: ANESTHESIOLOGY
Payer: MEDICARE

## 2023-04-12 ENCOUNTER — HOSPITAL ENCOUNTER (OUTPATIENT)
Facility: HOSPITAL | Age: 73
Discharge: HOME | End: 2023-04-12
Attending: ANESTHESIOLOGY | Admitting: ANESTHESIOLOGY
Payer: MEDICARE

## 2023-04-12 VITALS
HEART RATE: 74 BPM | RESPIRATION RATE: 16 BRPM | SYSTOLIC BLOOD PRESSURE: 120 MMHG | DIASTOLIC BLOOD PRESSURE: 60 MMHG | TEMPERATURE: 97 F | HEIGHT: 64 IN | BODY MASS INDEX: 18.1 KG/M2 | WEIGHT: 106 LBS | OXYGEN SATURATION: 98 %

## 2023-04-12 DIAGNOSIS — M47.816 LUMBAR SPONDYLOSIS: Primary | ICD-10-CM

## 2023-04-12 DIAGNOSIS — M54.50 LOWER BACK PAIN: ICD-10-CM

## 2023-04-12 PROCEDURE — 64493 INJ PARAVERT F JNT L/S 1 LEV: CPT | Mod: LT,,, | Performed by: ANESTHESIOLOGY

## 2023-04-12 PROCEDURE — 64494 PR INJ DX/THER AGNT PARAVERT FACET JOINT,IMG GUIDE,LUMBAR/SAC, 2ND LEVEL: ICD-10-PCS | Mod: LT,,, | Performed by: ANESTHESIOLOGY

## 2023-04-12 PROCEDURE — 64493 INJ PARAVERT F JNT L/S 1 LEV: CPT | Mod: PO,LT | Performed by: ANESTHESIOLOGY

## 2023-04-12 PROCEDURE — 64495 INJ PARAVERT F JNT L/S 3 LEV: CPT | Mod: PO,LT | Performed by: ANESTHESIOLOGY

## 2023-04-12 PROCEDURE — 63600175 PHARM REV CODE 636 W HCPCS: Mod: PO | Performed by: ANESTHESIOLOGY

## 2023-04-12 PROCEDURE — 63600175 PHARM REV CODE 636 W HCPCS: Mod: PO

## 2023-04-12 PROCEDURE — 64495 PR INJ DX/THER AGNT PARAVERT FACET JOINT,IMG GUIDE,LUMBAR/SAC, ADD LEVEL: ICD-10-PCS | Mod: LT,,, | Performed by: ANESTHESIOLOGY

## 2023-04-12 PROCEDURE — 64494 INJ PARAVERT F JNT L/S 2 LEV: CPT | Mod: LT,,, | Performed by: ANESTHESIOLOGY

## 2023-04-12 PROCEDURE — 76000 FLUOROSCOPY <1 HR PHYS/QHP: CPT | Mod: TC,PO

## 2023-04-12 PROCEDURE — 64494 INJ PARAVERT F JNT L/S 2 LEV: CPT | Mod: PO,LT | Performed by: ANESTHESIOLOGY

## 2023-04-12 PROCEDURE — 64493 PR INJ DX/THER AGNT PARAVERT FACET JOINT,IMG GUIDE,LUMBAR/SAC,1ST LVL: ICD-10-PCS | Mod: LT,,, | Performed by: ANESTHESIOLOGY

## 2023-04-12 PROCEDURE — 25000003 PHARM REV CODE 250: Mod: PO | Performed by: ANESTHESIOLOGY

## 2023-04-12 PROCEDURE — 64495 INJ PARAVERT F JNT L/S 3 LEV: CPT | Mod: LT,,, | Performed by: ANESTHESIOLOGY

## 2023-04-12 RX ORDER — BUPIVACAINE HYDROCHLORIDE 2.5 MG/ML
INJECTION, SOLUTION EPIDURAL; INFILTRATION; INTRACAUDAL
Status: DISCONTINUED | OUTPATIENT
Start: 2023-04-12 | End: 2023-04-12 | Stop reason: HOSPADM

## 2023-04-12 RX ORDER — MIDAZOLAM HYDROCHLORIDE 1 MG/ML
INJECTION INTRAMUSCULAR; INTRAVENOUS
Status: DISCONTINUED | OUTPATIENT
Start: 2023-04-12 | End: 2023-04-12 | Stop reason: HOSPADM

## 2023-04-12 RX ORDER — LIDOCAINE HYDROCHLORIDE 10 MG/ML
INJECTION, SOLUTION EPIDURAL; INFILTRATION; INTRACAUDAL; PERINEURAL
Status: DISCONTINUED | OUTPATIENT
Start: 2023-04-12 | End: 2023-04-12 | Stop reason: HOSPADM

## 2023-04-12 RX ORDER — SODIUM CHLORIDE, SODIUM LACTATE, POTASSIUM CHLORIDE, CALCIUM CHLORIDE 600; 310; 30; 20 MG/100ML; MG/100ML; MG/100ML; MG/100ML
INJECTION, SOLUTION INTRAVENOUS CONTINUOUS
Status: DISCONTINUED | OUTPATIENT
Start: 2023-04-12 | End: 2023-04-12 | Stop reason: HOSPADM

## 2023-04-12 RX ADMIN — SODIUM CHLORIDE, POTASSIUM CHLORIDE, SODIUM LACTATE AND CALCIUM CHLORIDE: 600; 310; 30; 20 INJECTION, SOLUTION INTRAVENOUS at 11:04

## 2023-04-12 NOTE — INTERVAL H&P NOTE
The patient has been examined and the H&P has been reviewed:    I concur with the findings and no changes have occurred since H&P was written.  The risks and benefits of this intervention, and alternative therapies were discussed with the patient.  The discussion of risks included infection, bleeding, need for additional procedures or surgery, nerve damage.  Questions regarding the procedure, risks, expected outcome, and possible side effects were solicited and answered to the patient's satisfaction.  Rosalia Fernández wishes to proceed with the injection or procedure.  Written consent was obtained.      There are no hospital problems to display for this patient.

## 2023-04-12 NOTE — OP NOTE
Procedure Note    Procedure Date: 4/12/2023    Procedure Performed:  left Diagnostic Lumbar Medial Branch @ L3/4, L4/5, L5/S1 with Fluoroscopic Guidance    Indications:   Rosalia Fernández has chronic, moderate to severe axial lower back pain present for over the past 3 months that has failed to respond to physical therapy and PT-directed home exercises. There is no untreated radiculopathy or claudication present.  The patient has clinical and radiologic findings suggestive of facet mediated pain.     Pre-op diagnosis: Lumbar Spondylosis    Post-op diagnosis: same    Physician: Chase Cody MD    IV Sedation medications: Moderate sedation was achieved with midazolam 2 mg .  Continuous monitoring of EKG, blood pressure and pulse oximetry was provided by a registered nurse during the entire course of the procedure under my supervision and recorded in the patient's medical record.   Total time for sedation was 11 minutes.    Medications injected: 8 ml Bupivacaine 0.25%    Local anesthetic used: 1% Lidocaine, 3 ml    Estimated Blood Loss: none    Complications:  none    Technique:  The patient was interviewed in the holding area and Risks/Benefits were discussed, including, but not limited to, the possibility of new or different pain, bleeding or infection.   All questions were answered.  The patient understood and accepted risks.  Consent was reviewed.  A time out was taken to identify the patient, procedure and side of the procedure. The patient was placed in a prone position, then prepped and draped in the usual sterile fashion using ChloraPrep x2 and sterile towels.  The levels were determined under fluoroscopic guidance and then marked.  1% Lidocaine was given by raising a wheal at the skin over each site and then infiltrated approximately 2cm deeper.  A 25-gauge 3.5 inch needle was introduced to the anatomic location of the L2-5 medial branch nerves on the left side.  Then, after negative aspiration, 1.5 cc of 8  ml Bupivacaine 0.25% was injected @ each level.  The patient tolerated the procedure well.  The patient tolerated the procedure well and was transferred to the [facility] in stable condition.  The patient was monitored after the procedure.  Patient was given post procedure and discharge instructions to follow at home.  The patient was discharged in a stable condition.    Event Time In   In Facility 1114   In Pre-Procedure 1131   Physician Available    Anesthesia Available    Pre-Op: Bedside Procedure Start    Pre-Op: Bedside Procedure Stop    Pre-Procedure Complete 1155   Out of Pre-Procedure    Anesthesia Start    Anesthesia Start Data Collection    Setup Start    Setup Complete    In Room 1304   Prep Start    Procedure Prep Complete    Procedure Start 1310   Procedure Closing    Emergence    Procedure Finish 1314   Sedation Start 1303   Scope In    Extent Reached    Scope Out    Sedation End 1314   Out of Room 1315   Cleanup Start    Cleanup Complete    Cosmetic Start    Cosmetic Stop    Pain Mgmt In Room    Pain Mgmt Out Room    In Recovery    Anesthesia Finish    Bedside Procedure Start    Bedside Procedure Stop    Recovery Care Complete    Out of Recovery    To Phase II    In Phase II    Pain Mgmt Recovery Start    Pain Mgmt Recovery Stop    Obs Rec Start    Obs Rec Stop    Phase II Care Complete    Out of Phase II    Procedural Care Complete    Discharge    Pain Follow Up Needed    Pain Follow Up Complete

## 2023-04-12 NOTE — DISCHARGE SUMMARY
[facility]  Discharge Note  Short Stay    Admit Date: 4/12/2023    Discharge Date: 4/12/2023    Attending Physician: Chase Cody     Discharge Provider: Chase Cody    Diagnoses:  There are no hospital problems to display for this patient.      Discharged Condition: Good    Final Diagnoses: Lumbar spondylosis [M47.816]    Disposition: Home or Self Care    Hospital Course: No complications, uneventful    Outcome of Hospitalization, Treatment, Procedure, or Surgery:  Patient was admitted for outpatient interventional pain management procedure. The patient tolerated the procedure well with no complications.    Follow up/Patient Instructions:  Follow up as scheduled in Pain Management office in 2-3 weeks.  Patient has received instructions and follow up date and time.    Medications:  Continue previous medications    Discharge Procedure Orders   Notify your health care provider if you experience any of the following:  temperature >100.4     Notify your health care provider if you experience any of the following:  persistent nausea and vomiting or diarrhea     Notify your health care provider if you experience any of the following:  severe uncontrolled pain     Notify your health care provider if you experience any of the following:  redness, tenderness, or signs of infection (pain, swelling, redness, odor or green/yellow discharge around incision site)     Notify your health care provider if you experience any of the following:  difficulty breathing or increased cough     Notify your health care provider if you experience any of the following:  severe persistent headache     Notify your health care provider if you experience any of the following:  worsening rash     Notify your health care provider if you experience any of the following:  persistent dizziness, light-headedness, or visual disturbances     Notify your health care provider if you experience any of the following:  increased confusion or  weakness     Activity as tolerated

## 2023-04-21 ENCOUNTER — TELEPHONE (OUTPATIENT)
Dept: PAIN MEDICINE | Facility: CLINIC | Age: 73
End: 2023-04-21
Payer: MEDICARE

## 2023-04-21 ENCOUNTER — CLINICAL SUPPORT (OUTPATIENT)
Dept: REHABILITATION | Facility: HOSPITAL | Age: 73
End: 2023-04-21
Payer: MEDICARE

## 2023-04-21 DIAGNOSIS — M54.50 CHRONIC LEFT-SIDED LOW BACK PAIN WITHOUT SCIATICA: ICD-10-CM

## 2023-04-21 DIAGNOSIS — M47.816 LUMBAR SPONDYLOSIS: ICD-10-CM

## 2023-04-21 DIAGNOSIS — M54.16 LUMBAR RADICULITIS: ICD-10-CM

## 2023-04-21 DIAGNOSIS — M47.816 LUMBAR SPONDYLOSIS: Primary | ICD-10-CM

## 2023-04-21 DIAGNOSIS — G89.29 CHRONIC LEFT-SIDED LOW BACK PAIN WITHOUT SCIATICA: ICD-10-CM

## 2023-04-21 PROCEDURE — 97161 PT EVAL LOW COMPLEX 20 MIN: CPT | Mod: PO

## 2023-04-21 NOTE — TELEPHONE ENCOUNTER
----- Message from Lidia Meneses, PT sent at 4/21/2023 11:48 AM CDT -----  Regarding: Healthy Back Referral  Landen Cavazos,    I evaluated Rosalia Peraza today. I think she may be a good candidate for the healthy back program to improve her strengthening. Can you send over a referral for that?    Thank you,    Lidia Meneses, PT, DPT

## 2023-04-21 NOTE — PLAN OF CARE
[facility]   Physical Therapy Initial Evaluation     Date: 4/21/2023   Name: Rosalia Peraza Califon  Clinic Number: [id number]    Therapy Diagnosis:   Encounter Diagnosis   Name Primary?    Lumbar spondylosis      Physician: Dirk Alvarado PA-C    Physician Orders: PT Eval and Treat   Medical Diagnosis from Referral: M47.816 (ICD-10-CM) - Lumbar spondylosis  Evaluation Date: 4/21/2023  Authorization Period Expiration: 12/31/23  Plan of Care Expiration: 6/21/23  Progress Note Due: 5/21/23  Visit # / Visits authorized: 1/ 1   FOTO: Issued Visit #: Next visit        Precautions: Standard     Time In: 1103  Time Out: 1142  Total Appointment Time (timed & untimed codes): 39 minutes      SUBJECTIVE   Date of onset: years ago    History of current condition - Rosalia Peraza reports: that she is having left sided low back pain. She started having pain a few years ago. She states that she had an injection in her spine a couple weeks ago. She states that the pain gets worse as the day progresses. Sometimes she feels quick jabs when she gets up in the middle of the night to use the bathroom. She does not take pain pills because they make her too loopy. She takes tylenol every 6 hours if needed. She was using biofreeze and lidocaine patches. She states that sweeping the floor and bending over are the most painful. She states that she never has no pain.She states that she had a nerve block last year, and it produced a reduction in pain to 50-60% pain. The pain returned the next day. She walks for exercise at least 5 days an exercise. She plays golf, but she gets pain with that at times.    Falls: no    Imaging, MRI studies:   Impression:  1. Broad rotatory dextroscoliotic curvature of the lumbar spine apex at the L2-L3 level with reactive Modic type 1 endplate changes.  2. Multifactorial degenerative changes including advanced multilevel facet arthrosis but no substantial spinal canal narrowing.   "Multilevel subarticular recess narrowing as discussed above.  3. Mild to moderate right L4-L5 and moderate left L2-L3 foraminal narrowing.    Prior Therapy: yes for the back pain at [facility] physical therapy  Social History:  lives with their spouse  Occupation: retired  Prior Level of Function: limited by back pain for years  Current Level of Function: pain limits her activities    Pain:  Current 4/10, worst 7/10, best 0/10   Location: back - lumbar  Description: Aching and Throbbing  Aggravating Factors: Standing, Walking, and chores  Easing Factors: pain medication, lying down, and rest    Patients goals: have no pain     Medical History:   Past Medical History:   Diagnosis Date    Colon polyps     Diverticulitis     Endometriosis     Hemorrhage     "microhemorrhage of brain"     Infertility, female     Melanoma     MVP (mitral valve prolapse)     Osteoarthritis        Surgical History:   Rosalia Fernández  has a past surgical history that includes Hysterectomy; Tonsillectomy; Melanoma Removal; Hernia repair; Endometriosis; Colonoscopy (06/29/2021); DEXA (2016); Oophorectomy; Toe Surgery (Left); and Injection of anesthetic agent around medial branch nerves innervating lumbar facet joint (Left, 4/12/2023).  ty  Medications:   Rosalia has a current medication list which includes the following prescription(s): acetaminophen, ascorbic acid (vitamin c), calcium citrate/vitamin d3, celecoxib, cholecalciferol (vitamin d3), coq10 (ubiquinol), krill oil, lactobacillus acidophilus, lidocaine, meloxicam, multivitamin, pfizer covid bival(12y up)(pf), and tramadol.    Allergies:   Review of patient's allergies indicates:   Allergen Reactions    Prednisone Other (See Comments)     Blurry vision, not sure if dose was too much for her.          OBJECTIVE     Repeated Motion Testing:    Symptoms Response   Repeated Flexion  no change   Repeated Extension  increase   Left Sidebending  increase   Right Sidebending  no change "         Myotomes:    Left  Right   L2  4  4   L3  4-  4   L4  4-  4   L5  4  4   S1  3  4   S2  4  4     Dermatomes:  All WNL     Strength:    Left  Right   Hip Abduction   3+  3+   Hip Extension  3  3   Upper Abdominals  2   Lower Abdominals  2     Special Tests:    Left  Right    Sign of Buttock  negative negative   ADAN Positive posterior pain negative   SLR negative negative   FADIR negative negative   Scour negative negative   Femoral Compression negative negative   Log Roll negative negative   Femoral Nerve Tension Test negative negative     Thoracic Spine:  Mild kyphosis    Mild tenderness to palpation left paraspinals       Limitation/Restriction for FOTO NV Survey    Therapist reviewed FOTO scores for Rosalia Fernández on 4/21/2023.   FOTO documents entered into [employer] - see Media section.    Limitation Score: NV%         TREATMENT     Plan for Next Visit:  Refer to healthy back, perform strengthening if healthy back referral is denied     Total Treatment time (time-based codes) separate from Evaluation: 00 minutes       PATIENT EDUCATION AND HOME EXERCISES     Education provided:   - benefits of the healthy back program    Written Home Exercises Provided:  no . Exercises were reviewed and Rosalia Peraza was able to demonstrate them prior to the end of the session.  Rosalia Peraza demonstrated fair  understanding of the education provided. See EMR under Patient Instructions for exercises provided during therapy sessions.    ASSESSMENT     Rosalia is a 72 y.o. female referred to outpatient Physical Therapy with a medical diagnosis of lumbar spondylosis. Patient presents with significant weakness globally. Her pain is chronic in nature and appears to be muscular rather than neural. Patient appears to be a good candidate for the healthy back program due chronicity of pain and history of failure of traditional physical therapy. If she is unable to transition to healthy back, addressing current strength deficits may  be beneficial. If patient does not transition to healthy back, interventions, including manual therapy, therapeutic exercise, therapeutic activities, gait training, and neuromuscular re-education will be utilized to address these impairments. Physical therapy intervention is medically necessary to optimize functional outcomes.      Patient prognosis is Fair.   Patientt will benefit from skilled outpatient Physical Therapy to address the deficits stated above and in the chart below, provide patient /family education, and to maximize patientt's level of independence.     Plan of care discussed with patient: Yes  Patient's spiritual, cultural and educational needs considered and patient is agreeable to the plan of care and goals as stated below:     Anticipated Barriers for therapy: none    Medical Necessity is demonstrated by the following  History  Co-morbidities and personal factors that may impact the plan of care Co-morbidities:   See medical history    Personal Factors:   no deficits     moderate   Examination  Body Structures and Functions, activity limitations and participation restrictions that may impact the plan of care Body Regions:   back    Body Systems:    ROM  strength  motor control    Participation Restrictions:   none    Activity limitations:   Learning and applying knowledge  no deficits    General Tasks and Commands  no deficits    Communication  no deficits    Mobility  lifting and carrying objects  walking    Self care  washing oneself (bathing, drying, washing hands)  caring for body parts (brushing teeth, shaving, grooming)  dressing    Domestic Life  shopping  cooking  doing house work (cleaning house, washing dishes, laundry)    Interactions/Relationships  no deficits    Life Areas  no deficits    Community and Social Life  recreation and leisure         moderate   Clinical Presentation stable and uncomplicated low   Decision Making/ Complexity Score: low     Goals:    Long Term Goals: 2  weeks   Patient will transition to healthy back program.    PLAN   Plan of care Certification: 4/21/2023 to 6/21/23.      Outpatient Physical Therapy 2 times weekly for 4 weeks to include the following interventions: Neuromuscular Re-ed, Patient Education, Self Care, Therapeutic Activities, and Therapeutic Exercise if patient does not transition to healthy back..     Lidia Meneses, PT      I CERTIFY THE NEED FOR THESE SERVICES FURNISHED UNDER THIS PLAN OF TREATMENT AND WHILE UNDER MY CARE   Physician's comments:     Physician's Signature: ___________________________________________________

## 2023-04-24 ENCOUNTER — TELEPHONE (OUTPATIENT)
Dept: OTOLARYNGOLOGY | Facility: CLINIC | Age: 73
End: 2023-04-24
Payer: MEDICARE

## 2023-04-24 NOTE — TELEPHONE ENCOUNTER
----- Message from Casie Asrhaf sent at 4/24/2023  9:23 AM CDT -----  Regarding: sooner appt/call back  Name of Caller: ROHAN TERESA FUNG [[id number]      When is the first available appointment? June      Symptoms: dizziness, ear       Best Call Back Number: [phone]        Additional Information: Pt would like a sooner appt or a call back because of dizziness and concerns about her ear. Please advise.

## 2023-04-28 ENCOUNTER — OFFICE VISIT (OUTPATIENT)
Dept: OTOLARYNGOLOGY | Facility: CLINIC | Age: 73
End: 2023-04-28
Payer: MEDICARE

## 2023-04-28 VITALS
BODY MASS INDEX: 18.56 KG/M2 | DIASTOLIC BLOOD PRESSURE: 66 MMHG | WEIGHT: 108.69 LBS | HEIGHT: 64 IN | SYSTOLIC BLOOD PRESSURE: 128 MMHG

## 2023-04-28 DIAGNOSIS — H81.11 BENIGN PAROXYSMAL POSITIONAL VERTIGO, RIGHT: Primary | ICD-10-CM

## 2023-04-28 PROCEDURE — 3008F PR BODY MASS INDEX (BMI) DOCUMENTED: ICD-10-PCS | Mod: CPTII,S$GLB,, | Performed by: NURSE PRACTITIONER

## 2023-04-28 PROCEDURE — 1101F PR PT FALLS ASSESS DOC 0-1 FALLS W/OUT INJ PAST YR: ICD-10-PCS | Mod: CPTII,S$GLB,, | Performed by: NURSE PRACTITIONER

## 2023-04-28 PROCEDURE — 99213 PR OFFICE/OUTPT VISIT, EST, LEVL III, 20-29 MIN: ICD-10-PCS | Mod: 25,S$GLB,, | Performed by: NURSE PRACTITIONER

## 2023-04-28 PROCEDURE — 1126F PR PAIN SEVERITY QUANTIFIED, NO PAIN PRESENT: ICD-10-PCS | Mod: CPTII,S$GLB,, | Performed by: NURSE PRACTITIONER

## 2023-04-28 PROCEDURE — 95992 CANALITH REPOSITIONING PROC: CPT | Mod: S$GLB,,, | Performed by: NURSE PRACTITIONER

## 2023-04-28 PROCEDURE — 3074F PR MOST RECENT SYSTOLIC BLOOD PRESSURE < 130 MM HG: ICD-10-PCS | Mod: CPTII,S$GLB,, | Performed by: NURSE PRACTITIONER

## 2023-04-28 PROCEDURE — 3008F BODY MASS INDEX DOCD: CPT | Mod: CPTII,S$GLB,, | Performed by: NURSE PRACTITIONER

## 2023-04-28 PROCEDURE — 1159F PR MEDICATION LIST DOCUMENTED IN MEDICAL RECORD: ICD-10-PCS | Mod: CPTII,S$GLB,, | Performed by: NURSE PRACTITIONER

## 2023-04-28 PROCEDURE — 1160F PR REVIEW ALL MEDS BY PRESCRIBER/CLIN PHARMACIST DOCUMENTED: ICD-10-PCS | Mod: CPTII,S$GLB,, | Performed by: NURSE PRACTITIONER

## 2023-04-28 PROCEDURE — 3078F DIAST BP <80 MM HG: CPT | Mod: CPTII,S$GLB,, | Performed by: NURSE PRACTITIONER

## 2023-04-28 PROCEDURE — 1101F PT FALLS ASSESS-DOCD LE1/YR: CPT | Mod: CPTII,S$GLB,, | Performed by: NURSE PRACTITIONER

## 2023-04-28 PROCEDURE — 1126F AMNT PAIN NOTED NONE PRSNT: CPT | Mod: CPTII,S$GLB,, | Performed by: NURSE PRACTITIONER

## 2023-04-28 PROCEDURE — 3044F HG A1C LEVEL LT 7.0%: CPT | Mod: CPTII,S$GLB,, | Performed by: NURSE PRACTITIONER

## 2023-04-28 PROCEDURE — 1160F RVW MEDS BY RX/DR IN RCRD: CPT | Mod: CPTII,S$GLB,, | Performed by: NURSE PRACTITIONER

## 2023-04-28 PROCEDURE — 3074F SYST BP LT 130 MM HG: CPT | Mod: CPTII,S$GLB,, | Performed by: NURSE PRACTITIONER

## 2023-04-28 PROCEDURE — 3078F PR MOST RECENT DIASTOLIC BLOOD PRESSURE < 80 MM HG: ICD-10-PCS | Mod: CPTII,S$GLB,, | Performed by: NURSE PRACTITIONER

## 2023-04-28 PROCEDURE — 3044F PR MOST RECENT HEMOGLOBIN A1C LEVEL <7.0%: ICD-10-PCS | Mod: CPTII,S$GLB,, | Performed by: NURSE PRACTITIONER

## 2023-04-28 PROCEDURE — 99999 PR PBB SHADOW E&M-EST. PATIENT-LVL IV: ICD-10-PCS | Mod: PBBFAC,,, | Performed by: NURSE PRACTITIONER

## 2023-04-28 PROCEDURE — 1159F MED LIST DOCD IN RCRD: CPT | Mod: CPTII,S$GLB,, | Performed by: NURSE PRACTITIONER

## 2023-04-28 PROCEDURE — 99999 PR PBB SHADOW E&M-EST. PATIENT-LVL IV: CPT | Mod: PBBFAC,,, | Performed by: NURSE PRACTITIONER

## 2023-04-28 PROCEDURE — 99213 OFFICE O/P EST LOW 20 MIN: CPT | Mod: 25,S$GLB,, | Performed by: NURSE PRACTITIONER

## 2023-04-28 PROCEDURE — 3288F FALL RISK ASSESSMENT DOCD: CPT | Mod: CPTII,S$GLB,, | Performed by: NURSE PRACTITIONER

## 2023-04-28 PROCEDURE — 95992 PR CANALITH REPOSITIONING PROCEDURE, PER DAY: ICD-10-PCS | Mod: S$GLB,,, | Performed by: NURSE PRACTITIONER

## 2023-04-28 PROCEDURE — 3288F PR FALLS RISK ASSESSMENT DOCUMENTED: ICD-10-PCS | Mod: CPTII,S$GLB,, | Performed by: NURSE PRACTITIONER

## 2023-04-28 RX ORDER — COVID-19 ANTIGEN TEST
KIT MISCELLANEOUS
COMMUNITY
Start: 2022-12-20 | End: 2023-10-05

## 2023-04-28 NOTE — PROGRESS NOTES
Subjective     Patient ID: Rosalia Fernández is a 72 y.o. female.    Chief Complaint: Dizziness    HPI  Patient treated here on 07/16/2021 for right-sided BPPV. Patient returns today for dizziness. Patient reports brief momentary vertigo with looking up, laying down, or getting up. Only lasts a couple of seconds. Started back about a week or two ago.   Audiogram 9 months ago revealed relatively symmetric sloping SNHL.     Review of Systems   Constitutional: Negative.    HENT: Negative.     Eyes: Negative.    Respiratory: Negative.     Cardiovascular: Negative.    Gastrointestinal: Negative.    Musculoskeletal: Negative.    Integumentary:  Negative.   Neurological:  Positive for dizziness.   Hematological: Negative.    Psychiatric/Behavioral: Negative.        Objective     Physical Exam  Vitals and nursing note reviewed.   Constitutional:       General: She is not in acute distress.     Appearance: She is well-developed. She is not ill-appearing.   HENT:      Head: Normocephalic and atraumatic.      Right Ear: Hearing, tympanic membrane, ear canal and external ear normal. No middle ear effusion. Tympanic membrane is not erythematous.      Left Ear: Hearing, tympanic membrane, ear canal and external ear normal.  No middle ear effusion. Tympanic membrane is not erythematous.      Nose: Nose normal.   Eyes:      General: Lids are normal. No scleral icterus.        Right eye: No discharge.         Left eye: No discharge.   Neck:      Trachea: Trachea normal. No tracheal deviation.   Cardiovascular:      Rate and Rhythm: Normal rate.   Pulmonary:      Effort: Pulmonary effort is normal. No respiratory distress.      Breath sounds: No stridor. No wheezing.   Musculoskeletal:         General: Normal range of motion.      Cervical back: Normal range of motion and neck supple.   Skin:     General: Skin is warm and dry.   Neurological:      Mental Status: She is alert and oriented to person, place, and time.       Coordination: Coordination normal.      Gait: Gait normal.   Psychiatric:         Attention and Perception: Attention normal.         Mood and Affect: Mood normal.         Speech: Speech normal.         Behavior: Behavior normal. Behavior is cooperative.      Negative [place]-Hallpike HHL  Positive Rakesh-Hallpike HHR    Assessment and Plan     Problem List Items Addressed This Visit    None  Visit Diagnoses       Benign paroxysmal positional vertigo, right    -  Primary        Canalith repositioning maneuvers done by me to correct patient's BPPV Right   Post procedure instructions to prevent recurrence of BPPV were given in writing and reviewed in detail by me  Follow up as needed with ENT, audiologist, or PT to ensure full resolution.

## 2023-05-03 ENCOUNTER — TELEPHONE (OUTPATIENT)
Dept: PAIN MEDICINE | Facility: CLINIC | Age: 73
End: 2023-05-03
Payer: MEDICARE

## 2023-05-03 NOTE — TELEPHONE ENCOUNTER
----- Message from Arielle Neumann sent at 5/3/2023  9:48 AM CDT -----  Contact: Patient  Type:  Needs Medical Advice    Who Called:   Patient    Would the patient rather a call back or a response via [facility]?   Call back  Best Call Back Number:   [phone]    Additional Information:   States she has been taking acetaminophen (TYLENOL) 500 MG tablet - states it is not agreeing with her and states she would like to speak with someone about a different medication - states she feels like she has been taking it for too long - please call to advise - thank you

## 2023-05-03 NOTE — TELEPHONE ENCOUNTER
This patient is s/p 1st diagnostic lumbar medial branch blocks on 4/12/23 and does not have any documentation of follow up post block.

## 2023-05-18 ENCOUNTER — CLINICAL SUPPORT (OUTPATIENT)
Dept: REHABILITATION | Facility: HOSPITAL | Age: 73
End: 2023-05-18
Payer: MEDICARE

## 2023-05-18 DIAGNOSIS — R29.898 DECREASED STRENGTH OF TRUNK AND BACK: ICD-10-CM

## 2023-05-18 DIAGNOSIS — G89.29 CHRONIC LEFT-SIDED LOW BACK PAIN WITHOUT SCIATICA: Primary | ICD-10-CM

## 2023-05-18 DIAGNOSIS — M54.16 LUMBAR RADICULITIS: ICD-10-CM

## 2023-05-18 DIAGNOSIS — M54.50 CHRONIC LEFT-SIDED LOW BACK PAIN WITHOUT SCIATICA: Primary | ICD-10-CM

## 2023-05-18 DIAGNOSIS — M47.816 LUMBAR SPONDYLOSIS: ICD-10-CM

## 2023-05-18 PROCEDURE — 97530 THERAPEUTIC ACTIVITIES: CPT | Mod: PO | Performed by: PHYSICAL THERAPIST

## 2023-05-18 PROCEDURE — 97112 NEUROMUSCULAR REEDUCATION: CPT | Mod: PO | Performed by: PHYSICAL THERAPIST

## 2023-05-18 PROCEDURE — 97110 THERAPEUTIC EXERCISES: CPT | Mod: PO | Performed by: PHYSICAL THERAPIST

## 2023-05-18 NOTE — PLAN OF CARE
[facility]  Physical Therapy Lumbar Evaluation      Name: Rosalia Peraza Murphy  Clinic Number: [id number]    Therapy Diagnosis:   Encounter Diagnoses   Name Primary?    Lumbar spondylosis     Lumbar radiculitis     Decreased strength of trunk and back     Chronic left-sided low back pain without sciatica Yes     Physician: Dirk Alvarado PA-C    Physician Orders: PT Eval and Treat Healthy Back  Medical Diagnosis from Referral: lumbar spondylosis, lumbar radiculitis  Evaluation Date: 5/18/2023  Authorization Period Expiration: 4/20/24  Plan of Care Expiration: 7/28/2023  Reassessment Due: 6/19/2023  Visit # / Visits authorized: 1/1    Time In: 12:15PM  Time Out: 1:50PM  Total Billable Time: 95 minutes  INSURANCE and OUTCOMES: Value Based Insurance with FOTO Outcomes 1/3    Precautions: standard    Pattern of pain determined: Pattern 1 PEP    Subjective     Date of onset: 2 years ago  History of current condition - Rosalia Peraza reports: that she is having left sided low back pain. She started having pain a few years ago. She states that she had an injection in her spine a couple weeks ago. She states that the pain gets worse as the day progresses. Sometimes she feels quick jabs when she gets up in the middle of the night to use the bathroom. She does not take pain pills because they make her too loopy. She takes tylenol every 6 hours if needed. She was using biofreeze and lidocaine patches. She states that sweeping the floor and bending over are the most painful. She reports pain with getting out of bed and with rising from sitting.She states that she never has no pain.She states that she had a nerve block last year, and it produced a reduction in pain to 50-60% pain. The pain returned the next day. She walks for exercise at least 5 days/week for exercise. She plays golf, but she gets pain with that at times. Physical therapy in past helped. She states she has continued  "exercises taught in PT. Pain has never completely resolved. She is unsure if she feels better after doing her stretches.    Medical History:   Past Medical History:   Diagnosis Date    Colon polyps     Diverticulitis     Endometriosis     Hemorrhage     "microhemorrhage of brain"     Infertility, female     Melanoma     MVP (mitral valve prolapse)     Osteoarthritis        Surgical History:   Rosalia Fernández  has a past surgical history that includes Hysterectomy; Tonsillectomy; Melanoma Removal; Hernia repair; Endometriosis; Colonoscopy (06/29/2021); DEXA (2016); Oophorectomy; Toe Surgery (Left); and Injection of anesthetic agent around medial branch nerves innervating lumbar facet joint (Left, 4/12/2023).    Medications:   Rosalia has a current medication list which includes the following prescription(s): acetaminophen, ascorbic acid (vitamin c), calcium citrate/vitamin d3, celecoxib, cholecalciferol (vitamin d3), coq10 (ubiquinol), krill oil, lactobacillus acidophilus, lidocaine, meclizine, meloxicam, multivitamin, pfizer covid bival(12y up)(pf), quickvue at-home covid-19 test, and tramadol.    Allergies:   Review of patient's allergies indicates:   Allergen Reactions    Prednisone Other (See Comments)     Blurry vision, not sure if dose was too much for her.        Imaging: MRI  lumbar 3/30/23  1. Broad rotatory dextroscoliotic curvature of the lumbar spine apex at the L2-L3 level with reactive Modic type 1 endplate changes.  2. Multifactorial degenerative changes including advanced multilevel facet arthrosis but no substantial spinal canal narrowing.  Multilevel subarticular recess narrowing as discussed above.  3. Mild to moderate right L4-L5 and moderate left L2-L3 foraminal narrowing.  Xray lumbar 3/13/23:  There is scoliosis present with convexity to the right. This measures approximately 24° with the apex at L1-2. No fractures are seen. There are diffuse degenerative changes.   Xray SI 3/13/23:  . No acute " osseous abnormality.  2. Mild degenerative changes noted of the bilateral sacroiliac joints.    Prior Therapy: 2 years ago  Prior Treatment: biofreeze, pain patches, voltaren  Social History:   lives with their spouse  Occupation: retired  Leisure: golf, gardening, but not doing because of back      Prior Level of Function: play golf, garden, household chores without pain  Current Level of Function: she no longer does much gardening because of pain, she continues with other activities with pain at times  DME owned/used: no  Gym Membership: no    Pain:  Current 3/10, worst 8/10, best 2/10   Location: left back , left lat thigh occasionally  Description: Aching, Dull, and Shooting  Aggravating Factors: Bending, Getting out of bed/chair, and sweeping  Easing Factors:  stretches  sometimes  Disturbed Sleep: yes    Pattern of pain questions:  1.  Where is your pain the worst? Left lumbar   2.  Is your pain constant or intermittent? constant  3.  Does bending forward make your typical pain worse? yes  4.  Since the start of your back pain, has there been a change in your bowel or bladder? no  5.  What can't you do now that you use to be able to do? Garden like she would like, sometimes have to quit golf before 18 holes    Pts goals: no pain    Red Flag Screening:   Cough/Sneeze Strain: (--)  Bladder/Bowel: (--)  Falls: (--)  Night pain: (--)  Unexplained weight loss: (--)  General health: arthritis in hands    Objective      Postural examination/scapula alignment: Rounded shoulder, Head forward, Increased kyphosis, Decreased lordosis, and left pelvic crest elevated, right rib hump  Joint integrity: WNL  Skin integrity:WNL   Edema: None  Sitting: flexed  Standing: as above  Correction of posture: better with lumbar roll- slimline    MOVEMENT LOSS - Lumbar   Norms ROM Loss Initial   Flexion Fingers touch toes, sacral angle >/= 70 deg, uniform spinal curvature, posterior weight shift  within functional limits   Extension  ASIS surpasses toes, spine of scapulae surpasses heels, uniform spinal curve moderate loss   Side glide Right  minimal loss   Side glide Left  minimal loss   Rotation Right PT observes contralateral shoulder minimal loss   Rotation Left PT observes contralateral shoulder minimal loss     Lower Extremity Strength  Right LE  Left LE    Hip flexion: 4-/5 Hip flexion: 4-/5   Hip extension:  4-/5 Hip extension: 4-/5   Hip abduction: 4-/5 Hip abduction: 4-/5   Hip adduction:  5/5 Hip adduction:  5/5   Hip External Rotation 4/5 Hip External Rotation 4/5   Hip Internal rotation   4/5 Hip Internal rotation 4/5   Knee Flexion 4/5 Knee Flexion 4/5   Knee Extension 4/5 Knee Extension 4-/5   Ankle dorsiflexion: 4/5 Ankle dorsiflexion: 4/5   Ankle plantarflexion: 4/5 Ankle plantarflexion: 4/5     GAIT:  Assistive Device used: none  Level of Assistance: independent  Patient displays the following gait deviations: no gait deviations observed.     Special Tests:   Test Name  Test Result   Prone Instability Test (--)   SI Joint Provocation Test (--)   Straight Leg Raise (--)   Neural Tension Test (--)   Crossed Straight Leg Raise (--)   Walking on toes Able   Walking on heels  Able     NEUROLOGICAL SCREENING:     Sensory deficits: none    Reflexes:    Left Right   Patella Tendon 2+ 2+   Achilles Tendon 2+ 2+   Babinski  (--) (--)   Clonus (--) (--)     REPEATED TEST MOVEMENTS:    Baseline symptoms:  Repeated Flexion in Standing worse   Repeated Extension in Standing better   Repeated Flexion in lying no effect   Repeated Extension in lying  better       STATIC TESTS and other movements:   Prone lie better   Prone lie on elbows better   Sustained flexion no effect   Sitting slouched  worse   Sitting erect better   Long sitting   worse       Baseline Isometric Testing on Med X equipment: Testing administered by PT    Date of testin23  ROM 0-54 deg   Max Peak Torque 48    Min Peak Torque 18    Flex/Ext Ratio 2.6:1   % below  normative data 63         Limitation/Restriction for FOTO lumbar Survey    Therapist reviewed FOTO scores for Rosalia Fernández on 5/18/2023.   FOTO documents entered into [employer] - see Media section.    Limitation Score: 22%  Visit 5 Score:   Visit 10 Score:   Discharge Score:   Goal Score: 18%       Treatment     Treatment Time In: 12:35  Treatment Time Out: 1:50PM  Total Treatment time separate from Evaluation: 75 minutes    Rosalia received therapeutic exercises to develop/improve posture, lumbar ROM, strength, and muscular endurance for 15 minutes including the following exercises:     Prone on elbows 2 min  Press ups 2/10  Bridging 2/10  Standing extension x10    Written Home Exercises Provided: yes.    HEP AS FOLLOWS:  Prone elbows  Press ups   Bridging  Standing extension    Exercises were reviewed and Rosalia Peraza was able to demonstrate them prior to the end of the session.  Rosalia Peraza demonstrated good  understanding of the education provided.     See EMR under Patient Instructions for exercises provided 5/18/2023.    Rosalia received neuromuscular education to engage spinal musculature correctly for motor control and engagement of musculature for 30 minutes including the MedX exercise component and practice and standard testing. MedX dynamic exercise and baseline isometric test performed with instructions to guide the patient safely through the testing procedure. Patient instructed to perform isometric test correctly and safely while building to an optimal force with a pain-free effort. Patient also instructed that she should feel support/pressure from MedX restraints but no pain/discomfort. Patient demonstrated appropriate understanding of information.     [facility] 5/18/2023   Visit Number 1   VAS Pain Rating 3   Extension in Lying 20   Extension in Standing 10   Lumbar Extension Seat Pad 1   Femur Restraint 4   Top Dead Center 24   Counterweight 111   Lumbar Flexion 54   Lumbar Extension 0    Lumbar Peak Torque 48   Min Torque 18   Test Percent Below Normative Data 63   Ice - Z Lie (in min.) 10              Therapeutic Education/Activity provided for 30 minutes:   - Patient was given an [facility] Visit 1 handout which discusses the following:  - what to expect in therapy  - an overview of the program, including health coaching and wellness  - importance of spinal hygiene, proper posture, lifting mechanics, sleep quality, and nutrition/hydration   - Eda roll trialed, recommended, and purchase information was provided.  - Patient received a handout regarding anticipated muscular soreness following the isometric test and strategies for management were reviewed with patient including stretching, using ice and scheduled rest.   - Patient received verbal education on the following:   - Healthy Back program,   - purpose of the isometric test,   - safe progression of lumbar strengthening, wellness approach, and systemic strengthening.   - safe usage of MedX machine and testing protocols.    Rosalia Peraza received cold pack for 10 minutes to low back in Z-lie.    Assessment     Rosalia is a 72 y.o. female referred to [facility] with a medical diagnosis of lumbar spondylosis, lumbar radiculitis. Pt presents with chronic left sided low back pain with occasional left lateral thigh pain. She presents with flexed posture with some scoliosis with side bending to left and rotation to right. She has significant weakness of lumbar and core musculature. Her lumbar strength is 63% below normative data with isometric testing on MEDX. An active physical therapy program is recommended with the goal to restore function and reduce pain. A program of progressive resisted exercise, stretching, instruction in proper body mechanics, aerobic conditioning and a HEP will be included        Pain Pattern: Pattern 1 PEP       Pt prognosis is Excellent.     Pt will benefit from skilled outpatient Physical Therapy  to address the deficits stated above and in the chart below, to provide pt/family education, and to maximize pt's level of independence. Based on the above history and physical examination an active physical therapy program is recommended.      Plan of care discussed with patient: Yes  Pt's spiritual, cultural and educational needs considered and patient is agreeable to the plan of care and goals as stated below:     Anticipated Barriers for therapy: none    PT Evaluation Completed? Yes    Medical necessity is demonstrated by the following problem list:    History  Co-morbidities and personal factors that may impact the plan of care Co-morbidities:   history of cancer and back pain, arthritis    Personal Factors:   no deficits     moderate   Examination  Body Structures and Functions, activity limitations and participation restrictions that may impact the plan of care Body Regions:   back  lower extremities  trunk    Body Systems:    gross symmetry  ROM  strength  gross coordinated movement  balance  gait  transfers  transitions  motor control    Participation Restrictions:   none    Activity limitations:   Learning and applying knowledge  no deficits    General Tasks and Commands  no deficits    Communication  no deficits    Mobility  lifting and carrying objects    Self care  no deficits    Domestic Life  doing house work (cleaning house, washing dishes, laundry)    Interactions/Relationships  no deficits    Life Areas  no deficits    Community and Social Life  recreation and leisure         moderate   Clinical Presentation evolving clinical presentation with changing clinical characteristics moderate   Decision Making/ Complexity Score: moderate       GOALS: Pt is in agreement with the following goals.    Short term goals:  6 weeks or 10 visits   - Pt will demonstrate increased lumbar ROM by at least 3 degrees from the initial ROM value with improvements noted in functional ROM and ability to perform ADLs.  Appropriate and Ongoing  - Pt will demonstrate increased MedX average isometric strength value by 20% from initial test resulting in improved ability to perform bending, lifting, and carrying activities safely, confidently. Appropriate and Ongoing  - Pt will report a reduction in worst pain score by 1-2 points for improved tolerance for bending, rising from sitting. Appropriate and Ongoing  - Pt able to perform HEP correctly with minimal cueing or supervision from therapist to encourage independent management of symptoms. Appropriate and Ongoing    Long term goals: 10 weeks or 20 visits   - Pt will demonstrate increased lumbar ROM by at least 6 degrees from initial ROM value, resulting in improved ability to perform functional forward bending while standing and sitting. Appropriate and Ongoing  - Pt will demonstrate increased MedX average isometric strength value by 30% from initial test resulting in improved ability to perform bending, lifting, and carrying activities safely and confidently. Appropriate and Ongoing  - Pt to demonstrate ability to independently control and reduce their pain through posture positioning and mechanical movements throughout a typical day. Appropriate and Ongoing  - Pt will demonstrate reduced pain and improved functional outcomes as reported on the FOTO by reaching a limitation score of < or = 18% or less in order to demonstrate subjective improvement in pt's condition.   Appropriate and Ongoing  - Pt will demonstrate independence with the HEP at discharge. Appropriate and Ongoing  - Pt will be able to resolve and manage pain (patient goal) Appropriate and Ongoing    Plan     Outpatient physical therapy 2x week for 10 weeks or 20 visits to include the following:   - Patient education  - Therapeutic exercise  - Manual therapy  - Performance testing   - Neuromuscular Re-education  - Therapeutic activity   - Modalities    Pt may be seen by PTA as part of the rehabilitation team.      Therapist: Arabella Childress, PT  5/19/2023

## 2023-05-19 PROBLEM — G89.29 CHRONIC LEFT-SIDED LOW BACK PAIN WITHOUT SCIATICA: Status: ACTIVE | Noted: 2023-04-21

## 2023-05-19 PROBLEM — R29.898 DECREASED STRENGTH OF TRUNK AND BACK: Status: ACTIVE | Noted: 2023-05-19

## 2023-05-23 ENCOUNTER — CLINICAL SUPPORT (OUTPATIENT)
Dept: REHABILITATION | Facility: HOSPITAL | Age: 73
End: 2023-05-23
Payer: MEDICARE

## 2023-05-23 DIAGNOSIS — M54.50 CHRONIC LEFT-SIDED LOW BACK PAIN WITHOUT SCIATICA: Primary | ICD-10-CM

## 2023-05-23 DIAGNOSIS — G89.29 CHRONIC LEFT-SIDED LOW BACK PAIN WITHOUT SCIATICA: Primary | ICD-10-CM

## 2023-05-23 DIAGNOSIS — R29.898 DECREASED STRENGTH OF TRUNK AND BACK: ICD-10-CM

## 2023-05-23 PROCEDURE — 97110 THERAPEUTIC EXERCISES: CPT | Mod: PO,CQ

## 2023-05-23 PROCEDURE — 97112 NEUROMUSCULAR REEDUCATION: CPT | Mod: PO,CQ

## 2023-05-23 NOTE — PROGRESS NOTES
[facility]  Physical Therapy Treatment Note     Name: Rosalia Fernández  Clinic Number: [id number]    Therapy Diagnosis:   Encounter Diagnoses   Name Primary?    Chronic left-sided low back pain without sciatica Yes    Decreased strength of trunk and back      Physician: Dirk Alvarado PA-C    Visit Date: 2023  Physician Orders: PT Eval and Treat Healthy Back  Medical Diagnosis from Referral: lumbar spondylosis, lumbar radiculitis  Evaluation Date: 2023  Authorization Period Expiration: 24  Plan of Care Expiration: 2023  Reassessment Due: 2023  Visit # / Visits authorized:      Time In: 8:30 AM  Time Out: 9:25  Total Billable Time: 45 minutes  INSURANCE and OUTCOMES: Value Based Insurance with FOTO Outcomes 1/3     Precautions: standard     Pattern of pain determined: Pattern 1 PEP    Subjective     Rosalia reports that she is having pn with HEP specifically standing ext ex. She states that she does not like to exercise.      Patient reports tolerating previous visit some soreness, increased pn with HEP  Patient reports their pain to be 4/10 on a 0-10 scale with 0 being no pain and 10 being the worst pain imaginable.  Pain Location: back     Work and leisure: Occupation: retired  Leisure: golf, gardening, but not doing because of back      Pt goals: no pain     Objective      Baseline IM Testing Results:   Date of testin23  ROM 0-54 deg   Max Peak Torque 48    Min Peak Torque 18    Flex/Ext Ratio 2.6:1   % below normative data 63       Outcomes:  Limitation Score: 22% %  Visit 5 Score:   Visit 10 Score:   Discharge Score:     Treatment     Rosalia Peraza received the treatments listed below:      Rosalia Peraza received neuromuscular education for 15 minutes via participation on the Senor Sirloin Machine. Therapist assisted patient in isolating and engaging spinal stabilization musculature in order to improve functional ability and postural  control. Patient performed exercise with therapist guidance in order to accurately use pacer function, avoid valsalva, and optimally exert effort within a safe and effective range via the Carlee Exertion Rating Scale. Patient instructed to perform at a midrange of exertion and to complete 15-20 repetitions within appropriate split time, with proper technique, and while maintaining safety.     [facility] 5/23/2023   Visit Number 2   VAS Pain Rating 4   Treadmill Time (in min.) 10   Speed 2.4   Extension in Lying 20   Extension in Standing 10   Lumbar Extension Seat Pad -   Femur Restraint -   Top Dead Center -   Counterweight -   Lumbar Flexion -   Lumbar Extension -   Lumbar Peak Torque -   Min Torque -   Test Percent Below Normative Data -   Lumbar Weight 30   Repetitions 15   Rating of Perceived Exertion 4   Ice - Z Lie (in min.) 10          Rosalia Peraza participated in therapeutic exercises to develop strength, endurance, ROM, flexibility, posture, and core stabilization for 30 minutes including:    Prone on elbows 2 min  Press ups 2/10  Bridging 2/10  Standing extension x10    Peripheral muscle strengthening which included one set of 15-20 repetitions at a slow and controlled 10-13 second per rep pace focused on strengthening supporting musculature in order to improve body mechanics and functional mobility. Patient and therapist focused on proper form during treatment to ensure optimal strengthening of each targeted muscle group.  Machines utilized include torso rotation, leg extension, leg curl, chest press, upright row. Tricep extension, bicep curl, leg press, and hip abduction added visit 3    Rosalia Peraza participated in dynamic functional therapeutic activities to improve functional performance and simulate household and community activities for 00  minutes. The following activities were included:        Pt given cold pack for 10 minutes to back in supine.    Patient Education and Home Exercises      Home exercises include:  on eval  Cardio program (V5): -  Lifting education (V11): -  Posture/Lumbar roll: upcoming  Fridge [employer] Discharge handout (date given): -  Equipment at home/gym membership:     Education provided:   - PT role and POC  - HEP  - progression of Healthy Back Program    Written Home Exercises Provided: Patient instructed to cont prior HEP.  Exercises were reviewed and Rosalia Peraza was able to demonstrate them prior to the end of the session.  Rosalia Peraza demonstrated good  understanding of the education provided.     See EMR under Patient Instructions for exercises provided prior visit.    Assessment     Rosalia herrera today's tx with neuromuscular re-ed and ther ex well. She had some difficulty with Med X at 35#, wt reduced to 30# and she was able to complete 15 reps with low PE.She performed LE peripheral machines with low PE with the exception of rotation machine and Hip ADD. She had some difficulty initiating motion on rotation machine but improved with reps. She demonstrated exaggerated ROM with standing ext ex of HEP which provoked LBP and was re-instructed in pn free range with this ex. She was able to demonstrate proper form w/o complaint.     Patient is making good progress towards established goals.  Pt will continue to benefit from skilled outpatient physical therapy to address the deficits stated in the impairment chart, provide pt/family education and to maximize pt's level of independence in the home and community environment.     Anticipated Barriers for therapy: none  Pt's spiritual, cultural and educational needs considered and pt agreeable to plan of care and goals as stated below:     Goals:   Short term goals:  6 weeks or 10 visits   - Pt will demonstrate increased lumbar ROM by at least 3 degrees from the initial ROM value with improvements noted in functional ROM and ability to perform ADLs. Appropriate and Ongoing  - Pt will demonstrate increased MedX average isometric  strength value by 20% from initial test resulting in improved ability to perform bending, lifting, and carrying activities safely, confidently. Appropriate and Ongoing  - Pt will report a reduction in worst pain score by 1-2 points for improved tolerance for bending, rising from sitting. Appropriate and Ongoing  - Pt able to perform HEP correctly with minimal cueing or supervision from therapist to encourage independent management of symptoms. Appropriate and Ongoing     Long term goals: 10 weeks or 20 visits   - Pt will demonstrate increased lumbar ROM by at least 6 degrees from initial ROM value, resulting in improved ability to perform functional forward bending while standing and sitting. Appropriate and Ongoing  - Pt will demonstrate increased MedX average isometric strength value by 30% from initial test resulting in improved ability to perform bending, lifting, and carrying activities safely and confidently. Appropriate and Ongoing  - Pt to demonstrate ability to independently control and reduce their pain through posture positioning and mechanical movements throughout a typical day. Appropriate and Ongoing  - Pt will demonstrate reduced pain and improved functional outcomes as reported on the FOTO by reaching a limitation score of < or = 18% or less in order to demonstrate subjective improvement in pt's condition.   Appropriate and Ongoing  - Pt will demonstrate independence with the HEP at discharge. Appropriate and Ongoing  - Pt will be able to resolve and manage pain (patient goal) Appropriate and Ongoing    Plan     Continue with established Plan of Care towards established PT goals.   Outpatient physical therapy 2x week for 10 weeks or 20 visits to include the following:   - Patient education  - Therapeutic exercise  - Manual therapy  - Performance testing   - Neuromuscular Re-education  - Therapeutic activity   - Modalities     Pt may be seen by PTA as part of the rehabilitation team  Therapist: Merritt  Eliel, PTA  5/23/2023

## 2023-05-25 ENCOUNTER — CLINICAL SUPPORT (OUTPATIENT)
Dept: REHABILITATION | Facility: HOSPITAL | Age: 73
End: 2023-05-25
Payer: MEDICARE

## 2023-05-25 DIAGNOSIS — M54.50 CHRONIC LEFT-SIDED LOW BACK PAIN WITHOUT SCIATICA: Primary | ICD-10-CM

## 2023-05-25 DIAGNOSIS — R29.898 DECREASED STRENGTH OF TRUNK AND BACK: ICD-10-CM

## 2023-05-25 DIAGNOSIS — G89.29 CHRONIC LEFT-SIDED LOW BACK PAIN WITHOUT SCIATICA: Primary | ICD-10-CM

## 2023-05-25 PROCEDURE — 97112 NEUROMUSCULAR REEDUCATION: CPT | Mod: PO,CQ

## 2023-05-25 PROCEDURE — 97110 THERAPEUTIC EXERCISES: CPT | Mod: PO,CQ

## 2023-05-25 NOTE — PROGRESS NOTES
[facility]  Physical Therapy Treatment Note     Name: Rosalia Fernández  Clinic Number: [id number]    Therapy Diagnosis:   Encounter Diagnoses   Name Primary?    Chronic left-sided low back pain without sciatica Yes    Decreased strength of trunk and back        Physician: Dirk Alvarado PA-C    Visit Date: 2023  Physician Orders: PT Eval and Treat Healthy Back  Medical Diagnosis from Referral: lumbar spondylosis, lumbar radiculitis  Evaluation Date: 2023  Authorization Period Expiration: 24  Plan of Care Expiration: 2023  Reassessment Due: 2023  Visit # / Visits authorized: 3/1     Time In: 10:00AM  Time Out:11:17AM  Total Billable Time: 57 minutes  INSURANCE and OUTCOMES: Value Based Insurance with FOTO Outcomes 1/3     Precautions: standard     Pattern of pain determined: Pattern 1 PEP    Subjective     Rosalia reports she is having minimal L LB pain today. She gets LBP cleaning the floor.     Patient reports tolerating previous visit some soreness, increased pn with HEP  Patient reports their pain to be 4/10 on a 0-10 scale with 0 being no pain and 10 being the worst pain imaginable.  Pain Location: L lower back     Work and leisure: Occupation: retired  Leisure: golf, gardening, but not doing because of back      Pt goals: no pain     Objective      Baseline IM Testing Results:   Date of testin23  ROM 0-54 deg   Max Peak Torque 48    Min Peak Torque 18    Flex/Ext Ratio 2.6:1   % below normative data 63       Outcomes:  Limitation Score: 22% %  Visit 5 Score:   Visit 10 Score:   Discharge Score:     Treatment     Rosalia Peraza received the treatments listed below:      Rosalia Peraza received neuromuscular education for 20 minutes via participation on the [employer] Machine. Therapist assisted patient in isolating and engaging spinal stabilization musculature in order to improve functional ability and postural control. Patient  performed exercise with therapist guidance in order to accurately use pacer function, avoid valsalva, and optimally exert effort within a safe and effective range via the Carlee Exertion Rating Scale. Patient instructed to perform at a midrange of exertion and to complete 15-20 repetitions within appropriate split time, with proper technique, and while maintaining safety.         Rosalia Peraza participated in therapeutic exercises to develop strength, endurance, ROM, flexibility, posture, and core stabilization for 37 minutes including:    Prone on elbows 2 min  Press ups 2/10  Bridging 2/10  Standing extension x10  [facility] 5/25/2023   Visit Number 3   VAS Pain Rating 4   Treadmill Time (in min.) 10   Speed 2.4   Extension in Lying 20   Extension in Standing 10   Lumbar Extension Seat Pad -   Femur Restraint -   Top Dead Center -   Counterweight -   Lumbar Flexion -   Lumbar Extension -   Lumbar Peak Torque -   Min Torque -   Test Percent Below Normative Data -   Lumbar Weight 30   Repetitions 18   Rating of Perceived Exertion 4   Ice - Z Lie (in min.) 10      Peripheral muscle strengthening which included one set of 15-20 repetitions at a slow and controlled 10-13 second per rep pace focused on strengthening supporting musculature in order to improve body mechanics and functional mobility. Patient and therapist focused on proper form during treatment to ensure optimal strengthening of each targeted muscle group.  Machines utilized include torso rotation, leg extension, leg curl, chest press, upright row. Tricep extension, bicep curl, leg press, and hip abduction added visit 3    Rosalia Peraza participated in dynamic functional therapeutic activities to improve functional performance and simulate household and community activities for 00  minutes. The following activities were included:        Pt given cold pack for 10 minutes to back in supine.    Patient Education and Home Exercises     Home exercises  include:  on eval  Cardio program (V5): -  Lifting education (V11): -  Posture/Lumbar roll: upcoming  [employer] Discharge handout (date given): -  Equipment at home/gym membership:     Education provided:   - PT role and POC  - HEP  - progression of Healthy Back Program    Written Home Exercises Provided: Patient instructed to cont prior HEP.  Exercises were reviewed and Rosalia Peraza was able to demonstrate them prior to the end of the session.  Rosalia Peraza demonstrated good  understanding of the education provided.     See EMR under Patient Instructions for exercises provided prior visit.    Assessment     Rosalia herrera today's tx with neuromuscular re-ed and ther ex well. She tolerated lumbar extension machine better today and able to complete 18 reps with appropriate RPE.She performed all peripheral machines and torso with appropriate RPE. Prone push ups difficult due to B UE weakness. Encouraged her to perform EIS post cleaning the floor with trunk flexion. Pt understood.     Patient is making good progress towards established goals.  Pt will continue to benefit from skilled outpatient physical therapy to address the deficits stated in the impairment chart, provide pt/family education and to maximize pt's level of independence in the home and community environment.     Anticipated Barriers for therapy: none  Pt's spiritual, cultural and educational needs considered and pt agreeable to plan of care and goals as stated below:     Goals:   Short term goals:  6 weeks or 10 visits   - Pt will demonstrate increased lumbar ROM by at least 3 degrees from the initial ROM value with improvements noted in functional ROM and ability to perform ADLs. Appropriate and Ongoing  - Pt will demonstrate increased MedX average isometric strength value by 20% from initial test resulting in improved ability to perform bending, lifting, and carrying activities safely, confidently. Appropriate and Ongoing  - Pt will report a reduction  in worst pain score by 1-2 points for improved tolerance for bending, rising from sitting. Appropriate and Ongoing  - Pt able to perform HEP correctly with minimal cueing or supervision from therapist to encourage independent management of symptoms. Appropriate and Ongoing     Long term goals: 10 weeks or 20 visits   - Pt will demonstrate increased lumbar ROM by at least 6 degrees from initial ROM value, resulting in improved ability to perform functional forward bending while standing and sitting. Appropriate and Ongoing  - Pt will demonstrate increased MedX average isometric strength value by 30% from initial test resulting in improved ability to perform bending, lifting, and carrying activities safely and confidently. Appropriate and Ongoing  - Pt to demonstrate ability to independently control and reduce their pain through posture positioning and mechanical movements throughout a typical day. Appropriate and Ongoing  - Pt will demonstrate reduced pain and improved functional outcomes as reported on the FOTO by reaching a limitation score of < or = 18% or less in order to demonstrate subjective improvement in pt's condition.   Appropriate and Ongoing  - Pt will demonstrate independence with the HEP at discharge. Appropriate and Ongoing  - Pt will be able to resolve and manage pain (patient goal) Appropriate and Ongoing    Plan     Continue with established Plan of Care towards established PT goals.   Outpatient physical therapy 2x week for 10 weeks or 20 visits to include the following:   - Patient education  - Therapeutic exercise  - Manual therapy  - Performance testing   - Neuromuscular Re-education  - Therapeutic activity   - Modalities     Pt may be seen by PTA as part of the rehabilitation team  Therapist: Maddy Mcclendon PTA  5/25/2023

## 2023-05-29 ENCOUNTER — CLINICAL SUPPORT (OUTPATIENT)
Dept: REHABILITATION | Facility: HOSPITAL | Age: 73
End: 2023-05-29
Payer: MEDICARE

## 2023-05-29 DIAGNOSIS — G89.29 CHRONIC LEFT-SIDED LOW BACK PAIN WITHOUT SCIATICA: Primary | ICD-10-CM

## 2023-05-29 DIAGNOSIS — M54.50 CHRONIC LEFT-SIDED LOW BACK PAIN WITHOUT SCIATICA: Primary | ICD-10-CM

## 2023-05-29 DIAGNOSIS — R29.898 DECREASED STRENGTH OF TRUNK AND BACK: ICD-10-CM

## 2023-05-29 PROCEDURE — 97110 THERAPEUTIC EXERCISES: CPT | Mod: PO,CQ

## 2023-05-29 PROCEDURE — 97112 NEUROMUSCULAR REEDUCATION: CPT | Mod: PO,CQ

## 2023-05-29 NOTE — PROGRESS NOTES
[facility]  Physical Therapy Treatment Note     Name: Rosalia Peraaz [place]  Clinic Number: [id number]    Therapy Diagnosis:   Encounter Diagnoses   Name Primary?    Chronic left-sided low back pain without sciatica Yes    Decreased strength of trunk and back          Physician: Dirk Alvarado PA-C    Visit Date: 2023  Physician Orders: PT Eval and Treat Healthy Back  Medical Diagnosis from Referral: lumbar spondylosis, lumbar radiculitis  Evaluation Date: 2023  Authorization Period Expiration: 24  Plan of Care Expiration: 2023  Reassessment Due: 2023  Visit # / Visits authorized:      Time In: 10:00AM  Time Out:11:05AM  Total Billable Time: 45 minutes  INSURANCE and OUTCOMES: Value Based Insurance with FOTO Outcomes 1/3     Precautions: standard     Pattern of pain determined: Pattern 1 PEP    Subjective     Rosalia reports she  still having L lower back pain.    Patient reports tolerating previous visit some soreness, increased pn with HEP  Patient reports their pain to be 4/10 on a 0-10 scale with 0 being no pain and 10 being the worst pain imaginable.  Pain Location: L lower back     Work and leisure: Occupation: retired  Leisure: golf, gardening, but not doing because of back      Pt goals: no pain     Objective      Baseline IM Testing Results:   Date of testin23  ROM 0-54 deg   Max Peak Torque 48    Min Peak Torque 18    Flex/Ext Ratio 2.6:1   % below normative data 63       Outcomes:  Limitation Score: 22% %  Visit 5 Score:   Visit 10 Score:   Discharge Score:     Treatment     Rosalia Peraza received the treatments listed below:      Rosalia Peraza received neuromuscular education for 15 minutes via participation on the [employer] Machine. Therapist assisted patient in isolating and engaging spinal stabilization musculature in order to improve functional ability and postural control. Patient performed exercise with therapist  guidance in order to accurately use pacer function, avoid valsalva, and optimally exert effort within a safe and effective range via the Carlee Exertion Rating Scale. Patient instructed to perform at a midrange of exertion and to complete 15-20 repetitions within appropriate split time, with proper technique, and while maintaining safety.         Rosalia Peraza participated in therapeutic exercises to develop strength, endurance, ROM, flexibility, posture, and core stabilization for 30 minutes including:    Prone on elbows 2 min  Press ups 2/10  Bridging 2/10  Standing extension x10     [facility] 5/29/2023   Visit Number 4   VAS Pain Rating 4   Treadmill Time (in min.) 10   Speed 2.4   Extension in Lying 20   Extension in Standing 10   Lumbar Extension Seat Pad -   Femur Restraint -   Top Dead Center -   Counterweight -   Lumbar Flexion -   Lumbar Extension -   Lumbar Peak Torque -   Min Torque -   Test Percent Below Normative Data -   Lumbar Weight 30   Repetitions 20   Rating of Perceived Exertion 4   Ice - Z Lie (in min.) 10      Peripheral muscle strengthening which included one set of 15-20 repetitions at a slow and controlled 10-13 second per rep pace focused on strengthening supporting musculature in order to improve body mechanics and functional mobility. Patient and therapist focused on proper form during treatment to ensure optimal strengthening of each targeted muscle group.  Machines utilized include torso rotation, leg extension, leg curl, chest press, upright row. Tricep extension, bicep curl, leg press, and hip abduction added visit 3    Rosalia Peraza participated in dynamic functional therapeutic activities to improve functional performance and simulate household and community activities for 00  minutes. The following activities were included:        Pt given cold pack for 10 minutes to back in supine.    Patient Education and Home Exercises     Home exercises include:  on eval  Cardio program  (V5): -  Lifting education (V11): -  Posture/Lumbar roll: upcoming  [employer] Discharge handout (date given): -  Equipment at home/gym membership:     Education provided:   - PT role and POC  - HEP  - progression of Healthy Back Program    Written Home Exercises Provided: Patient instructed to cont prior HEP.  Exercises were reviewed and Rosalia Peraza was able to demonstrate them prior to the end of the session.  Rosalia Peraza demonstrated good  understanding of the education provided.     See EMR under Patient Instructions for exercises provided prior visit.    Assessment     Rosalia herrera today's tx with neuromuscular re-ed and ther ex well. She tolerated lumbar extension machine better today and able to complete 20 reps with appropriate RPE.She performed all peripheral machines and torso with appropriate RPE. Encouraged her again today to perform EIS post cleaning the floor with trunk flexion and other HEP 1-2/today. Pt understood.     Patient is making good progress towards established goals.  Pt will continue to benefit from skilled outpatient physical therapy to address the deficits stated in the impairment chart, provide pt/family education and to maximize pt's level of independence in the home and community environment.     Anticipated Barriers for therapy: none  Pt's spiritual, cultural and educational needs considered and pt agreeable to plan of care and goals as stated below:     Goals:   Short term goals:  6 weeks or 10 visits   - Pt will demonstrate increased lumbar ROM by at least 3 degrees from the initial ROM value with improvements noted in functional ROM and ability to perform ADLs. Appropriate and Ongoing  - Pt will demonstrate increased MedX average isometric strength value by 20% from initial test resulting in improved ability to perform bending, lifting, and carrying activities safely, confidently. Appropriate and Ongoing  - Pt will report a reduction in worst pain score by 1-2 points for  improved tolerance for bending, rising from sitting. Appropriate and Ongoing  - Pt able to perform HEP correctly with minimal cueing or supervision from therapist to encourage independent management of symptoms. Appropriate and Ongoing     Long term goals: 10 weeks or 20 visits   - Pt will demonstrate increased lumbar ROM by at least 6 degrees from initial ROM value, resulting in improved ability to perform functional forward bending while standing and sitting. Appropriate and Ongoing  - Pt will demonstrate increased MedX average isometric strength value by 30% from initial test resulting in improved ability to perform bending, lifting, and carrying activities safely and confidently. Appropriate and Ongoing  - Pt to demonstrate ability to independently control and reduce their pain through posture positioning and mechanical movements throughout a typical day. Appropriate and Ongoing  - Pt will demonstrate reduced pain and improved functional outcomes as reported on the FOTO by reaching a limitation score of < or = 18% or less in order to demonstrate subjective improvement in pt's condition.   Appropriate and Ongoing  - Pt will demonstrate independence with the HEP at discharge. Appropriate and Ongoing  - Pt will be able to resolve and manage pain (patient goal) Appropriate and Ongoing    Plan     Continue with established Plan of Care towards established PT goals.   Outpatient physical therapy 2x week for 10 weeks or 20 visits to include the following:   - Patient education  - Therapeutic exercise  - Manual therapy  - Performance testing   - Neuromuscular Re-education  - Therapeutic activity   - Modalities     Pt may be seen by PTA as part of the rehabilitation team  Therapist: Maddy Mcclendon PTA  5/29/2023

## 2023-06-01 ENCOUNTER — CLINICAL SUPPORT (OUTPATIENT)
Dept: REHABILITATION | Facility: HOSPITAL | Age: 73
End: 2023-06-01
Payer: MEDICARE

## 2023-06-01 DIAGNOSIS — G89.29 CHRONIC LEFT-SIDED LOW BACK PAIN WITHOUT SCIATICA: Primary | ICD-10-CM

## 2023-06-01 DIAGNOSIS — M54.50 CHRONIC LEFT-SIDED LOW BACK PAIN WITHOUT SCIATICA: Primary | ICD-10-CM

## 2023-06-01 DIAGNOSIS — R29.898 DECREASED STRENGTH OF TRUNK AND BACK: ICD-10-CM

## 2023-06-01 PROCEDURE — 97110 THERAPEUTIC EXERCISES: CPT | Mod: PO | Performed by: PHYSICAL THERAPIST

## 2023-06-01 PROCEDURE — 97112 NEUROMUSCULAR REEDUCATION: CPT | Mod: PO | Performed by: PHYSICAL THERAPIST

## 2023-06-01 NOTE — PROGRESS NOTES
[facility] OUTPATIENT THERAPY AND WELLNESS - HEALTHY BACK  Physical Therapy Treatment Note     Name: Rosalia Peraza [place]  Clinic Number: [id number]    Therapy Diagnosis:   Encounter Diagnoses   Name Primary?    Chronic left-sided low back pain without sciatica Yes    Decreased strength of trunk and back          Physician: Dirk Alvarado PA-C    Visit Date: 2023  Physician Orders: PT Eval and Treat Healthy Back  Medical Diagnosis from Referral: lumbar spondylosis, lumbar radiculitis  Evaluation Date: 2023  Authorization Period Expiration: 23  Plan of Care Expiration: 2023  Reassessment Due: 2023  Visit # / Visits authorized:  (+1 prior auth)     Time In: 1:00PM  Time Out:2:05PM  Total Billable Time: 55 minutes  INSURANCE and OUTCOMES: Value Based Insurance with FOTO Outcomes /3     Precautions: standard     Pattern of pain determined: Pattern 1 PEP    Subjective     Rosalia reports she  has been able to take less Tylenol. She reports decreased pain after each session, but it does return. She is using pillow for support with sitting.  Patient reports tolerating previous visit some soreness, increased pn with HEP  Patient reports their pain to be 2/10 on a 0-10 scale with 0 being no pain and 10 being the worst pain imaginable.  Pain Location: L lower back     Work and leisure: Occupation: retired  Leisure: golf, gardening, but not doing because of back      Pt goals: no pain     Objective      Baseline IM Testing Results:   Date of testin23  ROM 0-54 deg   Max Peak Torque 48    Min Peak Torque 18    Flex/Ext Ratio 2.6:1   % below normative data 63       Outcomes:  Limitation Score: 22%   Visit 5 Score:  20%  Visit 10 Score:   Discharge Score:     Treatment     Rosalia Peraza received the treatments listed below:      Rosalia Peraza received neuromuscular education for 15 minutes via participation on the [employer] Machine. Therapist assisted patient in isolating and engaging spinal  stabilization musculature in order to improve functional ability and postural control. Patient performed exercise with therapist guidance in order to accurately use pacer function, avoid valsalva, and optimally exert effort within a safe and effective range via the Carlee Exertion Rating Scale. Patient instructed to perform at a midrange of exertion and to complete 15-20 repetitions within appropriate split time, with proper technique, and while maintaining safety.         Rosalia Peraza participated in therapeutic exercises to develop strength, endurance, ROM, flexibility, posture, and core stabilization for 40 minutes including:    Prone on elbows 2 min  Press ups 2/10  Bridging 2/10  Standing extension x10     [facility] 6/1/2023   Visit Number 5   VAS Pain Rating 2   Treadmill Time (in min.) 10   Speed 2.5   Extension in Lying 20   Extension in Standing 10   Lumbar Extension Seat Pad -   Femur Restraint -   Top Dead Center -   Counterweight -   Lumbar Flexion -   Lumbar Extension -   Lumbar Peak Torque -   Min Torque -   Test Percent Below Normative Data -   Lumbar Weight 33   Repetitions 15   Rating of Perceived Exertion 3   Ice - Z Lie (in min.) 10          Peripheral muscle strengthening which included one set of 15-20 repetitions at a slow and controlled 10-13 second per rep pace focused on strengthening supporting musculature in order to improve body mechanics and functional mobility. Patient and therapist focused on proper form during treatment to ensure optimal strengthening of each targeted muscle group.  Machines utilized include torso rotation, leg extension, leg curl, chest press, upright row. Tricep extension, bicep curl, leg press, and hip abduction added visit 3    Rosalia Peraza participated in dynamic functional therapeutic activities to improve functional performance and simulate household and community activities for 00  minutes. The following activities were included:        Pt given cold  pack for 10 minutes to back in supine.    Patient Education and Home Exercises     Home exercises include:  on eval  Cardio program (V5): - 6/2/23  Lifting education (V11): -  Posture/Lumbar roll: upcoming  Fr[employer] Discharge handout (date given): -  Equipment at home/gym membership:     Education provided:   - PT role and POC  - HEP  - progression of Healthy Back Program    Written Home Exercises Provided: Patient instructed to cont prior HEP.  Exercises were reviewed and Rosalia Peraza was able to demonstrate them prior to the end of the session.  Rosalia Peraza demonstrated good  understanding of the education provided.     See EMR under Patient Instructions for exercises provided prior visit.    Assessment     Instructed in benefits of cardiovascular exercise and how to begin and pt given handout. Also given handout regarding health coaching. Increased resistance on MEDX with appropriate perceived exertion. Pt beginning to notice decrease in pain and she has decreased need for Tylenol.    Patient is making good progress towards established goals.  Pt will continue to benefit from skilled outpatient physical therapy to address the deficits stated in the impairment chart, provide pt/family education and to maximize pt's level of independence in the home and community environment.     Anticipated Barriers for therapy: none  Pt's spiritual, cultural and educational needs considered and pt agreeable to plan of care and goals as stated below:     Goals:   Short term goals:  6 weeks or 10 visits   - Pt will demonstrate increased lumbar ROM by at least 3 degrees from the initial ROM value with improvements noted in functional ROM and ability to perform ADLs. Appropriate and Ongoing  - Pt will demonstrate increased MedX average isometric strength value by 20% from initial test resulting in improved ability to perform bending, lifting, and carrying activities safely, confidently. Appropriate and Ongoing  - Pt will  report a reduction in worst pain score by 1-2 points for improved tolerance for bending, rising from sitting. Appropriate and Ongoing  - Pt able to perform HEP correctly with minimal cueing or supervision from therapist to encourage independent management of symptoms. Appropriate and Ongoing     Long term goals: 10 weeks or 20 visits   - Pt will demonstrate increased lumbar ROM by at least 6 degrees from initial ROM value, resulting in improved ability to perform functional forward bending while standing and sitting. Appropriate and Ongoing  - Pt will demonstrate increased MedX average isometric strength value by 30% from initial test resulting in improved ability to perform bending, lifting, and carrying activities safely and confidently. Appropriate and Ongoing  - Pt to demonstrate ability to independently control and reduce their pain through posture positioning and mechanical movements throughout a typical day. Appropriate and Ongoing  - Pt will demonstrate reduced pain and improved functional outcomes as reported on the FOTO by reaching a limitation score of < or = 18% or less in order to demonstrate subjective improvement in pt's condition.   Appropriate and Ongoing  - Pt will demonstrate independence with the HEP at discharge. Appropriate and Ongoing  - Pt will be able to resolve and manage pain (patient goal) Appropriate and Ongoing    Plan     Continue with established Plan of Care towards established PT goals.   Outpatient physical therapy 2x week for 10 weeks or 20 visits to include the following:   - Patient education  - Therapeutic exercise  - Manual therapy  - Performance testing   - Neuromuscular Re-education  - Therapeutic activity   - Modalities     Pt may be seen by PTA as part of the rehabilitation team  Therapist: Arabella Childress, PT  6/1/2023

## 2023-06-05 ENCOUNTER — CLINICAL SUPPORT (OUTPATIENT)
Dept: REHABILITATION | Facility: HOSPITAL | Age: 73
End: 2023-06-05
Payer: MEDICARE

## 2023-06-05 DIAGNOSIS — M54.50 CHRONIC LEFT-SIDED LOW BACK PAIN WITHOUT SCIATICA: Primary | ICD-10-CM

## 2023-06-05 DIAGNOSIS — R29.898 DECREASED STRENGTH OF TRUNK AND BACK: ICD-10-CM

## 2023-06-05 DIAGNOSIS — G89.29 CHRONIC LEFT-SIDED LOW BACK PAIN WITHOUT SCIATICA: Primary | ICD-10-CM

## 2023-06-05 PROCEDURE — 97112 NEUROMUSCULAR REEDUCATION: CPT | Mod: PO | Performed by: PHYSICAL THERAPIST

## 2023-06-05 PROCEDURE — 97110 THERAPEUTIC EXERCISES: CPT | Mod: PO | Performed by: PHYSICAL THERAPIST

## 2023-06-05 NOTE — PROGRESS NOTES
[facility]  Physical Therapy Treatment Note     Name: Rosalia Peraza [place]  Clinic Number: [id number]    Therapy Diagnosis:   Encounter Diagnoses   Name Primary?    Chronic left-sided low back pain without sciatica Yes    Decreased strength of trunk and back          Physician: Dirk Alvarado PA-C    Visit Date: 2023  Physician Orders: PT Eval and Treat Healthy Back  Medical Diagnosis from Referral: lumbar spondylosis, lumbar radiculitis  Evaluation Date: 2023  Authorization Period Expiration: 23  Plan of Care Expiration: 2023  Reassessment Due: 2023  Visit # / Visits authorized:  (+1 prior auth)     Time In: 1:35PM  Time Out: 2:40PM  Total Billable Time: 55 minutes  INSURANCE and OUTCOMES: Value Based Insurance with FOTO Outcomes /3     Precautions: standard     Pattern of pain determined: Pattern 1 PEP    Subjective     Rosalia reports she  has some LBP today, but overall decreased pain. Pt would like to resume her other exercises that she was previously taught in physical therapy.  Patient reports tolerating previous visit with decreased pain  Patient reports their pain to be 2/10 on a 0-10 scale with 0 being no pain and 10 being the worst pain imaginable.  Pain Location: L lower back     Work and leisure: Occupation: retired  Leisure: golf, gardening, but not doing because of back      Pt goals: no pain     Objective      Baseline IM Testing Results:   Date of testin23  ROM 0-54 deg   Max Peak Torque 48    Min Peak Torque 18    Flex/Ext Ratio 2.6:1   % below normative data 63       Outcomes:  Limitation Score: 22%   Visit 5 Score:  20%  Visit 10 Score:   Discharge Score:     Treatment     Rosalia Peraza received the treatments listed below:      Rosalia Peraza received neuromuscular education for 15 minutes via participation on the [employer] Machine. Therapist assisted patient in isolating and engaging spinal stabilization  musculature in order to improve functional ability and postural control. Patient performed exercise with therapist guidance in order to accurately use pacer function, avoid valsalva, and optimally exert effort within a safe and effective range via the Carlee Exertion Rating Scale. Patient instructed to perform at a midrange of exertion and to complete 15-20 repetitions within appropriate split time, with proper technique, and while maintaining safety.         Rosalia Peraza participated in therapeutic exercises to develop strength, endurance, ROM, flexibility, posture, and core stabilization for 40 minutes including:    Prone on elbows 2 min  Press ups 2/10  Bridging 2/10  Standing extension x10     [facility] 6/5/2023   Visit Number 6   VAS Pain Rating 2   Treadmill Time (in min.) 10   Speed 2.5   Extension in Lying 20   Extension in Standing 10   Lumbar Extension Seat Pad -   Femur Restraint -   Top Dead Center -   Counterweight -   Lumbar Flexion -   Lumbar Extension -   Lumbar Peak Torque -   Min Torque -   Test Percent Below Normative Data -   Lumbar Weight 33   Repetitions 20   Rating of Perceived Exertion 3   Ice - Z Lie (in min.) 10              Peripheral muscle strengthening which included one set of 15-20 repetitions at a slow and controlled 10-13 second per rep pace focused on strengthening supporting musculature in order to improve body mechanics and functional mobility. Patient and therapist focused on proper form during treatment to ensure optimal strengthening of each targeted muscle group.  Machines utilized include torso rotation, leg extension, leg curl, chest press, upright row. Tricep extension, bicep curl, leg press, and hip abduction added visit 3    Rosalia Peraza participated in dynamic functional therapeutic activities to improve functional performance and simulate household and community activities for 00  minutes. The following activities were included:        Pt given cold pack for  10 minutes to back in supine.    Patient Education and Home Exercises     Home exercises include:  on eval  Cardio program (V5): - 6/2/23  Lifting education (V11): -  Posture/Lumbar roll: upcoming  [employer] Discharge handout (date given): -  Equipment at home/gym membership:     Education provided:   - PT role and POC  - HEP  - progression of Healthy Back Program    Written Home Exercises Provided: Patient instructed to cont prior HEP.  Exercises were reviewed and Rosalia Peraza was able to demonstrate them prior to the end of the session.  Rosalia Peraza demonstrated good  understanding of the education provided.     See EMR under Patient Instructions for exercises provided prior visit.    Assessment     Discussed with pt that she can resume her previous exercises that she had been doing prior to starting PT, but recommend she end with some extension. May possibly need to hold off on KTC exercise, but will monitor for pain afterwards. She is progressing gradually with reps or resistance on MEDX, torso rotation and peripheral exercises with appropriate perceived exertion.    Patient is making good progress towards established goals.  Pt will continue to benefit from skilled outpatient physical therapy to address the deficits stated in the impairment chart, provide pt/family education and to maximize pt's level of independence in the home and community environment.     Anticipated Barriers for therapy: none  Pt's spiritual, cultural and educational needs considered and pt agreeable to plan of care and goals as stated below:     Goals:   Short term goals:  6 weeks or 10 visits   - Pt will demonstrate increased lumbar ROM by at least 3 degrees from the initial ROM value with improvements noted in functional ROM and ability to perform ADLs. Appropriate and Ongoing  - Pt will demonstrate increased MedX average isometric strength value by 20% from initial test resulting in improved ability to perform bending, lifting,  and carrying activities safely, confidently. Appropriate and Ongoing  - Pt will report a reduction in worst pain score by 1-2 points for improved tolerance for bending, rising from sitting. Appropriate and Ongoing  - Pt able to perform HEP correctly with minimal cueing or supervision from therapist to encourage independent management of symptoms. Appropriate and Ongoing     Long term goals: 10 weeks or 20 visits   - Pt will demonstrate increased lumbar ROM by at least 6 degrees from initial ROM value, resulting in improved ability to perform functional forward bending while standing and sitting. Appropriate and Ongoing  - Pt will demonstrate increased MedX average isometric strength value by 30% from initial test resulting in improved ability to perform bending, lifting, and carrying activities safely and confidently. Appropriate and Ongoing  - Pt to demonstrate ability to independently control and reduce their pain through posture positioning and mechanical movements throughout a typical day. Appropriate and Ongoing  - Pt will demonstrate reduced pain and improved functional outcomes as reported on the FOTO by reaching a limitation score of < or = 18% or less in order to demonstrate subjective improvement in pt's condition.   Appropriate and Ongoing  - Pt will demonstrate independence with the HEP at discharge. Appropriate and Ongoing  - Pt will be able to resolve and manage pain (patient goal) Appropriate and Ongoing    Plan     Continue with established Plan of Care towards established PT goals.   Outpatient physical therapy 2x week for 10 weeks or 20 visits to include the following:   - Patient education  - Therapeutic exercise  - Manual therapy  - Performance testing   - Neuromuscular Re-education  - Therapeutic activity   - Modalities     Pt may be seen by PTA as part of the rehabilitation team  Therapist: Arabella Childress, PT  6/5/2023

## 2023-06-08 ENCOUNTER — CLINICAL SUPPORT (OUTPATIENT)
Dept: REHABILITATION | Facility: HOSPITAL | Age: 73
End: 2023-06-08
Payer: MEDICARE

## 2023-06-08 DIAGNOSIS — G89.29 CHRONIC LEFT-SIDED LOW BACK PAIN WITHOUT SCIATICA: Primary | ICD-10-CM

## 2023-06-08 DIAGNOSIS — M54.50 CHRONIC LEFT-SIDED LOW BACK PAIN WITHOUT SCIATICA: Primary | ICD-10-CM

## 2023-06-08 DIAGNOSIS — R29.898 DECREASED STRENGTH OF TRUNK AND BACK: ICD-10-CM

## 2023-06-08 PROCEDURE — 97110 THERAPEUTIC EXERCISES: CPT | Mod: PO | Performed by: PHYSICAL THERAPIST

## 2023-06-08 PROCEDURE — 97112 NEUROMUSCULAR REEDUCATION: CPT | Mod: PO | Performed by: PHYSICAL THERAPIST

## 2023-06-08 NOTE — PROGRESS NOTES
[facility]  Physical Therapy Treatment Note     Name: Rosalia Peraza [place]  Clinic Number: [id number]    Therapy Diagnosis:   Encounter Diagnoses   Name Primary?    Chronic left-sided low back pain without sciatica Yes    Decreased strength of trunk and back            Physician: Dirk Alvarado PA-C    Visit Date: 2023  Physician Orders: PT Eval and Treat Healthy Back  Medical Diagnosis from Referral: lumbar spondylosis, lumbar radiculitis  Evaluation Date: 2023  Authorization Period Expiration: 23  Plan of Care Expiration: 2023  Reassessment Due: 2023  Visit # / Visits authorized:  (+1 prior auth)     Time In: 9:55AM  Time Out: 10:58AM  Total Billable Time: 53 minutes  INSURANCE and OUTCOMES: Value Based Insurance with FOTO Outcomes 1/3     Precautions: standard     Pattern of pain determined: Pattern 1 PEP    Subjective     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 She used a cream to help pain rather than medication. She reports decreased pain following exercise.  Patient reports tolerating previous visit with decreased pain  Patient reports their pain to be 2/10 on a 0-10 scale with 0 being no pain and 10 being the worst pain imaginable.  Pain Location: L lower back     Work and leisure: Occupation: retired  Leisure: golf, gardening, but not doing because of back      Pt goals: no pain     Objective      Baseline IM Testing Results:   Date of testin23  ROM 0-54 deg   Max Peak Torque 48    Min Peak Torque 18    Flex/Ext Ratio 2.6:1   % below normative data 63       Outcomes:  Limitation Score: 22%   Visit 5 Score:  20%  Visit 10 Score:   Discharge Score:     Treatment     Rosalia Peraza received the treatments listed below:      Rosalia Peraza received neuromuscular education for 15 minutes via participation on the [employer] Machine. Therapist assisted patient in isolating  and engaging spinal stabilization musculature in order to improve functional ability and postural control. Patient performed exercise with therapist guidance in order to accurately use pacer function, avoid valsalva, and optimally exert effort within a safe and effective range via the Carlee Exertion Rating Scale. Patient instructed to perform at a midrange of exertion and to complete 15-20 repetitions within appropriate split time, with proper technique, and while maintaining safety.         Rosalia Peraza participated in therapeutic exercises to develop strength, endurance, ROM, flexibility, posture, and core stabilization for 38 minutes including:    Prone on elbows 2 min  Press ups 2/10  Bridging 2/10  Standing extension x10     [facility] 6/8/2023   Visit Number 7   VAS Pain Rating 2   Treadmill Time (in min.) 10   Speed 2.5   Extension in Lying 20   Extension in Standing 10   Lumbar Extension Seat Pad -   Femur Restraint -   Top Dead Center -   Counterweight -   Lumbar Flexion -   Lumbar Extension -   Lumbar Peak Torque -   Min Torque -   Test Percent Below Normative Data -   Lumbar Weight 36   Repetitions 15   Rating of Perceived Exertion 4   Ice - Z Lie (in min.) 10         Peripheral muscle strengthening which included one set of 15-20 repetitions at a slow and controlled 10-13 second per rep pace focused on strengthening supporting musculature in order to improve body mechanics and functional mobility. Patient and therapist focused on proper form during treatment to ensure optimal strengthening of each targeted muscle group.  Machines utilized include torso rotation, leg extension, leg curl, chest press, upright row. Tricep extension, bicep curl, leg press, and hip abduction added visit 3    Rosalia Peraza participated in dynamic functional therapeutic activities to improve functional performance and simulate household and community activities for 00  minutes. The following activities were  included:        Pt given cold pack for 10 minutes to back in supine.    Patient Education and Home Exercises     Home exercises include:  on eval  Cardio program (V5): - 6/2/23  Lifting education (V11): -  Posture/Lumbar roll: upcoming  Fridge [employer] Discharge handout (date given): -  Equipment at home/gym membership:     Education provided:   - PT role and POC  - HEP  - progression of Healthy Back Program    Written Home Exercises Provided: Patient instructed to cont prior HEP.  Exercises were reviewed and Rosalia Peraza was able to demonstrate them prior to the end of the session.  Rosalia Peraza demonstrated good  understanding of the education provided.     See EMR under Patient Instructions for exercises provided prior visit.    Assessment     Pt consistently has decreased pain following her sessions. Still needs cueing to improve posture and perform extension exercises correctly. Able to increase resistance on MEDX with appropriate perceived exertion.    Patient is making good progress towards established goals.  Pt will continue to benefit from skilled outpatient physical therapy to address the deficits stated in the impairment chart, provide pt/family education and to maximize pt's level of independence in the home and community environment.     Anticipated Barriers for therapy: none  Pt's spiritual, cultural and educational needs considered and pt agreeable to plan of care and goals as stated below:     Goals:   Short term goals:  6 weeks or 10 visits   - Pt will demonstrate increased lumbar ROM by at least 3 degrees from the initial ROM value with improvements noted in functional ROM and ability to perform ADLs. Appropriate and Ongoing  - Pt will demonstrate increased MedX average isometric strength value by 20% from initial test resulting in improved ability to perform bending, lifting, and carrying activities safely, confidently. Appropriate and Ongoing  - Pt will report a reduction in worst pain score by  1-2 points for improved tolerance for bending, rising from sitting. Appropriate and Ongoing  - Pt able to perform HEP correctly with minimal cueing or supervision from therapist to encourage independent management of symptoms. Appropriate and Ongoing     Long term goals: 10 weeks or 20 visits   - Pt will demonstrate increased lumbar ROM by at least 6 degrees from initial ROM value, resulting in improved ability to perform functional forward bending while standing and sitting. Appropriate and Ongoing  - Pt will demonstrate increased MedX average isometric strength value by 30% from initial test resulting in improved ability to perform bending, lifting, and carrying activities safely and confidently. Appropriate and Ongoing  - Pt to demonstrate ability to independently control and reduce their pain through posture positioning and mechanical movements throughout a typical day. Appropriate and Ongoing  - Pt will demonstrate reduced pain and improved functional outcomes as reported on the FOTO by reaching a limitation score of < or = 18% or less in order to demonstrate subjective improvement in pt's condition.   Appropriate and Ongoing  - Pt will demonstrate independence with the HEP at discharge. Appropriate and Ongoing  - Pt will be able to resolve and manage pain (patient goal) Appropriate and Ongoing    Plan     Continue with established Plan of Care towards established PT goals.   Outpatient physical therapy 2x week for 10 weeks or 20 visits to include the following:   - Patient education  - Therapeutic exercise  - Manual therapy  - Performance testing   - Neuromuscular Re-education  - Therapeutic activity   - Modalities     Pt may be seen by PTA as part of the rehabilitation team  Therapist: Arabella Childress, PT  6/8/2023

## 2023-06-12 ENCOUNTER — CLINICAL SUPPORT (OUTPATIENT)
Dept: REHABILITATION | Facility: HOSPITAL | Age: 73
End: 2023-06-12
Payer: MEDICARE

## 2023-06-12 DIAGNOSIS — R29.898 DECREASED STRENGTH OF TRUNK AND BACK: ICD-10-CM

## 2023-06-12 DIAGNOSIS — G89.29 CHRONIC LEFT-SIDED LOW BACK PAIN WITHOUT SCIATICA: Primary | ICD-10-CM

## 2023-06-12 DIAGNOSIS — M54.50 CHRONIC LEFT-SIDED LOW BACK PAIN WITHOUT SCIATICA: Primary | ICD-10-CM

## 2023-06-12 PROCEDURE — 97110 THERAPEUTIC EXERCISES: CPT | Mod: PO | Performed by: PHYSICAL THERAPIST

## 2023-06-12 PROCEDURE — 97112 NEUROMUSCULAR REEDUCATION: CPT | Mod: PO | Performed by: PHYSICAL THERAPIST

## 2023-06-12 NOTE — PROGRESS NOTES
[facility]  Physical Therapy Treatment Note     Name: Rosalia Fernández  Clinic Number: [id number]    Therapy Diagnosis:   Encounter Diagnoses   Name Primary?    Chronic left-sided low back pain without sciatica Yes    Decreased strength of trunk and back            Physician: Dirk Alvarado PA-C    Visit Date: 2023  Physician Orders: PT Eval and Treat Healthy Back  Medical Diagnosis from Referral: lumbar spondylosis, lumbar radiculitis  Evaluation Date: 2023  Authorization Period Expiration: 23  Plan of Care Expiration: 2023  Reassessment Due: 2023  Visit # / Visits authorized:  (+1 prior auth)     Time In: 1:45PM  Time Out: 2:45PM  Total Billable Time: 50 minutes  INSURANCE and OUTCOMES: Value Based Insurance with FOTO Outcomes 1/3     Precautions: standard     Pattern of pain determined: Pattern 1 PEP    Subjective     Rosalia reports she is trying not to take the medication,specifically Tylenol. She is using a pain cream instead. She is not having pain currently.   Patient reports tolerating previous visit with decreased pain  Patient reports their pain to be 1/10 on a 0-10 scale with 0 being no pain and 10 being the worst pain imaginable.  Pain Location: L lower back     Work and leisure: Occupation: retired  Leisure: golf, gardening, but not doing because of back      Pt goals: no pain     Objective      Baseline IM Testing Results:   Date of testin23  ROM 0-54 deg   Max Peak Torque 48    Min Peak Torque 18    Flex/Ext Ratio 2.6:1   % below normative data 63       Outcomes:  Limitation Score: 22%   Visit 5 Score:  20%  Visit 10 Score:   Discharge Score:     Treatment     Rosalia Peraza received the treatments listed below:      Rosalia Peraza received neuromuscular education for 15 minutes via participation on the [employer] Machine. Therapist assisted patient in isolating and engaging spinal stabilization musculature in order  to improve functional ability and postural control. Patient performed exercise with therapist guidance in order to accurately use pacer function, avoid valsalva, and optimally exert effort within a safe and effective range via the Carlee Exertion Rating Scale. Patient instructed to perform at a midrange of exertion and to complete 15-20 repetitions within appropriate split time, with proper technique, and while maintaining safety.         Rosalia Peraza participated in therapeutic exercises to develop strength, endurance, ROM, flexibility, posture, and core stabilization for 35 minutes including:    Prone on elbows 2 min  Press ups 2/10  Bridging 2/10  Standing extension x10     [facility] 6/8/2023   Visit Number 7   VAS Pain Rating 2   Treadmill Time (in min.) 10   Speed 2.5   Extension in Lying 20   Extension in Standing 10   Lumbar Extension Seat Pad -   Femur Restraint -   Top Dead Center -   Counterweight -   Lumbar Flexion -   Lumbar Extension -   Lumbar Peak Torque -   Min Torque -   Test Percent Below Normative Data -   Lumbar Weight 36   Repetitions 15   Rating of Perceived Exertion 4   Ice - Z Lie (in min.) 10         Peripheral muscle strengthening which included one set of 15-20 repetitions at a slow and controlled 10-13 second per rep pace focused on strengthening supporting musculature in order to improve body mechanics and functional mobility. Patient and therapist focused on proper form during treatment to ensure optimal strengthening of each targeted muscle group.  Machines utilized include torso rotation, leg extension, leg curl, chest press, upright row. Tricep extension, bicep curl, leg press, and hip abduction added visit 3    Rosalia Peraza participated in dynamic functional therapeutic activities to improve functional performance and simulate household and community activities for 00  minutes. The following activities were included:        Pt given cold pack for 10 minutes to back in  supine.    Patient Education and Home Exercises     Home exercises include:  on eval  Cardio program (V5): - 6/2/23  Lifting education (V11): -  Posture/Lumbar roll: upcoming  [employer] Discharge handout (date given): -  Equipment at home/gym membership:     Education provided:   - PT role and POC  - HEP  - progression of Healthy Back Program    Written Home Exercises Provided: Patient instructed to cont prior HEP.  Exercises were reviewed and Rosalia Peraza was able to demonstrate them prior to the end of the session.  Rosalia Peraza demonstrated good  understanding of the education provided.     See EMR under Patient Instructions for exercises provided prior visit.    Assessment     Pt is making slow steady progress with strength as indicated by gradual increase in resistance with Neuromuscular activities and therapeutic exercises with appropriate perceived exertion. Her pain tends to increase later in day. Continue to encourage extension stretching in evening as well as morning. Continue to focus on posture and using lumbar support.  Patient is making good progress towards established goals.  Pt will continue to benefit from skilled outpatient physical therapy to address the deficits stated in the impairment chart, provide pt/family education and to maximize pt's level of independence in the home and community environment.     Anticipated Barriers for therapy: none  Pt's spiritual, cultural and educational needs considered and pt agreeable to plan of care and goals as stated below:     Goals:   Short term goals:  6 weeks or 10 visits   - Pt will demonstrate increased lumbar ROM by at least 3 degrees from the initial ROM value with improvements noted in functional ROM and ability to perform ADLs. Appropriate and Ongoing  - Pt will demonstrate increased MedX average isometric strength value by 20% from initial test resulting in improved ability to perform bending, lifting, and carrying activities safely,  confidently. Appropriate and Ongoing  - Pt will report a reduction in worst pain score by 1-2 points for improved tolerance for bending, rising from sitting. Appropriate and Ongoing  - Pt able to perform HEP correctly with minimal cueing or supervision from therapist to encourage independent management of symptoms. Appropriate and Ongoing     Long term goals: 10 weeks or 20 visits   - Pt will demonstrate increased lumbar ROM by at least 6 degrees from initial ROM value, resulting in improved ability to perform functional forward bending while standing and sitting. Appropriate and Ongoing  - Pt will demonstrate increased MedX average isometric strength value by 30% from initial test resulting in improved ability to perform bending, lifting, and carrying activities safely and confidently. Appropriate and Ongoing  - Pt to demonstrate ability to independently control and reduce their pain through posture positioning and mechanical movements throughout a typical day. Appropriate and Ongoing  - Pt will demonstrate reduced pain and improved functional outcomes as reported on the FOTO by reaching a limitation score of < or = 18% or less in order to demonstrate subjective improvement in pt's condition.   Appropriate and Ongoing  - Pt will demonstrate independence with the HEP at discharge. Appropriate and Ongoing  - Pt will be able to resolve and manage pain (patient goal) Appropriate and Ongoing    Plan     Continue with established Plan of Care towards established PT goals.   Outpatient physical therapy 2x week for 10 weeks or 20 visits to include the following:   - Patient education  - Therapeutic exercise  - Manual therapy  - Performance testing   - Neuromuscular Re-education  - Therapeutic activity   - Modalities     Pt may be seen by PTA as part of the rehabilitation team  Therapist: Arabella Childress, PT  6/12/2023

## 2023-06-16 ENCOUNTER — CLINICAL SUPPORT (OUTPATIENT)
Dept: REHABILITATION | Facility: HOSPITAL | Age: 73
End: 2023-06-16
Payer: MEDICARE

## 2023-06-16 DIAGNOSIS — M54.50 CHRONIC LEFT-SIDED LOW BACK PAIN WITHOUT SCIATICA: Primary | ICD-10-CM

## 2023-06-16 DIAGNOSIS — R29.898 DECREASED STRENGTH OF TRUNK AND BACK: ICD-10-CM

## 2023-06-16 DIAGNOSIS — G89.29 CHRONIC LEFT-SIDED LOW BACK PAIN WITHOUT SCIATICA: Primary | ICD-10-CM

## 2023-06-16 PROCEDURE — 97112 NEUROMUSCULAR REEDUCATION: CPT | Mod: PO,CQ

## 2023-06-16 PROCEDURE — 97110 THERAPEUTIC EXERCISES: CPT | Mod: PO,CQ

## 2023-06-16 NOTE — PROGRESS NOTES
[facility]  Physical Therapy Treatment Note     Name: Rosalia Fernández  Clinic Number: [id number]    Therapy Diagnosis:   Encounter Diagnoses   Name Primary?    Chronic left-sided low back pain without sciatica Yes    Decreased strength of trunk and back        Physician: Dirk Alvarado PA-C    Visit Date: 2023  Physician Orders: PT Eval and Treat Healthy Back  Medical Diagnosis from Referral: lumbar spondylosis, lumbar radiculitis  Evaluation Date: 2023  Authorization Period Expiration: 23  Plan of Care Expiration: 2023  Reassessment Due: 2023  Visit # / Visits authorized:  (+1 prior auth)     Time In: 1:45PM  Time Out: 2:45PM  Total Billable Time: 50 minutes  INSURANCE and OUTCOMES: Value Based Insurance with FOTO Outcomes 1/3     Precautions: standard     Pattern of pain determined: Pattern 1 PEP    Subjective     Rosalia reports she has been feeling better with minimal LBP. She has been feeling better.   Patient reports tolerating previous visit with decreased pain  Patient reports their pain to be 1/10 on a 0-10 scale with 0 being no pain and 10 being the worst pain imaginable.  Pain Location: L lower back     Work and leisure: Occupation: retired  Leisure: golf, gardening, but not doing because of back      Pt goals: no pain     Objective      Baseline IM Testing Results:   Date of testin23  ROM 0-54 deg   Max Peak Torque 48    Min Peak Torque 18    Flex/Ext Ratio 2.6:1   % below normative data 63       Outcomes:  Limitation Score: 22%   Visit 5 Score:  20%  Visit 10 Score:   Discharge Score:     Treatment     Rosalia Peraza received the treatments listed below:      Rosalia Peraza received neuromuscular education for 15 minutes via participation on the Plug.dj Machine. Therapist assisted patient in isolating and engaging spinal stabilization musculature in order to improve functional ability and postural control. Patient  performed exercise with therapist guidance in order to accurately use pacer function, avoid valsalva, and optimally exert effort within a safe and effective range via the Carlee Exertion Rating Scale. Patient instructed to perform at a midrange of exertion and to complete 15-20 repetitions within appropriate split time, with proper technique, and while maintaining safety.         Rosalia Peraza participated in therapeutic exercises to develop strength, endurance, ROM, flexibility, posture, and core stabilization for 35 minutes including:    Prone on elbows 2 min  Press ups 2/10  Bridging 2/10  Standing extension x10          Peripheral muscle strengthening which included one set of 15-20 repetitions at a slow and controlled 10-13 second per rep pace focused on strengthening supporting musculature in order to improve body mechanics and functional mobility. Patient and therapist focused on proper form during treatment to ensure optimal strengthening of each targeted muscle group.  Machines utilized include torso rotation, leg extension, leg curl, chest press, upright row. Tricep extension, bicep curl, leg press, and hip abduction added visit 3    Rosalia Peraza participated in dynamic functional therapeutic activities to improve functional performance and simulate household and community activities for 00  minutes. The following activities were included:        Pt given cold pack for 10 minutes to back in supine.    Patient Education and Home Exercises     Home exercises include:  on eval  Cardio program (V5): - 6/2/23  Lifting education (V11): -  Posture/Lumbar roll: upcoming  Fridge Magnet Discharge handout (date given): -  Equipment at home/gym membership:     Education provided:   - PT role and POC  - HEP  - progression of Healthy Back Program    Written Home Exercises Provided: Patient instructed to cont prior HEP.  Exercises were reviewed and Rosalia Peraza was able to demonstrate them prior to the end of the  session.  Rosalia Peraza demonstrated good  understanding of the education provided.     See EMR under Patient Instructions for exercises provided prior visit.    Assessment     Pt is making slow steady progress with strength as indicated by gradual increase in resistance with Neuromuscular activities and therapeutic exercises with appropriate perceived exertion. Continued to encourage pt to perform extension stretching in am and pm.   Patient is making good progress towards established goals.  Pt will continue to benefit from skilled outpatient physical therapy to address the deficits stated in the impairment chart, provide pt/family education and to maximize pt's level of independence in the home and community environment.     Anticipated Barriers for therapy: none  Pt's spiritual, cultural and educational needs considered and pt agreeable to plan of care and goals as stated below:     Goals:   Short term goals:  6 weeks or 10 visits   - Pt will demonstrate increased lumbar ROM by at least 3 degrees from the initial ROM value with improvements noted in functional ROM and ability to perform ADLs. Appropriate and Ongoing  - Pt will demonstrate increased MedX average isometric strength value by 20% from initial test resulting in improved ability to perform bending, lifting, and carrying activities safely, confidently. Appropriate and Ongoing  - Pt will report a reduction in worst pain score by 1-2 points for improved tolerance for bending, rising from sitting. Appropriate and Ongoing  - Pt able to perform HEP correctly with minimal cueing or supervision from therapist to encourage independent management of symptoms. Appropriate and Ongoing     Long term goals: 10 weeks or 20 visits   - Pt will demonstrate increased lumbar ROM by at least 6 degrees from initial ROM value, resulting in improved ability to perform functional forward bending while standing and sitting. Appropriate and Ongoing  - Pt will demonstrate  increased MedX average isometric strength value by 30% from initial test resulting in improved ability to perform bending, lifting, and carrying activities safely and confidently. Appropriate and Ongoing  - Pt to demonstrate ability to independently control and reduce their pain through posture positioning and mechanical movements throughout a typical day. Appropriate and Ongoing  - Pt will demonstrate reduced pain and improved functional outcomes as reported on the FOTO by reaching a limitation score of < or = 18% or less in order to demonstrate subjective improvement in pt's condition.   Appropriate and Ongoing  - Pt will demonstrate independence with the HEP at discharge. Appropriate and Ongoing  - Pt will be able to resolve and manage pain (patient goal) Appropriate and Ongoing    Plan     Continue with established Plan of Care towards established PT goals.   Outpatient physical therapy 2x week for 10 weeks or 20 visits to include the following:   - Patient education  - Therapeutic exercise  - Manual therapy  - Performance testing   - Neuromuscular Re-education  - Therapeutic activity   - Modalities    ds  Therapist: Maddy Mcclendon, JOZEF  6/16/2023

## 2023-06-19 ENCOUNTER — CLINICAL SUPPORT (OUTPATIENT)
Dept: REHABILITATION | Facility: HOSPITAL | Age: 73
End: 2023-06-19
Payer: MEDICARE

## 2023-06-19 DIAGNOSIS — M54.50 CHRONIC LEFT-SIDED LOW BACK PAIN WITHOUT SCIATICA: Primary | ICD-10-CM

## 2023-06-19 DIAGNOSIS — G89.29 CHRONIC LEFT-SIDED LOW BACK PAIN WITHOUT SCIATICA: Primary | ICD-10-CM

## 2023-06-19 DIAGNOSIS — R29.898 DECREASED STRENGTH OF TRUNK AND BACK: ICD-10-CM

## 2023-06-19 PROCEDURE — 97750 PHYSICAL PERFORMANCE TEST: CPT | Mod: PO | Performed by: PHYSICAL THERAPIST

## 2023-06-19 PROCEDURE — 97110 THERAPEUTIC EXERCISES: CPT | Mod: PO | Performed by: PHYSICAL THERAPIST

## 2023-06-19 NOTE — PROGRESS NOTES
[facility]  Physical Therapy Treatment Note     Name: Rosalia Peraza [place]  Clinic Number: [id number]    Therapy Diagnosis:   Encounter Diagnoses   Name Primary?    Chronic left-sided low back pain without sciatica Yes    Decreased strength of trunk and back          Physician: Dirk Alvarado PA-C    Visit Date: 2023  Physician Orders: PT Eval and Treat Healthy Back  Medical Diagnosis from Referral: lumbar spondylosis, lumbar radiculitis  Evaluation Date: 2023  Authorization Period Expiration: 23  Plan of Care Expiration: 2023  Reassessment: 23  Reassessment Due: 23  Visit # / Visits authorized:  (+1 prior auth)     Time In: 1:45PM  Time Out: 2:55PM  Total Billable Time: 60 minutes  INSURANCE and OUTCOMES: Value Based Insurance with FOTO Outcomes 1/3     Precautions: standard     Pattern of pain determined: Pattern 1 PEP    Subjective     Rosalia reports she had some increased pain this morning and used her cream. No pain currently. She typically will either lie down, use ice or use her cream. Reinforced doing her exercises for relief of pain.   Patient reports tolerating previous visit with decreased pain  Patient reports their pain to be 0/10 on a 0-10 scale with 0 being no pain and 10 being the worst pain imaginable.  Pain Location: L lower back     Work and leisure: Occupation: retired  Leisure: golf, gardening, but not doing because of back      Pt goals: no pain     Objective      Baseline IM Testing Results:   Date of testin23  ROM    0-57 deg 0-54 deg   Max Peak Torque 56 48    Min Peak Torque 46 18    Flex/Ext Ratio 1.2:1 2.6:1   % below normative data 44 63         Outcomes:  Limitation Score: 22%   Visit 5 Score:  20%  Visit 10 Score: 18%  Discharge Score:     Treatment     Rosalia Peraza received the treatments listed below:        Rosalia was seen for performance testing for spinal  Provided and discussed breastfeeding booklet with pt. Pt. Stated she has a breast pump at home. Pt. Stated infant has nurse well so far. Pt. Stated that she had trouble with latching her last child. Will continue to follow up with pt. PRN. musculature  to engage spinal musculature correctly for isometric testing within patient's range of motion, with comparison to baseline testing for  20 minutes.  This includes the MedX practice exercise component and practice and standard testing.  Med x dynamic exercise and testing performed with instructions to guide the patient safely through the isometric testing.  Patient informed to perform isometric test correctly, and safely, building best force they safely can and not pushing through pain.  Patient demonstrated understanding of information       Rosalia Peraza received neuromuscular education for 00 minutes via participation on the Medical MedX Machine. Therapist assisted patient in isolating and engaging spinal stabilization musculature in order to improve functional ability and postural control. Patient performed exercise with therapist guidance in order to accurately use pacer function, avoid valsalva, and optimally exert effort within a safe and effective range via the Carlee Exertion Rating Scale. Patient instructed to perform at a midrange of exertion and to complete 15-20 repetitions within appropriate split time, with proper technique, and while maintaining safety.     [facility] 6/19/2023   Visit Number 9   VAS Pain Rating 0   Treadmill Time (in min.) 10   Speed 2.5   Extension in Lying 20   Extension in Standing 10   Lumbar Extension Seat Pad -   Femur Restraint -   Top Dead Center -   Counterweight -   Lumbar Flexion 57   Lumbar Extension 0   Lumbar Peak Torque 56   Min Torque 46   Test Percent Below Normative Data 44   Test Percent Gain in Strength from Initial  59   Lumbar Weight -   Repetitions -   Rating of Perceived Exertion -   Ice - Z Lie (in min.) 10         Rosalia Peraza participated in therapeutic exercises to develop strength, endurance, ROM, flexibility, posture, and core stabilization for 40 minutes including:    Prone on elbows 2 min  Press ups 2/10  Bridging 2/10  Standing  extension x10          Peripheral muscle strengthening which included one set of 15-20 repetitions at a slow and controlled 10-13 second per rep pace focused on strengthening supporting musculature in order to improve body mechanics and functional mobility. Patient and therapist focused on proper form during treatment to ensure optimal strengthening of each targeted muscle group.  Machines utilized include torso rotation, leg extension, leg curl, chest press, upright row. Tricep extension, bicep curl, leg press, and hip abduction added visit 3    Rosalia Peraza participated in dynamic functional therapeutic activities to improve functional performance and simulate household and community activities for 00  minutes. The following activities were included:        Pt given cold pack for 10 minutes to back in supine.    Patient Education and Home Exercises     Home exercises include:  on eval  Cardio program (V5): - 6/2/23  Lifting education (V11): -  Posture/Lumbar roll: upcoming  Fridge Magnet Discharge handout (date given): -  Equipment at home/gym membership:     Education provided:   - PT role and POC  - HEP  - progression of Healthy Back Program    Written Home Exercises Provided: Patient instructed to cont prior HEP.  Exercises were reviewed and Rosalia Peraza was able to demonstrate them prior to the end of the session.  Rosalia Peraza demonstrated good  understanding of the education provided.     See EMR under Patient Instructions for exercises provided prior visit.    Assessment       Reassessment 6/19/23:  Patient has attended 10 visits at [facility] which included MD evaluation, PT evaluation with isometric testing, and physical therapy treatment including HEP instruction, education, aerobic activity, dynamic strengthening on MedX equipment for the spine, and whole body strengthening on MedX equipment with increasing resistance. Patient demonstrates increased ability to reduce symptoms, improve  posture, improve ROM, and improve strength, as stated below:    -Improved posture, she is using lumbar roll  -Improved lumbar ROM, 0-54 degrees initially on MedX test and 0-57 degrees currently.  -Improved strength at each test point on lumbar MedX isometric test with 59 % average improvement noted with reduced pain noted by patient.  -Initial outcome tool score of 22% and current outcome tool score of 18% indicating reduced pain and improved function.        Patient is making good progress towards established goals.  Pt will continue to benefit from skilled outpatient physical therapy to address the deficits stated in the impairment chart, provide pt/family education and to maximize pt's level of independence in the home and community environment.     Anticipated Barriers for therapy: none  Pt's spiritual, cultural and educational needs considered and pt agreeable to plan of care and goals as stated below:     Goals:   Short term goals:  6 weeks or 10 visits   - Pt will demonstrate increased lumbar ROM by at least 3 degrees from the initial ROM value with improvements noted in functional ROM and ability to perform ADLs. Appropriate and Ongoing Met 6/19/23  - Pt will demonstrate increased MedX average isometric strength value by 20% from initial test resulting in improved ability to perform bending, lifting, and carrying activities safely, confidently. Appropriate and Ongoing Met 6/19/23  - Pt will report a reduction in worst pain score by 1-2 points for improved tolerance for bending, rising from sitting. Appropriate and Ongoing Met 6/19/23  - Pt able to perform HEP correctly with minimal cueing or supervision from therapist to encourage independent management of symptoms. Appropriate and Ongoing Met 6/19/23     Long term goals: 10 weeks or 20 visits   - Pt will demonstrate increased lumbar ROM by at least 6 degrees from initial ROM value, resulting in improved ability to perform functional forward bending while  standing and sitting. Appropriate and Ongoing Progressing  - Pt will demonstrate increased MedX average isometric strength value by 30% from initial test resulting in improved ability to perform bending, lifting, and carrying activities safely and confidently. Appropriate and Ongoing Met 6/19/23  - Pt to demonstrate ability to independently control and reduce their pain through posture positioning and mechanical movements throughout a typical day. Appropriate and Ongoing Progressing  - Pt will demonstrate reduced pain and improved functional outcomes as reported on the FOTO by reaching a limitation score of < or = 18% or less in order to demonstrate subjective improvement in pt's condition.   Appropriate and Ongoing Met 6/19/23  - Pt will demonstrate independence with the HEP at discharge. Appropriate and Ongoing  - Pt will be able to resolve and manage pain (patient goal) Appropriate and Ongoing    Plan     Continue with established Plan of Care towards established PT goals.   Outpatient physical therapy 2x week for 10 weeks or 20 visits to include the following:   - Patient education  - Therapeutic exercise  - Manual therapy  - Performance testing   - Neuromuscular Re-education  - Therapeutic activity   - Modalities    ds  Therapist: Arabella Childress, PT  6/19/2023

## 2023-06-23 ENCOUNTER — CLINICAL SUPPORT (OUTPATIENT)
Dept: REHABILITATION | Facility: HOSPITAL | Age: 73
End: 2023-06-23
Payer: MEDICARE

## 2023-06-23 DIAGNOSIS — R29.898 DECREASED STRENGTH OF TRUNK AND BACK: ICD-10-CM

## 2023-06-23 DIAGNOSIS — G89.29 CHRONIC LEFT-SIDED LOW BACK PAIN WITHOUT SCIATICA: Primary | ICD-10-CM

## 2023-06-23 DIAGNOSIS — M54.50 CHRONIC LEFT-SIDED LOW BACK PAIN WITHOUT SCIATICA: Primary | ICD-10-CM

## 2023-06-23 PROCEDURE — 97112 NEUROMUSCULAR REEDUCATION: CPT | Mod: PO | Performed by: PHYSICAL THERAPIST

## 2023-06-23 PROCEDURE — 97110 THERAPEUTIC EXERCISES: CPT | Mod: PO | Performed by: PHYSICAL THERAPIST

## 2023-06-23 NOTE — PROGRESS NOTES
[facility]  Physical Therapy Treatment Note     Name: Rosalia Peraza [place]  Clinic Number: [id number]    Therapy Diagnosis:   Encounter Diagnoses   Name Primary?    Chronic left-sided low back pain without sciatica Yes    Decreased strength of trunk and back          Physician: Dirk Alvarado PA-C    Visit Date: 2023  Physician Orders: PT Eval and Treat [facility]  Medical Diagnosis from Referral: lumbar spondylosis, lumbar radiculitis  Evaluation Date: 2023  Authorization Period Expiration: 23  Plan of Care Expiration: 2023  Reassessment: 23  Reassessment Due: 23  Visit # / Visits authorized: 10/20 (+1 prior auth)     Time In: 9:55AM  Time Out: 10:51AM  Total Billable Time: 46 minutes  INSURANCE and OUTCOMES: Value Based Insurance with FOTO Outcomes 1/3     Precautions: standard     Pattern of pain determined: Pattern 1 PEP    Subjective     Rosalia reports she is feeling pretty good this morning with minimal pain.   Patient reports tolerating previous visit with decreased pain  Patient reports their pain to be 1/10 on a 0-10 scale with 0 being no pain and 10 being the worst pain imaginable.  Pain Location: L lower back     Work and leisure: Occupation: retired  Leisure: golf, gardening, but not doing because of back      Pt goals: no pain     Objective      Baseline IM Testing Results:   Date of testin23  ROM    0-57 deg 0-54 deg   Max Peak Torque 56 48    Min Peak Torque 46 18    Flex/Ext Ratio 1.2:1 2.6:1   % below normative data 44 63         Outcomes:  Limitation Score: 22%   Visit 5 Score:  20%  Visit 10 Score: 18%  Discharge Score:     Treatment     Roslaia Peraza received the treatments listed below:        Rosalia Peraza received neuromuscular education for 10 minutes via participation on the UpTap Machine. Therapist assisted patient in isolating and engaging spinal stabilization  musculature in order to improve functional ability and postural control. Patient performed exercise with therapist guidance in order to accurately use pacer function, avoid valsalva, and optimally exert effort within a safe and effective range via the Carlee Exertion Rating Scale. Patient instructed to perform at a midrange of exertion and to complete 15-20 repetitions within appropriate split time, with proper technique, and while maintaining safety.     [facility] 6/23/2023   Visit Number 11   VAS Pain Rating 1   Treadmill Time (in min.) 10   Speed 2.5   Extension in Lying 20   Extension in Standing 10   Lumbar Extension Seat Pad -   Femur Restraint -   Top Dead Center -   Counterweight -   Lumbar Flexion -   Lumbar Extension -   Lumbar Peak Torque -   Min Torque -   Test Percent Below Normative Data -   Test Percent Gain in Strength from Initial  -   Lumbar Weight 39   Repetitions 20   Rating of Perceived Exertion 4   Ice - Z Lie (in min.) 10             Rosalia Peraza participated in therapeutic exercises to develop strength, endurance, ROM, flexibility, posture, and core stabilization for 31 minutes including:    Prone on elbows 2 min  Press ups 2/10  Bridging 2/10  Standing extension x10          Peripheral muscle strengthening which included one set of 15-20 repetitions at a slow and controlled 10-13 second per rep pace focused on strengthening supporting musculature in order to improve body mechanics and functional mobility. Patient and therapist focused on proper form during treatment to ensure optimal strengthening of each targeted muscle group.  Machines utilized include torso rotation, leg extension, leg curl, chest press, upright row. Tricep extension, bicep curl, leg press, and hip abduction added visit 3    Rosalia Peraza participated in dynamic functional therapeutic activities to improve functional performance and simulate household and community activities for 05 minutes. The following  activities were included:        Pt given cold pack for 10 minutes to back in supine.    Patient Education and Home Exercises     Home exercises include:  on eval  Cardio program (V5): - 6/2/23  Lifting education (V11): -6/23/23  Posture/Lumbar roll: upcoming  Fridge Magnet Discharge handout (date given): -  Equipment at home/gym membership:     Education provided:   - PT role and POC  - HEP  - progression of Healthy [facility] Program    Written Home Exercises Provided: Patient instructed to cont prior HEP.  Exercises were reviewed and Rosalia Peraza was able to demonstrate them prior to the end of the session.  Rosalia Peraza demonstrated good  understanding of the education provided.     See EMR under Patient Instructions for exercises provided prior visit.    Assessment     Instructed pt in do's and Dont's of lifting and pt given handout for reference. She reports consistent decrease in pain after each session, but pain increases by end of day. Pt continues to report 4-5 perceived exertion with most exercises and activities. Continue to reinforce postural correction and awareness.      Reassessment 6/19/23:  Patient has attended 10 visits at [facility] which included MD evaluation, PT evaluation with isometric testing, and physical therapy treatment including HEP instruction, education, aerobic activity, dynamic strengthening on MedX equipment for the spine, and whole body strengthening on MedX equipment with increasing resistance. Patient demonstrates increased ability to reduce symptoms, improve posture, improve ROM, and improve strength, as stated below:    -Improved posture, she is using lumbar roll  -Improved lumbar ROM, 0-54 degrees initially on MedX test and 0-57 degrees currently.  -Improved strength at each test point on lumbar MedX isometric test with 59 % average improvement noted with reduced pain noted by patient.  -Initial outcome tool score of 22% and current outcome tool score of 18% indicating  reduced pain and improved function.        Patient is making good progress towards established goals.  Pt will continue to benefit from skilled outpatient physical therapy to address the deficits stated in the impairment chart, provide pt/family education and to maximize pt's level of independence in the home and community environment.     Anticipated Barriers for therapy: none  Pt's spiritual, cultural and educational needs considered and pt agreeable to plan of care and goals as stated below:     Goals:   Short term goals:  6 weeks or 10 visits   - Pt will demonstrate increased lumbar ROM by at least 3 degrees from the initial ROM value with improvements noted in functional ROM and ability to perform ADLs. Appropriate and Ongoing Met 6/19/23  - Pt will demonstrate increased MedX average isometric strength value by 20% from initial test resulting in improved ability to perform bending, lifting, and carrying activities safely, confidently. Appropriate and Ongoing Met 6/19/23  - Pt will report a reduction in worst pain score by 1-2 points for improved tolerance for bending, rising from sitting. Appropriate and Ongoing Met 6/19/23  - Pt able to perform HEP correctly with minimal cueing or supervision from therapist to encourage independent management of symptoms. Appropriate and Ongoing Met 6/19/23     Long term goals: 10 weeks or 20 visits   - Pt will demonstrate increased lumbar ROM by at least 6 degrees from initial ROM value, resulting in improved ability to perform functional forward bending while standing and sitting. Appropriate and Ongoing Progressing  - Pt will demonstrate increased MedX average isometric strength value by 30% from initial test resulting in improved ability to perform bending, lifting, and carrying activities safely and confidently. Appropriate and Ongoing Met 6/19/23  - Pt to demonstrate ability to independently control and reduce their pain through posture positioning and mechanical  movements throughout a typical day. Appropriate and Ongoing Progressing  - Pt will demonstrate reduced pain and improved functional outcomes as reported on the FOTO by reaching a limitation score of < or = 18% or less in order to demonstrate subjective improvement in pt's condition.   Appropriate and Ongoing Met 6/19/23  - Pt will demonstrate independence with the HEP at discharge. Appropriate and Ongoing  - Pt will be able to resolve and manage pain (patient goal) Appropriate and Ongoing    Plan     Continue with established Plan of Care towards established PT goals.   Outpatient physical therapy 2x week for 10 weeks or 20 visits to include the following:   - Patient education  - Therapeutic exercise  - Manual therapy  - Performance testing   - Neuromuscular Re-education  - Therapeutic activity   - Modalities    ds  Therapist: Arabella Childress, PT  6/23/2023

## 2023-06-26 ENCOUNTER — CLINICAL SUPPORT (OUTPATIENT)
Dept: REHABILITATION | Facility: HOSPITAL | Age: 73
End: 2023-06-26
Payer: MEDICARE

## 2023-06-26 DIAGNOSIS — G89.29 CHRONIC LEFT-SIDED LOW BACK PAIN WITHOUT SCIATICA: Primary | ICD-10-CM

## 2023-06-26 DIAGNOSIS — R29.898 DECREASED STRENGTH OF TRUNK AND BACK: ICD-10-CM

## 2023-06-26 DIAGNOSIS — M54.50 CHRONIC LEFT-SIDED LOW BACK PAIN WITHOUT SCIATICA: Primary | ICD-10-CM

## 2023-06-26 PROCEDURE — 97110 THERAPEUTIC EXERCISES: CPT | Mod: PO | Performed by: PHYSICAL THERAPIST

## 2023-06-26 PROCEDURE — 97112 NEUROMUSCULAR REEDUCATION: CPT | Mod: PO | Performed by: PHYSICAL THERAPIST

## 2023-06-26 NOTE — PROGRESS NOTES
[facility]  Physical Therapy Treatment Note     Name: Rosalia Peraza [place]  Clinic Number: [id number]    Therapy Diagnosis:   Encounter Diagnoses   Name Primary?    Chronic left-sided low back pain without sciatica Yes    Decreased strength of trunk and back          Physician: Dirk Alvarado PA-C    Visit Date: 2023  Physician Orders: PT Eval and Treat Healthy Back  Medical Diagnosis from Referral: lumbar spondylosis, lumbar radiculitis  Evaluation Date: 2023  Authorization Period Expiration: 23  Plan of Care Expiration: 2023  Reassessment: 23  Reassessment Due: 23  Visit # / Visits authorized:  (+1 prior auth)     Time In: 11:30AM  Time Out: 12:25PM  Total Billable Time: 45 minutes  INSURANCE and OUTCOMES: Value Based Insurance with FOTO Outcomes 1/3     Precautions: standard     Pattern of pain determined: Pattern 1 PEP    Subjective     Rosalia reports she has increased pain after visiting mother and she does a lot of sitting when there. She is not using lumbar roll, but puts pillows behind her back.  Patient reports tolerating previous visit with decreased pain  Patient reports their pain to be 3-4/10 on a 0-10 scale with 0 being no pain and 10 being the worst pain imaginable.  Pain Location: L lower back     Work and leisure: Occupation: retired  Leisure: golf, gardening, but not doing because of back      Pt goals: no pain     Objective      Baseline IM Testing Results:   Date of testin23  ROM    0-57 deg 0-54 deg   Max Peak Torque 56 48    Min Peak Torque 46 18    Flex/Ext Ratio 1.2:1 2.6:1   % below normative data 44 63         Outcomes:  Limitation Score: 22%   Visit 5 Score:  20%  Visit 10 Score: 18%  Discharge Score:     Treatment     Rosalia Peraza received the treatments listed below:        Rosalia Peraza received neuromuscular education for 15 minutes via participation on the  Medical MedX Machine. Therapist assisted patient in isolating and engaging spinal stabilization musculature in order to improve functional ability and postural control. Patient performed exercise with therapist guidance in order to accurately use pacer function, avoid valsalva, and optimally exert effort within a safe and effective range via the Carlee Exertion Rating Scale. Patient instructed to perform at a midrange of exertion and to complete 15-20 repetitions within appropriate split time, with proper technique, and while maintaining safety.     [facility] 6/26/2023   Visit Number 12   VAS Pain Rating 3   Treadmill Time (in min.) 10   Speed 2.5   Extension in Lying 20   Extension in Standing 10   Lumbar Extension Seat Pad -   Femur Restraint -   Top Dead Center -   Counterweight -   Lumbar Flexion -   Lumbar Extension -   Lumbar Peak Torque -   Min Torque -   Test Percent Below Normative Data -   Test Percent Gain in Strength from Initial  -   Lumbar Weight 42   Repetitions 18   Rating of Perceived Exertion 5   Ice - Z Lie (in min.) 10       Rosalia Peraza participated in therapeutic exercises to develop strength, endurance, ROM, flexibility, posture, and core stabilization for 30 minutes including:    Prone on elbows 2 min  Press ups 2/10  Bridging 2/10  Standing extension x10          Peripheral muscle strengthening which included one set of 15-20 repetitions at a slow and controlled 10-13 second per rep pace focused on strengthening supporting musculature in order to improve body mechanics and functional mobility. Patient and therapist focused on proper form during treatment to ensure optimal strengthening of each targeted muscle group.  Machines utilized include torso rotation, leg extension, leg curl, chest press, upright row. Tricep extension, bicep curl, leg press, and hip abduction added visit 3    Rosalia Peraza participated in dynamic functional therapeutic activities to improve functional  performance and simulate household and community activities for 05 minutes. The following activities were included:        Pt given cold pack for 10 minutes to back in supine.    Patient Education and Home Exercises     Home exercises include:  on eval  Cardio program (V5): - 6/2/23  Lifting education (V11): -6/23/23  Posture/Lumbar roll: upcoming  Fridge Magnet Discharge handout (date given): -  Equipment at home/gym membership:     Education provided:   - PT role and POC  - HEP  - progression of [facility] Program    Written Home Exercises Provided: Patient instructed to cont prior HEP.  Exercises were reviewed and Rosalia Peraza was able to demonstrate them prior to the end of the session.  Rosalia Peraza demonstrated good  understanding of the education provided.     See EMR under Patient Instructions for exercises provided prior visit.    Assessment     Discussed benefits of using lumbar roll behind back with sitting as opposed to pillows which do not fully support her lumbar lordosis. Pt expressed understanding. Increased resistance on MEDX with 5/10 perceived exertion. She is doing exercises regularly and reports decreased pain afterwards. Continue to reinforce sitting and standing posture.    Reassessment 6/19/23:  Patient has attended 10 visits at [facility] which included MD evaluation, PT evaluation with isometric testing, and physical therapy treatment including HEP instruction, education, aerobic activity, dynamic strengthening on MedX equipment for the spine, and whole body strengthening on MedX equipment with increasing resistance. Patient demonstrates increased ability to reduce symptoms, improve posture, improve ROM, and improve strength, as stated below:    -Improved posture, she is using lumbar roll  -Improved lumbar ROM, 0-54 degrees initially on MedX test and 0-57 degrees currently.  -Improved strength at each test point on lumbar MedX isometric test with 59 % average improvement noted  with reduced pain noted by patient.  -Initial outcome tool score of 22% and current outcome tool score of 18% indicating reduced pain and improved function.        Patient is making good progress towards established goals.  Pt will continue to benefit from skilled outpatient physical therapy to address the deficits stated in the impairment chart, provide pt/family education and to maximize pt's level of independence in the home and community environment.     Anticipated Barriers for therapy: none  Pt's spiritual, cultural and educational needs considered and pt agreeable to plan of care and goals as stated below:     Goals:   Short term goals:  6 weeks or 10 visits   - Pt will demonstrate increased lumbar ROM by at least 3 degrees from the initial ROM value with improvements noted in functional ROM and ability to perform ADLs. Appropriate and Ongoing Met 6/19/23  - Pt will demonstrate increased MedX average isometric strength value by 20% from initial test resulting in improved ability to perform bending, lifting, and carrying activities safely, confidently. Appropriate and Ongoing Met 6/19/23  - Pt will report a reduction in worst pain score by 1-2 points for improved tolerance for bending, rising from sitting. Appropriate and Ongoing Met 6/19/23  - Pt able to perform HEP correctly with minimal cueing or supervision from therapist to encourage independent management of symptoms. Appropriate and Ongoing Met 6/19/23     Long term goals: 10 weeks or 20 visits   - Pt will demonstrate increased lumbar ROM by at least 6 degrees from initial ROM value, resulting in improved ability to perform functional forward bending while standing and sitting. Appropriate and Ongoing Progressing  - Pt will demonstrate increased MedX average isometric strength value by 30% from initial test resulting in improved ability to perform bending, lifting, and carrying activities safely and confidently. Appropriate and Ongoing Met 6/19/23  - Pt  to demonstrate ability to independently control and reduce their pain through posture positioning and mechanical movements throughout a typical day. Appropriate and Ongoing Progressing  - Pt will demonstrate reduced pain and improved functional outcomes as reported on the FOTO by reaching a limitation score of < or = 18% or less in order to demonstrate subjective improvement in pt's condition.   Appropriate and Ongoing Met 6/19/23  - Pt will demonstrate independence with the HEP at discharge. Appropriate and Ongoing  - Pt will be able to resolve and manage pain (patient goal) Appropriate and Ongoing    Plan     Continue with established Plan of Care towards established PT goals.   Outpatient physical therapy 2x week for 10 weeks or 20 visits to include the following:   - Patient education  - Therapeutic exercise  - Manual therapy  - Performance testing   - Neuromuscular Re-education  - Therapeutic activity   - Modalities    ds  Therapist: Arabella Childress, PT  6/26/2023

## 2023-06-29 ENCOUNTER — CLINICAL SUPPORT (OUTPATIENT)
Dept: REHABILITATION | Facility: HOSPITAL | Age: 73
End: 2023-06-29
Payer: MEDICARE

## 2023-06-29 DIAGNOSIS — G89.29 CHRONIC LEFT-SIDED LOW BACK PAIN WITHOUT SCIATICA: Primary | ICD-10-CM

## 2023-06-29 DIAGNOSIS — M54.50 CHRONIC LEFT-SIDED LOW BACK PAIN WITHOUT SCIATICA: Primary | ICD-10-CM

## 2023-06-29 DIAGNOSIS — R29.898 DECREASED STRENGTH OF TRUNK AND BACK: ICD-10-CM

## 2023-06-29 PROCEDURE — 97112 NEUROMUSCULAR REEDUCATION: CPT | Mod: PO | Performed by: PHYSICAL THERAPIST

## 2023-06-29 PROCEDURE — 97110 THERAPEUTIC EXERCISES: CPT | Mod: PO | Performed by: PHYSICAL THERAPIST

## 2023-06-29 NOTE — PROGRESS NOTES
[facility]  Physical Therapy Treatment Note     Name: Rosalia Peraza [place]  Clinic Number: [id number]    Therapy Diagnosis:   Encounter Diagnoses   Name Primary?    Chronic left-sided low back pain without sciatica Yes    Decreased strength of trunk and back          Physician: Dirk Alvarado PA-C    Visit Date: 2023  Physician Orders: PT Eval and Treat Healthy [facility]  Medical Diagnosis from Referral: lumbar spondylosis, lumbar radiculitis  Evaluation Date: 2023  Authorization Period Expiration: 23  Plan of Care Expiration: 2023  Reassessment: 23  Reassessment Due: 23  Visit # / Visits authorized:  (+1 prior auth)     Time In: 10:00AM  Time Out: 11:00AM  Total Billable Time: 50 minutes  INSURANCE and OUTCOMES: Value Based Insurance with FOTO Outcomes 1/3     Precautions: standard     Pattern of pain determined: Pattern 1 PEP    Subjective     Rosalia reports she had best day yesterday that she has had so far. Currently having only left low back soreness.Worse by end of day.  Patient reports tolerating previous visit with decreased pain  Patient reports their pain to be 2/10 on a 0-10 scale with 0 being no pain and 10 being the worst pain imaginable.  Pain Location: L lower back     Work and leisure: Occupation: retired  Leisure: golf, gardening, but not doing because of back      Pt goals: no pain     Objective      Baseline IM Testing Results:   Date of testin23  ROM    0-57 deg 0-54 deg   Max Peak Torque 56 48    Min Peak Torque 46 18    Flex/Ext Ratio 1.2:1 2.6:1   % below normative data 44 63         Outcomes:  Limitation Score: 22%   Visit 5 Score:  20%  Visit 10 Score: 18%  Discharge Score:     Treatment     Rosalia Peraza received the treatments listed below:        Rosalia Peraza received neuromuscular education for 15 minutes via participation on the [employer] Machine. Therapist  assisted patient in isolating and engaging spinal stabilization musculature in order to improve functional ability and postural control. Patient performed exercise with therapist guidance in order to accurately use pacer function, avoid valsalva, and optimally exert effort within a safe and effective range via the Carlee Exertion Rating Scale. Patient instructed to perform at a midrange of exertion and to complete 15-20 repetitions within appropriate split time, with proper technique, and while maintaining safety.     [facility] 6/29/2023   Visit Number 13   VAS Pain Rating 2   Treadmill Time (in min.) 10   Speed 2.5   Extension in Lying 20   Extension in Standing 10   Lumbar Extension Seat Pad -   Femur Restraint -   Top Dead Center -   Counterweight -   Lumbar Flexion -   Lumbar Extension -   Lumbar Peak Torque -   Min Torque -   Test Percent Below Normative Data -   Test Percent Gain in Strength from Initial  -   Lumbar Weight 42   Repetitions 20   Rating of Perceived Exertion 5   Ice - Z Lie (in min.) 10           Rosalia Peraza participated in therapeutic exercises to develop strength, endurance, ROM, flexibility, posture, and core stabilization for 35 minutes including:    Prone on elbows 2 min  Press ups 2/10  Bridging 2/10  Standing extension x10          Peripheral muscle strengthening which included one set of 15-20 repetitions at a slow and controlled 10-13 second per rep pace focused on strengthening supporting musculature in order to improve body mechanics and functional mobility. Patient and therapist focused on proper form during treatment to ensure optimal strengthening of each targeted muscle group.  Machines utilized include torso rotation, leg extension, leg curl, chest press, upright row. Tricep extension, bicep curl, leg press, and hip abduction added visit 3    Rosalia Peraza participated in dynamic functional therapeutic activities to improve functional performance and simulate household  and community activities for 05 minutes. The following activities were included:        Pt given cold pack for 10 minutes to back in supine.    Patient Education and Home Exercises     Home exercises include:  on eval  Cardio program (V5): - 6/2/23  Lifting education (V11): -6/23/23  Posture/Lumbar roll: upcoming  Fridge Magnet Discharge handout (date given): -  Equipment at home/gym membership:     Education provided:   - PT role and POC  - HEP  - progression of Healthy Back Program    Written Home Exercises Provided: Patient instructed to cont prior HEP.  Exercises were reviewed and Rosalia Peraza was able to demonstrate them prior to the end of the session.  Rosalia Peraza demonstrated good  understanding of the education provided.     See EMR under Patient Instructions for exercises provided prior visit.    Assessment     Pt excited about how good she felt yesterday. Overall aware of improvement. Becoming more aware of posture. Much improved lumbar extension and arm strength with press ups. Progressed with resistance on MEDX and several peripheral machines. Most exercises and activities 4-5/10 perceived exertion, but without increase of pain. Consistently reports decreased pain following each session.    Reassessment 6/19/23:  Patient has attended 10 visits at [facility] which included MD evaluation, PT evaluation with isometric testing, and physical therapy treatment including HEP instruction, education, aerobic activity, dynamic strengthening on MedX equipment for the spine, and whole body strengthening on MedX equipment with increasing resistance. Patient demonstrates increased ability to reduce symptoms, improve posture, improve ROM, and improve strength, as stated below:    -Improved posture, she is using lumbar roll  -Improved lumbar ROM, 0-54 degrees initially on MedX test and 0-57 degrees currently.  -Improved strength at each test point on lumbar MedX isometric test with 59 % average improvement  noted with reduced pain noted by patient.  -Initial outcome tool score of 22% and current outcome tool score of 18% indicating reduced pain and improved function.        Patient is making good progress towards established goals.  Pt will continue to benefit from skilled outpatient physical therapy to address the deficits stated in the impairment chart, provide pt/family education and to maximize pt's level of independence in the home and community environment.     Anticipated Barriers for therapy: none  Pt's spiritual, cultural and educational needs considered and pt agreeable to plan of care and goals as stated below:     Goals:   Short term goals:  6 weeks or 10 visits   - Pt will demonstrate increased lumbar ROM by at least 3 degrees from the initial ROM value with improvements noted in functional ROM and ability to perform ADLs. Appropriate and Ongoing Met 6/19/23  - Pt will demonstrate increased MedX average isometric strength value by 20% from initial test resulting in improved ability to perform bending, lifting, and carrying activities safely, confidently. Appropriate and Ongoing Met 6/19/23  - Pt will report a reduction in worst pain score by 1-2 points for improved tolerance for bending, rising from sitting. Appropriate and Ongoing Met 6/19/23  - Pt able to perform HEP correctly with minimal cueing or supervision from therapist to encourage independent management of symptoms. Appropriate and Ongoing Met 6/19/23     Long term goals: 10 weeks or 20 visits   - Pt will demonstrate increased lumbar ROM by at least 6 degrees from initial ROM value, resulting in improved ability to perform functional forward bending while standing and sitting. Appropriate and Ongoing Progressing  - Pt will demonstrate increased MedX average isometric strength value by 30% from initial test resulting in improved ability to perform bending, lifting, and carrying activities safely and confidently. Appropriate and Ongoing Met  6/19/23  - Pt to demonstrate ability to independently control and reduce their pain through posture positioning and mechanical movements throughout a typical day. Appropriate and Ongoing Progressing  - Pt will demonstrate reduced pain and improved functional outcomes as reported on the FOTO by reaching a limitation score of < or = 18% or less in order to demonstrate subjective improvement in pt's condition.   Appropriate and Ongoing Met 6/19/23  - Pt will demonstrate independence with the HEP at discharge. Appropriate and Ongoing  - Pt will be able to resolve and manage pain (patient goal) Appropriate and Ongoing    Plan     Continue with established Plan of Care towards established PT goals.   Outpatient physical therapy 2x week for 10 weeks or 20 visits to include the following:   - Patient education  - Therapeutic exercise  - Manual therapy  - Performance testing   - Neuromuscular Re-education  - Therapeutic activity   - Modalities    ds  Therapist: Arabella Childress, PT  6/29/2023

## 2023-07-06 ENCOUNTER — CLINICAL SUPPORT (OUTPATIENT)
Dept: REHABILITATION | Facility: HOSPITAL | Age: 73
End: 2023-07-06
Payer: MEDICARE

## 2023-07-06 DIAGNOSIS — R29.898 DECREASED STRENGTH OF TRUNK AND BACK: ICD-10-CM

## 2023-07-06 DIAGNOSIS — G89.29 CHRONIC LEFT-SIDED LOW BACK PAIN WITHOUT SCIATICA: Primary | ICD-10-CM

## 2023-07-06 DIAGNOSIS — M54.50 CHRONIC LEFT-SIDED LOW BACK PAIN WITHOUT SCIATICA: Primary | ICD-10-CM

## 2023-07-06 PROCEDURE — 97112 NEUROMUSCULAR REEDUCATION: CPT | Mod: PO,CQ

## 2023-07-06 PROCEDURE — 97110 THERAPEUTIC EXERCISES: CPT | Mod: PO,CQ

## 2023-07-06 NOTE — PROGRESS NOTES
[facility]PAT[facility]ERAP[facility] AND [facility]LL[facility] - [facility]  Physical Therapy Treatment Note     Name: Rosalia Peraza [place]  Clinic Number: [id number]    Therapy Diagnosis:   Encounter Diagnoses   Name Primary?    Chronic left-sided low back pain without sciatica Yes    Decreased strength of trunk and back            Physician: Dirk Alvarado PA-C    Visit Date: 2023  Physician Orders: PT Eval and Treat Healthy Back  Medical Diagnosis from Referral: lumbar spondylosis, lumbar radiculitis  Evaluation Date: 2023  Authorization Period Expiration: 23  Plan of Care Expiration: 2023  Reassessment: 23  Reassessment Due: 23  Visit # / Visits authorized:  (+1 prior auth)     Time In: 11:30AM  Time Out: 12:30pm  Total Billable Time: 50 minutes  INSURANCE and OUTCOMES: Value Based Insurance with FOTO Outcomes 1/3     Precautions: standard     Pattern of pain determined: Pattern 1 PEP    Subjective     Rosalia reports she is still having L LBP, but does decrease with HEP.   Patient reports tolerating previous visit with decreased pain  Patient reports their pain to be 3/10 on a 0-10 scale with 0 being no pain and 10 being the worst pain imaginable.  Pain Location: L lower back     Work and leisure: Occupation: retired  Leisure: golf, gardening, but not doing because of back      Pt goals: no pain     Objective      Baseline IM Testing Results:   Date of testin23  ROM    0-57 deg 0-54 deg   Max Peak Torque 56 48    Min Peak Torque 46 18    Flex/Ext Ratio 1.2:1 2.6:1   % below normative data 44 63         Outcomes:  Limitation Score: 22%   Visit 5 Score:  20%  Visit 10 Score: 18%  Discharge Score:     Treatment     Rosalia Peraza received the treatments listed below:        Rosalia Peraza received neuromuscular education for 15 minutes via participation on the [employer] Machine. Therapist assisted patient in isolating and engaging spinal stabilization  musculature in order to improve functional ability and postural control. Patient performed exercise with therapist guidance in order to accurately use pacer function, avoid valsalva, and optimally exert effort within a safe and effective range via the Carlee Exertion Rating Scale. Patient instructed to perform at a midrange of exertion and to complete 15-20 repetitions within appropriate split time, with proper technique, and while maintaining safety.         Rosalia Peraza participated in therapeutic exercises to develop strength, endurance, ROM, flexibility, posture, and core stabilization for 35 minutes including:    Prone on elbows 2 min  Press ups 2/10  Bridging 2/10  Standing extension x10        [facility] 7/6/2023   Visit Number 14   VAS Pain Rating 2   Treadmill Time (in min.) 10   Speed 2.5   Extension in Lying 20   Extension in Standing 10   Lumbar Extension Seat Pad -   Femur Restraint -   Top Dead Center -   Counterweight -   Lumbar Flexion -   Lumbar Extension -   Lumbar Peak Torque -   Min Torque -   Test Percent Below Normative Data -   Test Percent Gain in Strength from Initial  -   Lumbar Weight 45   Repetitions 15   Rating of Perceived Exertion 5   Ice - Z Lie (in min.) 10      Peripheral muscle strengthening which included one set of 15-20 repetitions at a slow and controlled 10-13 second per rep pace focused on strengthening supporting musculature in order to improve body mechanics and functional mobility. Patient and therapist focused on proper form during treatment to ensure optimal strengthening of each targeted muscle group.  Machines utilized include torso rotation, leg extension, leg curl, chest press, upright row. Tricep extension, bicep curl, leg press, and hip abduction added visit 3    Rosalia Peraza participated in dynamic functional therapeutic activities to improve functional performance and simulate household and community activities for 05 minutes. The following activities  were included:        Pt given cold pack for 10 minutes to back in supine.    Patient Education and Home Exercises     Home exercises include:  on eval  Cardio program (V5): - 6/2/23  Lifting education (V11): -6/23/23  Posture/Lumbar roll: upcoming  Fridge Magnet Discharge handout (date given): -  Equipment at home/gym membership:     Education provided:   - PT role and POC  - HEP  - progression of Healthy Back Program    Written Home Exercises Provided: Patient instructed to cont prior HEP.  Exercises were reviewed and Rosalia Peraza was able to demonstrate them prior to the end of the session.  Rosalia Peraza demonstrated good  understanding of the education provided.     See EMR under Patient Instructions for exercises provided prior visit.    Assessment     Pt with less LBP post extension based stretch. Progressed with resistance on MEDX and several peripheral machines. Most exercises and activities 4-5/10 perceived exertion, but without increase of pain. Pt complaint with her HEP daily.     Reassessment 6/19/23:  Patient has attended 10 visits at [facility] which included MD evaluation, PT evaluation with isometric testing, and physical therapy treatment including HEP instruction, education, aerobic activity, dynamic strengthening on MedX equipment for the spine, and whole body strengthening on MedX equipment with increasing resistance. Patient demonstrates increased ability to reduce symptoms, improve posture, improve ROM, and improve strength, as stated below:    -Improved posture, she is using lumbar roll  -Improved lumbar ROM, 0-54 degrees initially on MedX test and 0-57 degrees currently.  -Improved strength at each test point on lumbar MedX isometric test with 59 % average improvement noted with reduced pain noted by patient.  -Initial outcome tool score of 22% and current outcome tool score of 18% indicating reduced pain and improved function.        Patient is making good progress towards  established goals.  Pt will continue to benefit from skilled outpatient physical therapy to address the deficits stated in the impairment chart, provide pt/family education and to maximize pt's level of independence in the home and community environment.     Anticipated Barriers for therapy: none  Pt's spiritual, cultural and educational needs considered and pt agreeable to plan of care and goals as stated below:     Goals:   Short term goals:  6 weeks or 10 visits   - Pt will demonstrate increased lumbar ROM by at least 3 degrees from the initial ROM value with improvements noted in functional ROM and ability to perform ADLs. Appropriate and Ongoing Met 6/19/23  - Pt will demonstrate increased MedX average isometric strength value by 20% from initial test resulting in improved ability to perform bending, lifting, and carrying activities safely, confidently. Appropriate and Ongoing Met 6/19/23  - Pt will report a reduction in worst pain score by 1-2 points for improved tolerance for bending, rising from sitting. Appropriate and Ongoing Met 6/19/23  - Pt able to perform HEP correctly with minimal cueing or supervision from therapist to encourage independent management of symptoms. Appropriate and Ongoing Met 6/19/23     Long term goals: 10 weeks or 20 visits   - Pt will demonstrate increased lumbar ROM by at least 6 degrees from initial ROM value, resulting in improved ability to perform functional forward bending while standing and sitting. Appropriate and Ongoing Progressing  - Pt will demonstrate increased MedX average isometric strength value by 30% from initial test resulting in improved ability to perform bending, lifting, and carrying activities safely and confidently. Appropriate and Ongoing Met 6/19/23  - Pt to demonstrate ability to independently control and reduce their pain through posture positioning and mechanical movements throughout a typical day. Appropriate and Ongoing Progressing  - Pt will  demonstrate reduced pain and improved functional outcomes as reported on the FOTO by reaching a limitation score of < or = 18% or less in order to demonstrate subjective improvement in pt's condition.   Appropriate and Ongoing Met 6/19/23  - Pt will demonstrate independence with the HEP at discharge. Appropriate and Ongoing  - Pt will be able to resolve and manage pain (patient goal) Appropriate and Ongoing    Plan     Continue with established Plan of Care towards established PT goals.   Outpatient physical therapy 2x week for 10 weeks or 20 visits to include the following:   - Patient education  - Therapeutic exercise  - Manual therapy  - Performance testing   - Neuromuscular Re-education  - Therapeutic activity   - Modalities    ds  Therapist: Maddy Mcclendon, PTA  7/6/2023

## 2023-07-10 ENCOUNTER — CLINICAL SUPPORT (OUTPATIENT)
Dept: REHABILITATION | Facility: HOSPITAL | Age: 73
End: 2023-07-10
Payer: MEDICARE

## 2023-07-10 DIAGNOSIS — R29.898 DECREASED STRENGTH OF TRUNK AND BACK: ICD-10-CM

## 2023-07-10 DIAGNOSIS — M54.50 CHRONIC LEFT-SIDED LOW BACK PAIN WITHOUT SCIATICA: Primary | ICD-10-CM

## 2023-07-10 DIAGNOSIS — G89.29 CHRONIC LEFT-SIDED LOW BACK PAIN WITHOUT SCIATICA: Primary | ICD-10-CM

## 2023-07-10 PROCEDURE — 97110 THERAPEUTIC EXERCISES: CPT | Mod: PO | Performed by: PHYSICAL THERAPIST

## 2023-07-10 PROCEDURE — 97112 NEUROMUSCULAR REEDUCATION: CPT | Mod: PO | Performed by: PHYSICAL THERAPIST

## 2023-07-10 NOTE — PROGRESS NOTES
[facility]  Physical Therapy Treatment Note     Name: Rosalia Peraza [place]  Clinic Number: [id number]    Therapy Diagnosis:   Encounter Diagnoses   Name Primary?    Chronic left-sided low back pain without sciatica Yes    Decreased strength of trunk and back            Physician: Dirk Alvarado PA-C    Visit Date: 7/10/2023  Physician Orders: PT Eval and Treat Healthy [facility]  Medical Diagnosis from Referral: lumbar spondylosis, lumbar radiculitis  Evaluation Date: 2023  Authorization Period Expiration: 23  Plan of Care Expiration: 2023  Reassessment: 23  Reassessment Due: 23  Visit # / Visits authorized:  (+1 prior auth)     Time In: 11:25AM  Time Out: 12:25pm  Total Billable Time: 50 minutes  INSURANCE and OUTCOMES: Value Based Insurance with FOTO Outcomes 1/3     Precautions: standard     Pattern of pain determined: Pattern 1 PEP    Subjective     Rosalia reports she awakened this morning without pain. She reports decreased pain with use of lumbar support with sitting.  Patient reports tolerating previous visit with decreased pain  Patient reports their pain to be 2/10 on a 0-10 scale with 0 being no pain and 10 being the worst pain imaginable.  Pain Location: L lower back     Work and leisure: Occupation: retired  Leisure: golf, gardening, but not doing because of back      Pt goals: no pain     Objective      Baseline IM Testing Results:   Date of testin23  ROM    0-57 deg 0-54 deg   Max Peak Torque 56 48    Min Peak Torque 46 18    Flex/Ext Ratio 1.2:1 2.6:1   % below normative data 44 63         Outcomes:  Limitation Score: 22%   Visit 5 Score:  20%  Visit 10 Score: 18%  Discharge Score:     Treatment     Rosalia Peraza received the treatments listed below:        Rosalia Peraza received neuromuscular education for 15 minutes via participation on the [employer] Machine. Therapist assisted  patient in isolating and engaging spinal stabilization musculature in order to improve functional ability and postural control. Patient performed exercise with therapist guidance in order to accurately use pacer function, avoid valsalva, and optimally exert effort within a safe and effective range via the Carlee Exertion Rating Scale. Patient instructed to perform at a midrange of exertion and to complete 15-20 repetitions within appropriate split time, with proper technique, and while maintaining safety.         Rosalia Peraza participated in therapeutic exercises to develop strength, endurance, ROM, flexibility, posture, and core stabilization for 35 minutes including:    Prone on elbows 2 min  Press ups 2/10  Bridging 2/10  Standing extension x10        [facility] 7/10/2023   Visit Number 15   VAS Pain Rating 2   Treadmill Time (in min.) 10   Speed 2.5   Extension in Lying 20   Extension in Standing 10   Lumbar Extension Seat Pad -   Femur Restraint -   Top Dead Center -   Counterweight -   Lumbar Flexion -   Lumbar Extension -   Lumbar Peak Torque -   Min Torque -   Test Percent Below Normative Data -   Test Percent Gain in Strength from Initial  -   Lumbar Weight 45   Repetitions 15   Rating of Perceived Exertion 5   Ice - Z Lie (in min.) 10        Peripheral muscle strengthening which included one set of 15-20 repetitions at a slow and controlled 10-13 second per rep pace focused on strengthening supporting musculature in order to improve body mechanics and functional mobility. Patient and therapist focused on proper form during treatment to ensure optimal strengthening of each targeted muscle group.  Machines utilized include torso rotation, leg extension, leg curl, chest press, upright row. Tricep extension, bicep curl, leg press, and hip abduction added visit 3    Rosalia Peraza participated in dynamic functional therapeutic activities to improve functional performance and simulate household and  community activities for 05 minutes. The following activities were included:        Pt given cold pack for 10 minutes to back in supine.    Patient Education and Home Exercises     Home exercises include:  on eval  Cardio program (V5): - 6/2/23  Lifting education (V11): -6/23/23  Posture/Lumbar roll: upcoming  Fridge Magnet Discharge handout (date given): -  Equipment at home/gym membership:     Education provided:   - PT role and POC  - HEP  - progression of Healthy Back Program    Written Home Exercises Provided: Patient instructed to cont prior HEP.  Exercises were reviewed and Rosalia Peraza was able to demonstrate them prior to the end of the session.  Rosalia Peraza demonstrated good  understanding of the education provided.     See EMR under Patient Instructions for exercises provided prior visit.    Assessment     Pt is now using lumbar support more frequently with sitting and is now aware of benefit. She awakened without pain today which is also an improvement. Continue to reinforce postural awareness with exercise and activity.     Reassessment 6/19/23:  Patient has attended 10 visits at [facility] which included MD evaluation, PT evaluation with isometric testing, and physical therapy treatment including HEP instruction, education, aerobic activity, dynamic strengthening on MedX equipment for the spine, and whole body strengthening on MedX equipment with increasing resistance. Patient demonstrates increased ability to reduce symptoms, improve posture, improve ROM, and improve strength, as stated below:    -Improved posture, she is using lumbar roll  -Improved lumbar ROM, 0-54 degrees initially on MedX test and 0-57 degrees currently.  -Improved strength at each test point on lumbar MedX isometric test with 59 % average improvement noted with reduced pain noted by patient.  -Initial outcome tool score of 22% and current outcome tool score of 18% indicating reduced pain and improved  function.        Patient is making good progress towards established goals.  Pt will continue to benefit from skilled outpatient physical therapy to address the deficits stated in the impairment chart, provide pt/family education and to maximize pt's level of independence in the home and community environment.     Anticipated Barriers for therapy: none  Pt's spiritual, cultural and educational needs considered and pt agreeable to plan of care and goals as stated below:     Goals:   Short term goals:  6 weeks or 10 visits   - Pt will demonstrate increased lumbar ROM by at least 3 degrees from the initial ROM value with improvements noted in functional ROM and ability to perform ADLs. Appropriate and Ongoing Met 6/19/23  - Pt will demonstrate increased MedX average isometric strength value by 20% from initial test resulting in improved ability to perform bending, lifting, and carrying activities safely, confidently. Appropriate and Ongoing Met 6/19/23  - Pt will report a reduction in worst pain score by 1-2 points for improved tolerance for bending, rising from sitting. Appropriate and Ongoing Met 6/19/23  - Pt able to perform HEP correctly with minimal cueing or supervision from therapist to encourage independent management of symptoms. Appropriate and Ongoing Met 6/19/23     Long term goals: 10 weeks or 20 visits   - Pt will demonstrate increased lumbar ROM by at least 6 degrees from initial ROM value, resulting in improved ability to perform functional forward bending while standing and sitting. Appropriate and Ongoing Progressing  - Pt will demonstrate increased MedX average isometric strength value by 30% from initial test resulting in improved ability to perform bending, lifting, and carrying activities safely and confidently. Appropriate and Ongoing Met 6/19/23  - Pt to demonstrate ability to independently control and reduce their pain through posture positioning and mechanical movements throughout a typical  day. Appropriate and Ongoing Progressing  - Pt will demonstrate reduced pain and improved functional outcomes as reported on the FOTO by reaching a limitation score of < or = 18% or less in order to demonstrate subjective improvement in pt's condition.   Appropriate and Ongoing Met 6/19/23  - Pt will demonstrate independence with the HEP at discharge. Appropriate and Ongoing  - Pt will be able to resolve and manage pain (patient goal) Appropriate and Ongoing    Plan     Continue with established Plan of Care towards established PT goals.   Outpatient physical therapy 2x week for 10 weeks or 20 visits to include the following:   - Patient education  - Therapeutic exercise  - Manual therapy  - Performance testing   - Neuromuscular Re-education  - Therapeutic activity   - Modalities    ds  Therapist: Arabella Childress, PT  7/10/2023

## 2023-07-14 ENCOUNTER — CLINICAL SUPPORT (OUTPATIENT)
Dept: REHABILITATION | Facility: HOSPITAL | Age: 73
End: 2023-07-14
Payer: MEDICARE

## 2023-07-14 DIAGNOSIS — M54.50 CHRONIC LEFT-SIDED LOW BACK PAIN WITHOUT SCIATICA: Primary | ICD-10-CM

## 2023-07-14 DIAGNOSIS — G89.29 CHRONIC LEFT-SIDED LOW BACK PAIN WITHOUT SCIATICA: Primary | ICD-10-CM

## 2023-07-14 DIAGNOSIS — R29.898 DECREASED STRENGTH OF TRUNK AND BACK: ICD-10-CM

## 2023-07-14 PROCEDURE — 97110 THERAPEUTIC EXERCISES: CPT | Mod: PO | Performed by: PHYSICAL THERAPIST

## 2023-07-14 PROCEDURE — 97112 NEUROMUSCULAR REEDUCATION: CPT | Mod: PO | Performed by: PHYSICAL THERAPIST

## 2023-07-14 NOTE — PROGRESS NOTES
[facility]  Physical Therapy Treatment Note     Name: Rosalia Peraza [place]  Clinic Number: [id number]    Therapy Diagnosis:   Encounter Diagnoses   Name Primary?    Chronic left-sided low back pain without sciatica Yes    Decreased strength of trunk and back            Physician: Dirk Alvarado PA-C    Visit Date: 2023  Physician Orders: PT Eval and Treat Healthy Back  Medical Diagnosis from Referral: lumbar spondylosis, lumbar radiculitis  Evaluation Date: 2023  Authorization Period Expiration: 23  Plan of Care Expiration: 2023  Reassessment: 23  Reassessment Due: 23  Visit # / Visits authorized: 15/20 (+1 prior auth)     Time In: 9:55AM  Time Out: 10:55am  Total Billable Time: 50 minutes  INSURANCE and OUTCOMES: Value Based Insurance with FOTO Outcomes 1/3     Precautions: standard     Pattern of pain determined: Pattern 1 PEP    Subjective     Rosalia reports she awakened this morning again without pain. She started having very mild pain later. She does report ability to reduce pain or eliminate pain with her exercises. She questioned if she will have to continue with her exercises indefinitely.  Patient reports tolerating previous visit with decreased pain  Patient reports their pain to be 2/10 on a 0-10 scale with 0 being no pain and 10 being the worst pain imaginable.  Pain Location: L lower back     Work and leisure: Occupation: retired  Leisure: golf, gardening, but not doing because of back      Pt goals: no pain     Objective      Baseline IM Testing Results:   Date of testin23  ROM    0-57 deg 0-54 deg   Max Peak Torque 56 48    Min Peak Torque 46 18    Flex/Ext Ratio 1.2:1 2.6:1   % below normative data 44 63         Outcomes:  Limitation Score: 22%   Visit 5 Score:  20%  Visit 10 Score: 18%  Discharge Score:     Treatment     Rosalia Peraza received the treatments listed below:         Rosalia Peraza received neuromuscular education for 15 minutes via participation on the [email or web address] Machine. Therapist assisted patient in isolating and engaging spinal stabilization musculature in order to improve functional ability and postural control. Patient performed exercise with therapist guidance in order to accurately use pacer function, avoid valsalva, and optimally exert effort within a safe and effective range via the Carlee Exertion Rating Scale. Patient instructed to perform at a midrange of exertion and to complete 15-20 repetitions within appropriate split time, with proper technique, and while maintaining safety.         Rosalia Peraza participated in therapeutic exercises to develop strength, endurance, ROM, flexibility, posture, and core stabilization for 35 minutes including:    Prone on elbows 2 min  Press ups 2/10  Bridging 2/10  Standing extension x10        [facility] 7/14/2023   Visit Number 16   VAS Pain Rating 2   Treadmill Time (in min.) 10   Speed 2.5   Extension in Lying 20   Extension in Standing 10   Lumbar Extension Seat Pad -   Femur Restraint -   Top Dead Center -   Counterweight -   Lumbar Flexion -   Lumbar Extension -   Lumbar Peak Torque -   Min Torque -   Test Percent Below Normative Data -   Test Percent Gain in Strength from Initial  -   Lumbar Weight 45   Repetitions 19   Rating of Perceived Exertion 5   Ice - Z Lie (in min.) 10            Peripheral muscle strengthening which included one set of 15-20 repetitions at a slow and controlled 10-13 second per rep pace focused on strengthening supporting musculature in order to improve body mechanics and functional mobility. Patient and therapist focused on proper form during treatment to ensure optimal strengthening of each targeted muscle group.  Machines utilized include torso rotation, leg extension, leg curl, chest press, upright row. Tricep extension, bicep curl, leg press, and hip abduction added visit  3    Rosalia Peraza participated in dynamic functional therapeutic activities to improve functional performance and simulate household and community activities for 05 minutes. The following activities were included:        Pt given cold pack for 10 minutes to back in supine.    Patient Education and Home Exercises     Home exercises include:  on eval  Cardio program (V5): - 6/2/23  Lifting education (V11): -6/23/23  Posture/Lumbar roll: upcoming  Fridge Magnet Discharge handout (date given): -  Equipment at home/gym membership:     Education provided:   - PT role and POC  - HEP  - progression of Healthy Back Program    Written Home Exercises Provided: Patient instructed to cont prior HEP.  Exercises were reviewed and Rosalia Peraza was able to demonstrate them prior to the end of the session.  Rosalia Peraza demonstrated good  understanding of the education provided.     See EMR under Patient Instructions for exercises provided prior visit.    Assessment     Discussed with pt that she will need to continue with her exercises indefinitely if she would like to maintain improvements in pain, function, ROM and strength. Discussed long term plan of wellness after PT. Pt seemed to understand. Increased resistance or reps with exercises and activities with continued rating of perceived exertion to be 5/10.    Reassessment 6/19/23:  Patient has attended 10 visits at [facility] which included MD evaluation, PT evaluation with isometric testing, and physical therapy treatment including HEP instruction, education, aerobic activity, dynamic strengthening on MedX equipment for the spine, and whole body strengthening on MedX equipment with increasing resistance. Patient demonstrates increased ability to reduce symptoms, improve posture, improve ROM, and improve strength, as stated below:    -Improved posture, she is using lumbar roll  -Improved lumbar ROM, 0-54 degrees initially on MedX test and 0-57 degrees  currently.  -Improved strength at each test point on lumbar MedX isometric test with 59 % average improvement noted with reduced pain noted by patient.  -Initial outcome tool score of 22% and current outcome tool score of 18% indicating reduced pain and improved function.        Patient is making good progress towards established goals.  Pt will continue to benefit from skilled outpatient physical therapy to address the deficits stated in the impairment chart, provide pt/family education and to maximize pt's level of independence in the home and community environment.     Anticipated Barriers for therapy: none  Pt's spiritual, cultural and educational needs considered and pt agreeable to plan of care and goals as stated below:     Goals:   Short term goals:  6 weeks or 10 visits   - Pt will demonstrate increased lumbar ROM by at least 3 degrees from the initial ROM value with improvements noted in functional ROM and ability to perform ADLs. Appropriate and Ongoing Met 6/19/23  - Pt will demonstrate increased MedX average isometric strength value by 20% from initial test resulting in improved ability to perform bending, lifting, and carrying activities safely, confidently. Appropriate and Ongoing Met 6/19/23  - Pt will report a reduction in worst pain score by 1-2 points for improved tolerance for bending, rising from sitting. Appropriate and Ongoing Met 6/19/23  - Pt able to perform HEP correctly with minimal cueing or supervision from therapist to encourage independent management of symptoms. Appropriate and Ongoing Met 6/19/23     Long term goals: 10 weeks or 20 visits   - Pt will demonstrate increased lumbar ROM by at least 6 degrees from initial ROM value, resulting in improved ability to perform functional forward bending while standing and sitting. Appropriate and Ongoing Progressing  - Pt will demonstrate increased MedX average isometric strength value by 30% from initial test resulting in improved ability to  perform bending, lifting, and carrying activities safely and confidently. Appropriate and Ongoing Met 6/19/23  - Pt to demonstrate ability to independently control and reduce their pain through posture positioning and mechanical movements throughout a typical day. Appropriate and Ongoing Progressing  - Pt will demonstrate reduced pain and improved functional outcomes as reported on the FOTO by reaching a limitation score of < or = 18% or less in order to demonstrate subjective improvement in pt's condition.   Appropriate and Ongoing Met 6/19/23  - Pt will demonstrate independence with the HEP at discharge. Appropriate and Ongoing  - Pt will be able to resolve and manage pain (patient goal) Appropriate and Ongoing    Plan     Continue with established Plan of Care towards established PT goals.   Outpatient physical therapy 2x week for 10 weeks or 20 visits to include the following:   - Patient education  - Therapeutic exercise  - Manual therapy  - Performance testing   - Neuromuscular Re-education  - Therapeutic activity   - Modalities    ds  Therapist: Arabella Childress, PT  7/14/2023

## 2023-07-17 ENCOUNTER — CLINICAL SUPPORT (OUTPATIENT)
Dept: REHABILITATION | Facility: HOSPITAL | Age: 73
End: 2023-07-17
Payer: MEDICARE

## 2023-07-17 DIAGNOSIS — G89.29 CHRONIC LEFT-SIDED LOW BACK PAIN WITHOUT SCIATICA: Primary | ICD-10-CM

## 2023-07-17 DIAGNOSIS — R29.898 DECREASED STRENGTH OF TRUNK AND BACK: ICD-10-CM

## 2023-07-17 DIAGNOSIS — M54.50 CHRONIC LEFT-SIDED LOW BACK PAIN WITHOUT SCIATICA: Primary | ICD-10-CM

## 2023-07-17 PROCEDURE — 97112 NEUROMUSCULAR REEDUCATION: CPT | Mod: PO | Performed by: PHYSICAL THERAPIST

## 2023-07-17 PROCEDURE — 97110 THERAPEUTIC EXERCISES: CPT | Mod: PO | Performed by: PHYSICAL THERAPIST

## 2023-07-17 NOTE — PROGRESS NOTES
[facility]  Physical Therapy Treatment Note     Name: Rosalia Peraza Monroe  Clinic Number: [id number]    Therapy Diagnosis:   Encounter Diagnoses   Name Primary?    Chronic left-sided low back pain without sciatica Yes    Decreased strength of trunk and back            Physician: Dirk Alvarado PA-C    Visit Date: 2023  Physician Orders: PT Eval and Treat Healthy Back  Medical Diagnosis from Referral: lumbar spondylosis, lumbar radiculitis  Evaluation Date: 2023  Authorization Period Expiration: 23  Plan of Care Expiration: 2023  Reassessment: 23  Reassessment Due: 23  Visit # / Visits authorized:  (+1 prior auth)     Time In: 11:25AM  Time Out: 12:25pm  Total Billable Time: 50 minutes  INSURANCE and OUTCOMES: Value Based Insurance with FOTO Outcomes 1/3     Precautions: standard     Pattern of pain determined: Pattern 1 PEP    Subjective     Rosalia reports she awakened this morning again without pain, but started having some once up and moving around. This is an improvement from prior to PT. Again discussed long term continuation of exercise, posture and body mechanics for maintenance of healthy spine.  Patient reports tolerating previous visit with decreased pain  Patient reports their pain to be 2/10 on a 0-10 scale with 0 being no pain and 10 being the worst pain imaginable.  Pain Location: L lower back     Work and leisure: Occupation: retired  Leisure: golf, gardening, but not doing because of back      Pt goals: no pain     Objective      Baseline IM Testing Results:   Date of testin23  ROM    0-57 deg 0-54 deg   Max Peak Torque 56 48    Min Peak Torque 46 18    Flex/Ext Ratio 1.2:1 2.6:1   % below normative data 44 63         Outcomes:  Limitation Score: 22%   Visit 5 Score:  20%  Visit 10 Score: 18%  Discharge Score:     Treatment     Rosalia Peraza received the treatments listed  below:        Rosalia Peraza received neuromuscular education for 15 minutes via participation on the [employer] Machine. Therapist assisted patient in isolating and engaging spinal stabilization musculature in order to improve functional ability and postural control. Patient performed exercise with therapist guidance in order to accurately use pacer function, avoid valsalva, and optimally exert effort within a safe and effective range via the Carlee Exertion Rating Scale. Patient instructed to perform at a midrange of exertion and to complete 15-20 repetitions within appropriate split time, with proper technique, and while maintaining safety.         Rosalia Peraza participated in therapeutic exercises to develop strength, endurance, ROM, flexibility, posture, and core stabilization for 35 minutes including:    Prone on elbows 2 min  Press ups 2/10  Bridging 2/10  Standing extension x10     [facility] 7/17/2023   Visit Number 17   VAS Pain Rating 2   Treadmill Time (in min.) 10   Speed 2.5   Extension in Lying 20   Extension in Standing 10   Lumbar Extension Seat Pad -   Femur Restraint -   Top Dead Center -   Counterweight -   Lumbar Flexion -   Lumbar Extension -   Lumbar Peak Torque -   Min Torque -   Test Percent Below Normative Data -   Test Percent Gain in Strength from Initial  -   Lumbar Weight 45   Repetitions 20   Rating of Perceived Exertion 5   Ice - Z Lie (in min.) 10          Peripheral muscle strengthening which included one set of 15-20 repetitions at a slow and controlled 10-13 second per rep pace focused on strengthening supporting musculature in order to improve body mechanics and functional mobility. Patient and therapist focused on proper form during treatment to ensure optimal strengthening of each targeted muscle group.  Machines utilized include torso rotation, leg extension, leg curl, chest press, upright row. Tricep extension, bicep curl, leg press, and hip abduction added visit  3    Rosalia Peraaz participated in dynamic functional therapeutic activities to improve functional performance and simulate household and community activities for 00 minutes. The following activities were included:        Pt given cold pack for 10 minutes to back in supine.    Patient Education and Home Exercises     Home exercises include:  on eval  Cardio program (V5): - 6/2/23  Lifting education (V11): -6/23/23  Posture/Lumbar roll: upcoming  Fridge Magnet Discharge handout (date given): -  Equipment at home/gym membership:     Education provided:   - PT role and POC  - HEP  - progression of Healthy Back Program    Written Home Exercises Provided: Patient instructed to cont prior HEP.  Exercises were reviewed and Rosalia Peraza was able to demonstrate them prior to the end of the session.  Rosalia Peraza demonstrated good  understanding of the education provided.     See EMR under Patient Instructions for exercises provided prior visit.    Assessment     Continued to discuss long term management of back pain. Discussed wellness program here or at home. She is doing better with postural awareness and improved body mechanics with bending. Slightly improved perceived exertion on MEDX with 20 reps achieved today.    Reassessment 6/19/23:  Patient has attended 10 visits at [facility] which included MD evaluation, PT evaluation with isometric testing, and physical therapy treatment including HEP instruction, education, aerobic activity, dynamic strengthening on MedX equipment for the spine, and whole body strengthening on MedX equipment with increasing resistance. Patient demonstrates increased ability to reduce symptoms, improve posture, improve ROM, and improve strength, as stated below:    -Improved posture, she is using lumbar roll  -Improved lumbar ROM, 0-54 degrees initially on MedX test and 0-57 degrees currently.  -Improved strength at each test point on lumbar MedX isometric test with 59 % average  improvement noted with reduced pain noted by patient.  -Initial outcome tool score of 22% and current outcome tool score of 18% indicating reduced pain and improved function.        Patient is making good progress towards established goals.  Pt will continue to benefit from skilled outpatient physical therapy to address the deficits stated in the impairment chart, provide pt/family education and to maximize pt's level of independence in the home and community environment.     Anticipated Barriers for therapy: none  Pt's spiritual, cultural and educational needs considered and pt agreeable to plan of care and goals as stated below:     Goals:   Short term goals:  6 weeks or 10 visits   - Pt will demonstrate increased lumbar ROM by at least 3 degrees from the initial ROM value with improvements noted in functional ROM and ability to perform ADLs. Appropriate and Ongoing Met 6/19/23  - Pt will demonstrate increased MedX average isometric strength value by 20% from initial test resulting in improved ability to perform bending, lifting, and carrying activities safely, confidently. Appropriate and Ongoing Met 6/19/23  - Pt will report a reduction in worst pain score by 1-2 points for improved tolerance for bending, rising from sitting. Appropriate and Ongoing Met 6/19/23  - Pt able to perform HEP correctly with minimal cueing or supervision from therapist to encourage independent management of symptoms. Appropriate and Ongoing Met 6/19/23     Long term goals: 10 weeks or 20 visits   - Pt will demonstrate increased lumbar ROM by at least 6 degrees from initial ROM value, resulting in improved ability to perform functional forward bending while standing and sitting. Appropriate and Ongoing Progressing  - Pt will demonstrate increased MedX average isometric strength value by 30% from initial test resulting in improved ability to perform bending, lifting, and carrying activities safely and confidently. Appropriate and Ongoing  Met 6/19/23  - Pt to demonstrate ability to independently control and reduce their pain through posture positioning and mechanical movements throughout a typical day. Appropriate and Ongoing Progressing  - Pt will demonstrate reduced pain and improved functional outcomes as reported on the FOTO by reaching a limitation score of < or = 18% or less in order to demonstrate subjective improvement in pt's condition.   Appropriate and Ongoing Met 6/19/23  - Pt will demonstrate independence with the HEP at discharge. Appropriate and Ongoing  - Pt will be able to resolve and manage pain (patient goal) Appropriate and Ongoing    Plan     Continue with established Plan of Care towards established PT goals.   Outpatient physical therapy 2x week for 10 weeks or 20 visits to include the following:   - Patient education  - Therapeutic exercise  - Manual therapy  - Performance testing   - Neuromuscular Re-education  - Therapeutic activity   - Modalities    ds  Therapist: Arabella Childress, PT  7/17/2023

## 2023-07-20 ENCOUNTER — CLINICAL SUPPORT (OUTPATIENT)
Dept: REHABILITATION | Facility: HOSPITAL | Age: 73
End: 2023-07-20
Payer: MEDICARE

## 2023-07-20 DIAGNOSIS — M54.50 CHRONIC LEFT-SIDED LOW BACK PAIN WITHOUT SCIATICA: Primary | ICD-10-CM

## 2023-07-20 DIAGNOSIS — G89.29 CHRONIC LEFT-SIDED LOW BACK PAIN WITHOUT SCIATICA: Primary | ICD-10-CM

## 2023-07-20 DIAGNOSIS — R29.898 DECREASED STRENGTH OF TRUNK AND BACK: ICD-10-CM

## 2023-07-20 PROCEDURE — 97112 NEUROMUSCULAR REEDUCATION: CPT | Mod: PO | Performed by: PHYSICAL THERAPIST

## 2023-07-20 PROCEDURE — 97110 THERAPEUTIC EXERCISES: CPT | Mod: PO | Performed by: PHYSICAL THERAPIST

## 2023-07-20 NOTE — PATIENT INSTRUCTIONS
"HEALTHY BACK TOOLS        KEEP YOUR SPINE FEELING FINE   HEALTHY HABITS   Do your "GO TO" stretches 2/day   Get a good night's REST   Watch your POSTURE in sitting/standing Drink PLENTY of water   Use a lumbar roll Eat LOTS of fruits & vegetables   GET UP often (walk and/or stretch) Manage your STRESS   Make your workplace IDEAL FOR YOU  Don't smoke   Lift correctly EXERCISE                           WHAT TO DO WHEN SYMPTOMS FLARE UP  Back and neck pain may occasionally flare up.  If you experience a flare   up, remember your tools. Be encouraged, by remembering that flare-ups will   usually pass.   My Tools:    ~Use your "Go To" Stretches/Positions   ~Keep Moving-pain usually gets better if you move  ~Z lie (with or without ice)  10 min several times a day until symptoms reduce  ~Slowly resume normal activities   ~Practice Deep Breathing and Relaxation techniques                                                 MY EXERCISE PLAN  GO TO STRETCHES  2/day (like brushing your teeth) STRENGTHENING  2-3 times/week CARDIO PROGRAM  30-45 min/3-5x/week   Prone on elbows 2 min Gym exercise vs exercise from health  walking   Press ups 2 sets of 10     Bridging 2 sets of 10     Standing extension x10              "

## 2023-07-20 NOTE — PROGRESS NOTES
[facility]  Physical Therapy Treatment Note     Name: Rosalia Peraza Holder  Clinic Number: [id number]    Therapy Diagnosis:   Encounter Diagnoses   Name Primary?    Chronic left-sided low back pain without sciatica Yes    Decreased strength of trunk and back            Physician: Dirk Alvarado PA-C    Visit Date: 2023  Physician Orders: PT Eval and Treat Healthy Back  Medical Diagnosis from Referral: lumbar spondylosis, lumbar radiculitis  Evaluation Date: 2023  Authorization Period Expiration: 23  Plan of Care Expiration: 2023  Reassessment: 23  Reassessment Due: 23  Visit # / Visits authorized:  (+1 prior auth)     Time In: 11:20AM  Time Out: 12:20pm  Total Billable Time: 50 minutes  INSURANCE and OUTCOMES: Value Based Insurance with FOTO Outcomes 1/3     Precautions: standard     Pattern of pain determined: Pattern 1 PEP    Subjective     Rosalia reports she did a lot of activity yesterday without pain until about 5:00 in evening. It is typical for her to feel worse toward end of day. She tried her stretches, showered and used Voltaren gel.  Patient reports tolerating previous visit with decreased pain  Patient reports their pain to be 2/10 on a 0-10 scale with 0 being no pain and 10 being the worst pain imaginable.  Pain Location: L lower back     Work and leisure: Occupation: retired  Leisure: golf, gardening, but not doing because of back      Pt goals: no pain     Objective      Baseline IM Testing Results:   Date of testin23  ROM    0-57 deg 0-54 deg   Max Peak Torque 56 48    Min Peak Torque 46 18    Flex/Ext Ratio 1.2:1 2.6:1   % below normative data 44 63         Outcomes:  Limitation Score: 22%   Visit 5 Score:  20%  Visit 10 Score: 18%  Discharge Score:     Treatment     Rosalia Peraza received the treatments listed below:        Rosalia Peraza received neuromuscular education  for 15 minutes via participation on the [employer] Machine. Therapist assisted patient in isolating and engaging spinal stabilization musculature in order to improve functional ability and postural control. Patient performed exercise with therapist guidance in order to accurately use pacer function, avoid valsalva, and optimally exert effort within a safe and effective range via the Carlee Exertion Rating Scale. Patient instructed to perform at a midrange of exertion and to complete 15-20 repetitions within appropriate split time, with proper technique, and while maintaining safety.         Rosalia Peraza participated in therapeutic exercises to develop strength, endurance, ROM, flexibility, posture, and core stabilization for 35 minutes including:    Prone on elbows 2 min  Press ups 2/10  Bridging 2/10  Standing extension x10     [facility] 7/20/2023   Visit Number 18   VAS Pain Rating 2   Treadmill Time (in min.) 10   Speed 2.5   Extension in Lying 20   Extension in Standing 10   Lumbar Extension Seat Pad -   Femur Restraint -   Top Dead Center -   Counterweight -   Lumbar Flexion -   Lumbar Extension -   Lumbar Peak Torque -   Min Torque -   Test Percent Below Normative Data -   Test Percent Gain in Strength from Initial  -   Lumbar Weight 45   Repetitions 20   Rating of Perceived Exertion 5   Ice - Z Lie (in min.) 10              Peripheral muscle strengthening which included one set of 15-20 repetitions at a slow and controlled 10-13 second per rep pace focused on strengthening supporting musculature in order to improve body mechanics and functional mobility. Patient and therapist focused on proper form during treatment to ensure optimal strengthening of each targeted muscle group.  Machines utilized include torso rotation, leg extension, leg curl, chest press, upright row. Tricep extension, bicep curl, leg press, and hip abduction added visit 3    Rosalia Peraza participated in dynamic functional  therapeutic activities to improve functional performance and simulate household and community activities for 00 minutes. The following activities were included:        Pt given cold pack for 10 minutes to back in supine.    Patient Education and Home Exercises     Home exercises include:  on eval  Cardio program (V5): - 6/2/23  Lifting education (V11): -6/23/23  Posture/Lumbar roll: upcoming  Fridge Magnet Discharge handout (date given): -  Equipment at home/gym membership:     Education provided:   - PT role and POC  - HEP  - progression of Healthy Back Program    Written Home Exercises Provided: Patient instructed to cont prior HEP.  Exercises were reviewed and Rosalia Peraza was able to demonstrate them prior to the end of the session.  Rosalia Peraza demonstrated good  understanding of the education provided.     See EMR under Patient Instructions for exercises provided prior visit.    Assessment     Pt is expressing more positive information with less pain or no pain on awakening and being able to perform functional activities with less or no pain, but consistently reports increase of pain by evening hours. She seems to be able to reduce pain with rest, ice, stretching and Voltaren. Pt continues with 5/10 perceived exertion with nearly all exercises and activities, but with decreased pain after session.    Reassessment 6/19/23:  Patient has attended 10 visits at [facility] which included MD evaluation, PT evaluation with isometric testing, and physical therapy treatment including HEP instruction, education, aerobic activity, dynamic strengthening on MedX equipment for the spine, and whole body strengthening on MedX equipment with increasing resistance. Patient demonstrates increased ability to reduce symptoms, improve posture, improve ROM, and improve strength, as stated below:    -Improved posture, she is using lumbar roll  -Improved lumbar ROM, 0-54 degrees initially on MedX test and 0-57 degrees  currently.  -Improved strength at each test point on lumbar MedX isometric test with 59 % average improvement noted with reduced pain noted by patient.  -Initial outcome tool score of 22% and current outcome tool score of 18% indicating reduced pain and improved function.        Patient is making good progress towards established goals.  Pt will continue to benefit from skilled outpatient physical therapy to address the deficits stated in the impairment chart, provide pt/family education and to maximize pt's level of independence in the home and community environment.     Anticipated Barriers for therapy: none  Pt's spiritual, cultural and educational needs considered and pt agreeable to plan of care and goals as stated below:     Goals:   Short term goals:  6 weeks or 10 visits   - Pt will demonstrate increased lumbar ROM by at least 3 degrees from the initial ROM value with improvements noted in functional ROM and ability to perform ADLs. Appropriate and Ongoing Met 6/19/23  - Pt will demonstrate increased MedX average isometric strength value by 20% from initial test resulting in improved ability to perform bending, lifting, and carrying activities safely, confidently. Appropriate and Ongoing Met 6/19/23  - Pt will report a reduction in worst pain score by 1-2 points for improved tolerance for bending, rising from sitting. Appropriate and Ongoing Met 6/19/23  - Pt able to perform HEP correctly with minimal cueing or supervision from therapist to encourage independent management of symptoms. Appropriate and Ongoing Met 6/19/23     Long term goals: 10 weeks or 20 visits   - Pt will demonstrate increased lumbar ROM by at least 6 degrees from initial ROM value, resulting in improved ability to perform functional forward bending while standing and sitting. Appropriate and Ongoing Progressing  - Pt will demonstrate increased MedX average isometric strength value by 30% from initial test resulting in improved ability to  perform bending, lifting, and carrying activities safely and confidently. Appropriate and Ongoing Met 6/19/23  - Pt to demonstrate ability to independently control and reduce their pain through posture positioning and mechanical movements throughout a typical day. Appropriate and Ongoing Progressing  - Pt will demonstrate reduced pain and improved functional outcomes as reported on the FOTO by reaching a limitation score of < or = 18% or less in order to demonstrate subjective improvement in pt's condition.   Appropriate and Ongoing Met 6/19/23  - Pt will demonstrate independence with the HEP at discharge. Appropriate and Ongoing  - Pt will be able to resolve and manage pain (patient goal) Appropriate and Ongoing    Plan     Continue with established Plan of Care towards established PT goals.   Outpatient physical therapy 2x week for 10 weeks or 20 visits to include the following:   - Patient education  - Therapeutic exercise  - Manual therapy  - Performance testing   - Neuromuscular Re-education  - Therapeutic activity   - Modalities    ds  Therapist: Arabella Childress, PT  7/20/2023

## 2023-07-24 ENCOUNTER — CLINICAL SUPPORT (OUTPATIENT)
Dept: REHABILITATION | Facility: HOSPITAL | Age: 73
End: 2023-07-24
Payer: MEDICARE

## 2023-07-24 DIAGNOSIS — M54.50 CHRONIC LEFT-SIDED LOW BACK PAIN WITHOUT SCIATICA: Primary | ICD-10-CM

## 2023-07-24 DIAGNOSIS — G89.29 CHRONIC LEFT-SIDED LOW BACK PAIN WITHOUT SCIATICA: Primary | ICD-10-CM

## 2023-07-24 DIAGNOSIS — R29.898 DECREASED STRENGTH OF TRUNK AND BACK: ICD-10-CM

## 2023-07-24 PROCEDURE — 97112 NEUROMUSCULAR REEDUCATION: CPT | Mod: PO | Performed by: PHYSICAL THERAPIST

## 2023-07-24 PROCEDURE — 97110 THERAPEUTIC EXERCISES: CPT | Mod: PO | Performed by: PHYSICAL THERAPIST

## 2023-07-24 NOTE — PROGRESS NOTES
[facility]  Physical Therapy Treatment Note     Name: Rosalia Peraza [place]  Clinic Number: [id number]    Therapy Diagnosis:   Encounter Diagnoses   Name Primary?    Chronic left-sided low back pain without sciatica Yes    Decreased strength of trunk and back            Physician: Dirk Alvarado PA-C    Visit Date: 2023  Physician Orders: PT Eval and Treat Healthy Back  Medical Diagnosis from Referral: lumbar spondylosis, lumbar radiculitis  Evaluation Date: 2023  Authorization Period Expiration: 23  Plan of Care Expiration: 2023  Reassessment: 23  Reassessment Due: 23  Visit # / Visits authorized:  (+1 prior auth)     Time In: 11:25AM  Time Out: 12:25pm  Total Billable Time: 50 minutes  INSURANCE and OUTCOMES: Value Based Insurance with FOTO Outcomes 1/3     Precautions: standard     Pattern of pain determined: Pattern 1 PEP    Subjective     Rosalia reports she has minimal discomfort in low back today.  Patient reports tolerating previous visit with decreased pain  Patient reports their pain to be 2/10 on a 0-10 scale with 0 being no pain and 10 being the worst pain imaginable.  Pain Location: L lower back     Work and leisure: Occupation: retired  Leisure: golf, gardening, but not doing because of back      Pt goals: no pain     Objective      Baseline IM Testing Results:   Date of testin23  ROM    0-57 deg 0-54 deg   Max Peak Torque 56 48    Min Peak Torque 46 18    Flex/Ext Ratio 1.2:1 2.6:1   % below normative data 44 63         Outcomes:  Limitation Score: 22%   Visit 5 Score:  20%  Visit 10 Score: 18%  Discharge Score:     Treatment     Rosalia Peraza received the treatments listed below:        Rosalia Peraza received neuromuscular education for 15 minutes via participation on the [employer] Machine. Therapist assisted patient in isolating and engaging spinal stabilization  musculature in order to improve functional ability and postural control. Patient performed exercise with therapist guidance in order to accurately use pacer function, avoid valsalva, and optimally exert effort within a safe and effective range via the Carlee Exertion Rating Scale. Patient instructed to perform at a midrange of exertion and to complete 15-20 repetitions within appropriate split time, with proper technique, and while maintaining safety.         Rosalia Peraza participated in therapeutic exercises to develop strength, endurance, ROM, flexibility, posture, and core stabilization for 35 minutes including:    Prone on elbows 2 min  Press ups 2/10  Bridging 2/10  Standing extension x10     [facility] 7/24/2023   Visit Number 19   VAS Pain Rating 2   Treadmill Time (in min.) 10   Speed 2.5   Extension in Lying 20   Extension in Standing 10   Lumbar Extension Seat Pad -   Femur Restraint -   Top Dead Center -   Counterweight -   Lumbar Flexion -   Lumbar Extension -   Lumbar Peak Torque -   Min Torque -   Test Percent Below Normative Data -   Test Percent Gain in Strength from Initial  -   Lumbar Weight 45   Repetitions 20   Rating of Perceived Exertion 4   Ice - Z Lie (in min.) 10                  Peripheral muscle strengthening which included one set of 15-20 repetitions at a slow and controlled 10-13 second per rep pace focused on strengthening supporting musculature in order to improve body mechanics and functional mobility. Patient and therapist focused on proper form during treatment to ensure optimal strengthening of each targeted muscle group.  Machines utilized include torso rotation, leg extension, leg curl, chest press, upright row. Tricep extension, bicep curl, leg press, and hip abduction added visit 3    Rosalia Peraza participated in dynamic functional therapeutic activities to improve functional performance and simulate household and community activities for 00 minutes. The following  activities were included:        Pt given cold pack for 10 minutes to back in supine.    Patient Education and Home Exercises     Home exercises include:  on eval  Cardio program (V5): - 6/2/23  Lifting education (V11): -6/23/23  Posture/Lumbar roll: using  [employer] Discharge handout (date given): - 7/24/23  Equipment at home/gym membership:     Education provided:   - PT role and POC  - HEP  - progression of Healthy Back Program    Written Home Exercises Provided: Patient instructed to cont prior HEP.  Exercises were reviewed and Rosalia Peraza was able to demonstrate them prior to the end of the session.  Rosalia Peraza demonstrated good  understanding of the education provided.     See EMR under Patient Instructions for exercises provided prior visit.    Assessment     Reviewed Tools to keep her back healthy. Pt expressed understanding. Given handout for reference. Discussed wellness following PT. Will retest isometric strength next visit and transition to wellness. Improved perceived exertion with MEDX today.    Reassessment 6/19/23:  Patient has attended 10 visits at [facility] which included MD evaluation, PT evaluation with isometric testing, and physical therapy treatment including HEP instruction, education, aerobic activity, dynamic strengthening on MedX equipment for the spine, and whole body strengthening on MedX equipment with increasing resistance. Patient demonstrates increased ability to reduce symptoms, improve posture, improve ROM, and improve strength, as stated below:    -Improved posture, she is using lumbar roll  -Improved lumbar ROM, 0-54 degrees initially on MedX test and 0-57 degrees currently.  -Improved strength at each test point on lumbar MedX isometric test with 59 % average improvement noted with reduced pain noted by patient.  -Initial outcome tool score of 22% and current outcome tool score of 18% indicating reduced pain and improved function.        Patient is making  good progress towards established goals.  Pt will continue to benefit from skilled outpatient physical therapy to address the deficits stated in the impairment chart, provide pt/family education and to maximize pt's level of independence in the home and community environment.     Anticipated Barriers for therapy: none  Pt's spiritual, cultural and educational needs considered and pt agreeable to plan of care and goals as stated below:     Goals:   Short term goals:  6 weeks or 10 visits   - Pt will demonstrate increased lumbar ROM by at least 3 degrees from the initial ROM value with improvements noted in functional ROM and ability to perform ADLs. Appropriate and Ongoing Met 6/19/23  - Pt will demonstrate increased MedX average isometric strength value by 20% from initial test resulting in improved ability to perform bending, lifting, and carrying activities safely, confidently. Appropriate and Ongoing Met 6/19/23  - Pt will report a reduction in worst pain score by 1-2 points for improved tolerance for bending, rising from sitting. Appropriate and Ongoing Met 6/19/23  - Pt able to perform HEP correctly with minimal cueing or supervision from therapist to encourage independent management of symptoms. Appropriate and Ongoing Met 6/19/23     Long term goals: 10 weeks or 20 visits   - Pt will demonstrate increased lumbar ROM by at least 6 degrees from initial ROM value, resulting in improved ability to perform functional forward bending while standing and sitting. Appropriate and Ongoing Progressing  - Pt will demonstrate increased MedX average isometric strength value by 30% from initial test resulting in improved ability to perform bending, lifting, and carrying activities safely and confidently. Appropriate and Ongoing Met 6/19/23  - Pt to demonstrate ability to independently control and reduce their pain through posture positioning and mechanical movements throughout a typical day. Appropriate and Ongoing  Progressing  - Pt will demonstrate reduced pain and improved functional outcomes as reported on the FOTO by reaching a limitation score of < or = 18% or less in order to demonstrate subjective improvement in pt's condition.   Appropriate and Ongoing Met 6/19/23  - Pt will demonstrate independence with the HEP at discharge. Appropriate and Ongoing  - Pt will be able to resolve and manage pain (patient goal) Appropriate and Ongoing    Plan     Continue with established Plan of Care towards established PT goals.   Outpatient physical therapy 2x week for 10 weeks or 20 visits to include the following:   - Patient education  - Therapeutic exercise  - Manual therapy  - Performance testing   - Neuromuscular Re-education  - Therapeutic activity   - Modalities    ds  Therapist: Arabella Childress, PT  7/24/2023

## 2023-07-27 ENCOUNTER — CLINICAL SUPPORT (OUTPATIENT)
Dept: REHABILITATION | Facility: HOSPITAL | Age: 73
End: 2023-07-27
Payer: MEDICARE

## 2023-07-27 DIAGNOSIS — R29.898 DECREASED STRENGTH OF TRUNK AND BACK: ICD-10-CM

## 2023-07-27 DIAGNOSIS — M54.50 CHRONIC LEFT-SIDED LOW BACK PAIN WITHOUT SCIATICA: Primary | ICD-10-CM

## 2023-07-27 DIAGNOSIS — G89.29 CHRONIC LEFT-SIDED LOW BACK PAIN WITHOUT SCIATICA: Primary | ICD-10-CM

## 2023-07-27 PROCEDURE — 97750 PHYSICAL PERFORMANCE TEST: CPT | Mod: PO | Performed by: PHYSICAL THERAPIST

## 2023-07-27 PROCEDURE — 97110 THERAPEUTIC EXERCISES: CPT | Mod: PO | Performed by: PHYSICAL THERAPIST

## 2023-07-27 NOTE — PROGRESS NOTES
[facility]  Physical Therapy Treatment Note     Name: Rosalia Peraza [place]  Clinic Number: [id number]    Therapy Diagnosis:   Encounter Diagnoses   Name Primary?    Chronic left-sided low back pain without sciatica Yes    Decreased strength of trunk and back            Physician: Dirk Alvarado PA-C    Visit Date: 2023  Physician Orders: PT Eval and Treat Healthy Back  Medical Diagnosis from Referral: lumbar spondylosis, lumbar radiculitis  Evaluation Date: 2023  Authorization Period Expiration: 23  Plan of Care Expiration: 2023  Reassessment: 23  Reassessment Due: 23  Visit # / Visits authorized:  (+1 prior auth)     Time In: 11:25AM  Time Out: 12:30pm  Total Billable Time: 55 minutes  INSURANCE and OUTCOMES: Value Based Insurance with FOTO Outcomes 1/3     Precautions: standard     Pattern of pain determined: Pattern 1 PEP    Subjective     Rosalia reports she awakened without pain this morning. Yesterday she did not have to take Ibuprofen at 4:00 and stop to exercise. Minimal discomfort currently.  Patient reports tolerating previous visit with decreased pain  Patient reports their pain to be 1-2/10 on a 0-10 scale with 0 being no pain and 10 being the worst pain imaginable.  Pain Location: L lower back     Work and leisure: Occupation: retired  Leisure: golf, gardening, but not doing because of back      Pt goals: no pain     Objective      Baseline IM Testing Results:   Date of testin23  ROM    0-57 deg 0-54 deg   Max Peak Torque 56 48    Min Peak Torque 46 18    Flex/Ext Ratio 1.2:1 2.6:1   % below normative data 44 63             Outcomes:  Limitation Score: 22%   Visit 5 Score:  20%  Visit 10 Score: 18%  Discharge Score: 20%    Treatment     Rosalia Peraza received the treatments listed below:          Rosalia was seen for performance testing for spinal musculature  to engage spinal  musculature correctly for isometric testing within patient's range of motion, with comparison to baseline testing for  15 minutes.  This includes the MedX practice exercise component and practice and standard testing.  Med x dynamic exercise and testing performed with instructions to guide the patient safely through the isometric testing.  Patient informed to perform isometric test correctly, and safely, building best force they safely can and not pushing through pain.  Patient demonstrated understanding of information       Rosalia Peraza received neuromuscular education for 00 minutes via participation on the Medical MedX Machine. Therapist assisted patient in isolating and engaging spinal stabilization musculature in order to improve functional ability and postural control. Patient performed exercise with therapist guidance in order to accurately use pacer function, avoid valsalva, and optimally exert effort within a safe and effective range via the Carlee Exertion Rating Scale. Patient instructed to perform at a midrange of exertion and to complete 15-20 repetitions within appropriate split time, with proper technique, and while maintaining safety.         Rosalia Peraza participated in therapeutic exercises to develop strength, endurance, ROM, flexibility, posture, and core stabilization for 35 minutes including:    Prone on elbows 2 min  Press ups 2/10  Bridging 2/10  Standing extension x10     [facility] 7/27/2023   Visit Number 20   VAS Pain Rating 1   Treadmill Time (in min.) 10   Speed 2.5   Extension in Lying 20   Extension in Standing 10   Lumbar Extension Seat Pad -   Femur Restraint -   Top Dead Center -   Counterweight -   Lumbar Flexion 60   Lumbar Extension 0   Lumbar Peak Torque 57   Min Torque 43   Test Percent Below Normative Data 50   Test Percent Gain in Strength from Initial  43   Lumbar Weight -   Repetitions -   Rating of Perceived Exertion -   Ice - Z Lie (in min.) 10                       Peripheral muscle strengthening which included one set of 15-20 repetitions at a slow and controlled 10-13 second per rep pace focused on strengthening supporting musculature in order to improve body mechanics and functional mobility. Patient and therapist focused on proper form during treatment to ensure optimal strengthening of each targeted muscle group.  Machines utilized include torso rotation, leg extension, leg curl, chest press, upright row. Tricep extension, bicep curl, leg press, and hip abduction added visit 3    Rosalia Peraza participated in dynamic functional therapeutic activities to improve functional performance and simulate household and community activities for 00 minutes. The following activities were included:        Pt given cold pack for 10 minutes to back in supine.    Patient Education and Home Exercises     Home exercises include:  on eval  Cardio program (V5): - 6/2/23  Lifting education (V11): -6/23/23  Posture/Lumbar roll: using  [email or web address] Discharge handout (date given): - 7/24/23  Equipment at home/gym membership:     Education provided:   - PT role and POC  - HEP  - progression of Healthy [facility] Program    Written Home Exercises Provided: Patient instructed to cont prior HEP.  Exercises were reviewed and Rosalia Peraza was able to demonstrate them prior to the end of the session.  Rosalia Peraza demonstrated good  understanding of the education provided.     See EMR under Patient Instructions for exercises provided prior visit.    Assessment     Reassessment 7/27/23:  Patient retested at visit 20 and shows improvement in: Pain, ROM, strength and function  Improved posture, using lumbar roll  Improved lumbar ROM, 0-54 degrees initially on med ex test and 0-60 degrees  currently  Improved strength at each test point on lumbar med ex IM test with 43%  average improvement noted with Reduced pain  Noted by patient  Initial outcome tool score of 22% and current outcome  tool score of 20% indicating reduced pain and improved function      Reassessment 6/19/23:  Patient has attended 10 visits at [facility] which included MD evaluation, PT evaluation with isometric testing, and physical therapy treatment including HEP instruction, education, aerobic activity, dynamic strengthening on MedX equipment for the spine, and whole body strengthening on MedX equipment with increasing resistance. Patient demonstrates increased ability to reduce symptoms, improve posture, improve ROM, and improve strength, as stated below:    -Improved posture, she is using lumbar roll  -Improved lumbar ROM, 0-54 degrees initially on MedX test and 0-57 degrees currently.  -Improved strength at each test point on lumbar MedX isometric test with 59 % average improvement noted with reduced pain noted by patient.  -Initial outcome tool score of 22% and current outcome tool score of 18% indicating reduced pain and improved function.        Patient is making good progress towards established goals.      Anticipated Barriers for therapy: none  Pt's spiritual, cultural and educational needs considered and pt agreeable to plan of care and goals as stated below:     Goals:   Short term goals:  6 weeks or 10 visits   - Pt will demonstrate increased lumbar ROM by at least 3 degrees from the initial ROM value with improvements noted in functional ROM and ability to perform ADLs. Appropriate and Ongoing Met 6/19/23  - Pt will demonstrate increased MedX average isometric strength value by 20% from initial test resulting in improved ability to perform bending, lifting, and carrying activities safely, confidently. Appropriate and Ongoing Met 6/19/23  - Pt will report a reduction in worst pain score by 1-2 points for improved tolerance for bending, rising from sitting. Appropriate and Ongoing Met 6/19/23  - Pt able to perform HEP correctly with minimal cueing or supervision from therapist to encourage independent  management of symptoms. Appropriate and Ongoing Met 6/19/23     Long term goals: 10 weeks or 20 visits   - Pt will demonstrate increased lumbar ROM by at least 6 degrees from initial ROM value, resulting in improved ability to perform functional forward bending while standing and sitting. Appropriate and Ongoing Met 7/27/23  - Pt will demonstrate increased MedX average isometric strength value by 30% from initial test resulting in improved ability to perform bending, lifting, and carrying activities safely and confidently. Appropriate and Ongoing Met 6/19/23  - Pt to demonstrate ability to independently control and reduce their pain through posture positioning and mechanical movements throughout a typical day. Appropriate and Ongoing Met 7/27/23  - Pt will demonstrate reduced pain and improved functional outcomes as reported on the FOTO by reaching a limitation score of < or = 18% or less in order to demonstrate subjective improvement in pt's condition.   Appropriate and Ongoing Met 6/19/23  - Pt will demonstrate independence with the HEP at discharge. Appropriate and Ongoing Met 7/27/23  - Pt will be able to resolve and manage pain (patient goal) Appropriate and Ongoing Met 7/27/23    Plan     Patient has attended 20 visits of the [facility] program focusing aerobic training, isometric testing with dynamic strengthening on MedX machine for spine, whole body strengthening on peripheral strengthening equipment, HEP, and patient education. Patient has completed the Healthy [facility] Program and is ready to be transitioned into wellness program. Educated on the importance of wellness program and attending weekly in order to maintain strength. Stressed the importance of continuing exercise and maintaining proper body mechanics and ergonomics in order to fully participate in activities of daily living, work, and leisure activities. At this time, patient demonstrates improvement in ability to reduce symptoms, improved  posture, improved spinal ROM and improved strength. Discharge handout with HEP given and reviewed all information given. Patient able to demonstrate and verbalize understanding. Patient does plan to attend wellness and is appropriate for transition.     Therapist: Arabella Childress PT  7/27/2023       [facility]   Discharge Note    Name: Rosalia Fernández  Clinic Number: [id number]    Therapy Diagnosis:   Encounter Diagnoses   Name Primary?    Chronic left-sided low back pain without sciatica Yes    Decreased strength of trunk and back      Physician: Dirk Alvarado PA-C    Physician Orders: PT Eval and Treat Healthy Back  Medical Diagnosis from Referral: lumbar spondylosis, lumbar radiculitis  Evaluation Date: 5/18/2023    Date of Last visit: 7/27/23  Total Visits Received: 20    ASSESSMENT      See above    Discharge reason: Other:  Pt has completed Healthy Back program and is ready to transition to wellness.    Discharge FOTO Score: 20%    Goals: met as above    PLAN   This patient is discharged from Physical Therapy      Arabella Childress PT

## 2023-07-31 ENCOUNTER — TELEPHONE (OUTPATIENT)
Dept: AUDIOLOGY | Facility: CLINIC | Age: 73
End: 2023-07-31
Payer: MEDICARE

## 2023-07-31 ENCOUNTER — DOCUMENTATION ONLY (OUTPATIENT)
Dept: REHABILITATION | Facility: HOSPITAL | Age: 73
End: 2023-07-31
Payer: MEDICARE

## 2023-07-31 NOTE — TELEPHONE ENCOUNTER
----- Message from Suzan Gomez sent at 7/31/2023 10:41 AM CDT -----  Contact: self  Type:  Sooner Appointment Request    Caller is requesting a sooner appointment.  Caller declined first available appointment listed below.  Caller will not accept being placed on the waitlist and is requesting a message be sent to doctor.  Name of Caller: Pt  When is the first available appointment? N/A  Symptoms:  Annual hearing test  Would the patient rather a call back or a response via [facility]? call  Best Call Back Number:[phone]    Please call to advise... Thank you...

## 2023-07-31 NOTE — PROGRESS NOTES
"Health  Wellness Visit Note    Name: Rosalia Fernández  Clinic Number: [id number]  Physician: Dirk Alvarado, PAAllisonC  Diagnosis: No diagnosis found.  Past Medical History:   Diagnosis Date    Colon polyps     Diverticulitis     Endometriosis     Hemorrhage     "microhemorrhage of brain"     Infertility, female     Melanoma     MVP (mitral valve prolapse)     Osteoarthritis      Visit Number: 21  Precautions: standard      1st PT visit:  5/18/23  Year of care end date:  5/18/24  Mindbody plan:1F  Patient level:C    Time In: 12:30  Time Out: 1:30  Total Treatment Time: 60    Wellness [employer] 2022  Handout on this week's wellness topic provided  along with a discussion on what it means, the benefits, and suggestions for practice.  Reviewed last week's topic of n/a new patient .    Subjective:   Patient reports that she has a little pain today in her back, around a 2/10. States For exercise she is walking 6 days a week. She walks around 40 minutes and does her stretches after. She is also icing her back everyday around 4 pm.     Objective:   Rosalia completed therapeutic stretches (EIL, PARISA) and the following MedX exercise machines: core lumbar, torso rotation l/r, leg extension, leg curl, upright row, chest press, biceps curl, triceps extension, leg press    See exercise log in patient folder for rate of exertion and repetitions completed.       Fitness Machine Education Key:  E=education on equipment initiated and further follow up and education needed  I=independent with  and exercise.  The patient:  Adjusts machines to his/her settings  Uses equipment levers, pins, weights safely  Maintains safe and correct posture while exercising  Moves through exercise with correct pace and control  Gets on and off equipment safely      Lumbar/Cervical Ext.  Torso Rotation  Leg Press    Leg Extension  Seated Leg Curl  Chest Press    Seated Row  Hip ADD  Hip ABD    Triceps Extension  Bicep Curl  Other:      [x] " Indicates exercise has been taught for home  Lumbar/Cervical Ext. [] Torso Rotation [] Leg Press []   Leg Extension [] Seated Leg Curl [] Chest Press []   Seated Row [] Hip ADD [] Hip ABD []   Triceps Extension [] Bicep Curl [] Other:        Assessment:   Patient tolerated MedX Core Lumbar Strength and all other peripheral exercises without an increase in symptoms. Patient warmed up on treadmill for 8 minutes, stretched, and iced low back for 5 minutes after the workout.     Plan:  Continue with established plan of care towards wellness goals.     Health  : Constance Sykes  7/31/2023

## 2023-07-31 NOTE — TELEPHONE ENCOUNTER
ROELOV asking patient to call us back so we can get her scheduled. She is due for her annual audiogram, and saw Caty Hood this year.

## 2023-08-08 PROBLEM — M54.50 CHRONIC LEFT-SIDED LOW BACK PAIN WITHOUT SCIATICA: Status: RESOLVED | Noted: 2023-04-21 | Resolved: 2023-07-27

## 2023-08-08 PROBLEM — R29.898 DECREASED STRENGTH OF TRUNK AND BACK: Status: RESOLVED | Noted: 2023-05-19 | Resolved: 2023-07-27

## 2023-08-08 PROBLEM — G89.29 CHRONIC LEFT-SIDED LOW BACK PAIN WITHOUT SCIATICA: Status: RESOLVED | Noted: 2023-04-21 | Resolved: 2023-07-27

## 2023-08-11 ENCOUNTER — DOCUMENTATION ONLY (OUTPATIENT)
Dept: REHABILITATION | Facility: HOSPITAL | Age: 73
End: 2023-08-11
Payer: MEDICARE

## 2023-08-11 ENCOUNTER — CLINICAL SUPPORT (OUTPATIENT)
Dept: AUDIOLOGY | Facility: CLINIC | Age: 73
End: 2023-08-11
Payer: MEDICARE

## 2023-08-11 DIAGNOSIS — H91.93 BILATERAL HEARING LOSS, UNSPECIFIED HEARING LOSS TYPE: Primary | ICD-10-CM

## 2023-08-11 PROCEDURE — 92556 PR SPEECH AUDIOMETRY, COMPLETE: ICD-10-PCS | Mod: S$GLB,,, | Performed by: AUDIOLOGIST

## 2023-08-11 PROCEDURE — 92556 SPEECH AUDIOMETRY COMPLETE: CPT | Mod: S$GLB,,, | Performed by: AUDIOLOGIST

## 2023-08-11 PROCEDURE — 92552 PR PURE TONE AUDIOMETRY, AIR: ICD-10-PCS | Mod: S$GLB,,, | Performed by: AUDIOLOGIST

## 2023-08-11 PROCEDURE — 92552 PURE TONE AUDIOMETRY AIR: CPT | Mod: S$GLB,,, | Performed by: AUDIOLOGIST

## 2023-08-11 NOTE — PROGRESS NOTES
"Health  Wellness Visit Note    Name: Rosalia Fernández  Clinic Number: [id number]  Physician: Dirk Alvarado, PAAllisonC  Diagnosis: No diagnosis found.  Past Medical History:   Diagnosis Date    Colon polyps     Diverticulitis     Endometriosis     Hemorrhage     "microhemorrhage of brain"     Infertility, female     Melanoma     MVP (mitral valve prolapse)     Osteoarthritis      Visit Number: 21  Precautions: standard      1st PT visit:  5/18/23  Year of care end date:  5/18/24  Mindbody plan:1F  Patient level:C    Time In: 12:30  Time Out: 1:30  Total Treatment Time: 60    [employer]  Handout on this week's wellness topic provided stages of stages of change along with a discussion on what it means, the benefits, and suggestions for practice.  Reviewed last week's topic of n/a new patient .    Subjective:   Patient reports that she has a little pain today in her back, around a 2/10. States For exercise she is walking 6 days a week. She walks around 40 minutes and does her stretches after. She is also icing her back everyday around 4 pm.     Objective:   Rosalia completed therapeutic stretches (EIL, PARISA) and the following MedX exercise machines: core lumbar, torso rotation l/r, leg extension, leg curl, upright row, chest press, biceps curl, triceps extension, leg press    See exercise log in patient folder for rate of exertion and repetitions completed.       Fitness Machine Education Key:  E=education on equipment initiated and further follow up and education needed  I=independent with  and exercise.  The patient:  Adjusts machines to his/her settings  Uses equipment levers, pins, weights safely  Maintains safe and correct posture while exercising  Moves through exercise with correct pace and control  Gets on and off equipment safely      Lumbar/Cervical Ext.  Torso Rotation  Leg Press    Leg Extension  Seated Leg Curl  Chest Press    Seated Row  Hip ADD  Hip ABD    Triceps Extension  Bicep " Curl  Other:      [x] Indicates exercise has been taught for home  Lumbar/Cervical Ext. [] Torso Rotation [] Leg Press []   Leg Extension [] Seated Leg Curl [] Chest Press []   Seated Row [] Hip ADD [] Hip ABD []   Triceps Extension [] Bicep Curl [] Other:        Assessment:   Patient tolerated MedX Core Lumbar Strength and all other peripheral exercises without an increase in symptoms. Patient warmed up on treadmill for 8 minutes, stretched, and iced low back for 5 minutes after the workout.     Plan:  Continue with established plan of care towards wellness goals.     Health  : Constance Sykes  8/11/2023

## 2023-08-11 NOTE — PROGRESS NOTES
The patient was referred by Caty Hood NP for a hearing evaluation.    Report from the patient was as follows:    Difficulty Hearing/Understanding - history of bilateral sensorineural hearing loss and use of hearing aid binaurally, Patient is here today for annual hearing evaluation.    Otoscopic screening revealed a clear view of TM AU    A hearing evaluation was performed today. Test results indicated a mild to moderate hearing loss in the right ear and a mild to moderately severe hearing loss in the left ear. (Air only since results were essentially the same as those from 7/19/22)    The recommendations were as follows:    (1)  Continued use of binaural amplification with hearing aid check  (2)  Ear protection in loud noise   (3)  Hearing evaluation in one year or sooner if hearing decrease is noted       Today's test results and recommendations were discussed with the patient.

## 2023-08-18 ENCOUNTER — DOCUMENTATION ONLY (OUTPATIENT)
Dept: REHABILITATION | Facility: HOSPITAL | Age: 73
End: 2023-08-18
Payer: MEDICARE

## 2023-08-21 NOTE — PROGRESS NOTES
"Health  Wellness Visit Note    Name: Rosalia Fernández  Clinic Number: [id number]  Physician: Dirk Alvarado, PAAllisonC  Diagnosis: No diagnosis found.  Past Medical History:   Diagnosis Date    Colon polyps     Diverticulitis     Endometriosis     Hemorrhage     "microhemorrhage of brain"     Infertility, female     Melanoma     MVP (mitral valve prolapse)     Osteoarthritis      Visit Number: 22  Precautions: standard      1st PT visit:  5/18/23  Year of care end date:  5/18/24  Mindbody plan:1F  Patient level:C    Time In: 12:30  Time Out: 1:30  Total Treatment Time: 60    [employer]22  Handout on this week's wellness topic provided stages of stages of change along with a discussion on what it means, the benefits, and suggestions for practice.  Reviewed last week's topic of n/a new patient .    Subjective:   Patient reports that she has a little pain today in her back, around a 2/10. States For exercise she is walking 6 days a week. She walks around 40 minutes and does her stretches after. She is also icing her back everyday around 4 pm.     Objective:   Rosalia completed therapeutic stretches (EIL, PARISA) and the following MedX exercise machines: core lumbar, torso rotation l/r, leg extension, leg curl, upright row, chest press, biceps curl, triceps extension, leg press    See exercise log in patient folder for rate of exertion and repetitions completed.       Fitness Machine Education Key:  E=education on equipment initiated and further follow up and education needed  I=independent with  and exercise.  The patient:  Adjusts machines to his/her settings  Uses equipment levers, pins, weights safely  Maintains safe and correct posture while exercising  Moves through exercise with correct pace and control  Gets on and off equipment safely      Lumbar/Cervical Ext.  Torso Rotation  Leg Press    Leg Extension  Seated Leg Curl  Chest Press    Seated Row  Hip ADD  Hip ABD    Triceps Extension  Bicep " Curl  Other:      [x] Indicates exercise has been taught for home  Lumbar/Cervical Ext. [] Torso Rotation [] Leg Press []   Leg Extension [] Seated Leg Curl [] Chest Press []   Seated Row [] Hip ADD [] Hip ABD []   Triceps Extension [] Bicep Curl [] Other:        Assessment:   Patient tolerated MedX Core Lumbar Strength and all other peripheral exercises without an increase in symptoms. Patient warmed up on treadmill for 8 minutes, stretched, and iced low back for 5 minutes after the workout.     Plan:  Continue with established plan of care towards wellness goals.     Health  : Constance Sykes  8/21/2023

## 2023-08-25 ENCOUNTER — DOCUMENTATION ONLY (OUTPATIENT)
Dept: REHABILITATION | Facility: HOSPITAL | Age: 73
End: 2023-08-25
Payer: MEDICARE

## 2023-08-25 NOTE — PROGRESS NOTES
"Health  Wellness Visit Note    Name: Rosalia Fernández  Clinic Number: [id number]  Physician: Dirk Alvarado, PAAllisonC  Diagnosis: No diagnosis found.  Past Medical History:   Diagnosis Date    Colon polyps     Diverticulitis     Endometriosis     Hemorrhage     "microhemorrhage of brain"     Infertility, female     Melanoma     MVP (mitral valve prolapse)     Osteoarthritis      Visit Number: 22  Precautions: standard      1st PT visit:  5/18/23  Year of care end date:  5/18/24  Mindbody plan:1F  Patient level:C    Time In: 12:30  Time Out: 1:30  Total Treatment Time: 60    [employer]22  Handout on this week's wellness topic provided stages of stages of change along with a discussion on what it means, the benefits, and suggestions for practice.  Reviewed last week's topic of n/a new patient .    Subjective:   Patient reports that she has a little pain today in her back, around a 2/10. States For exercise she is walking 6 days a week. She walks around 40 minutes and does her stretches after. She is also icing her back everyday around 4 pm.     Objective:   Rosalia completed therapeutic stretches (EIL, PARISA) and the following MedX exercise machines: core lumbar, torso rotation l/r, leg extension, leg curl, upright row, chest press, biceps curl, triceps extension, leg press    See exercise log in patient folder for rate of exertion and repetitions completed.       Fitness Machine Education Key:  E=education on equipment initiated and further follow up and education needed  I=independent with  and exercise.  The patient:  Adjusts machines to his/her settings  Uses equipment levers, pins, weights safely  Maintains safe and correct posture while exercising  Moves through exercise with correct pace and control  Gets on and off equipment safely      Lumbar/Cervical Ext.  Torso Rotation  Leg Press    Leg Extension  Seated Leg Curl  Chest Press    Seated Row  Hip ADD  Hip ABD    Triceps Extension  Bicep " Curl  Other:      [x] Indicates exercise has been taught for home  Lumbar/Cervical Ext. [] Torso Rotation [] Leg Press []   Leg Extension [] Seated Leg Curl [] Chest Press []   Seated Row [] Hip ADD [] Hip ABD []   Triceps Extension [] Bicep Curl [] Other:        Assessment:   Patient tolerated MedX Core Lumbar Strength and all other peripheral exercises without an increase in symptoms. Patient warmed up on treadmill for 8 minutes, stretched, and iced low back for 5 minutes after the workout.     Plan:  Continue with established plan of care towards wellness goals.     Health  : Constance Sykes  8/25/2023

## 2023-08-30 ENCOUNTER — TELEPHONE (OUTPATIENT)
Dept: PAIN MEDICINE | Facility: CLINIC | Age: 73
End: 2023-08-30
Payer: MEDICARE

## 2023-08-30 NOTE — TELEPHONE ENCOUNTER
I scheduled her a follow up with me on Wednesday, Sep 6th at 9:30am.    Please let her know this.  If the time doesn't work, find a time that does work for her.  Ok to double book.

## 2023-08-30 NOTE — TELEPHONE ENCOUNTER
----- Message from Maddy Salazar sent at 8/25/2023 12:07 PM CDT -----  Contact: self  Type: Needs Medical Advice  Who Called:  pt  Best Call Back Number.[phone]  Additional Information: pt states she's just finished physical therapy  and would like advise on what she needs to do next.please call

## 2023-09-01 ENCOUNTER — DOCUMENTATION ONLY (OUTPATIENT)
Dept: REHABILITATION | Facility: HOSPITAL | Age: 73
End: 2023-09-01
Payer: MEDICARE

## 2023-09-01 NOTE — PROGRESS NOTES
"Health  Wellness Visit Note    Name: Rosalia Fernández  Clinic Number: [id number]  Physician: Dirk Alvarado, PAAllisonC  Diagnosis: No diagnosis found.  Past Medical History:   Diagnosis Date    Colon polyps     Diverticulitis     Endometriosis     Hemorrhage     "microhemorrhage of brain"     Infertility, female     Melanoma     MVP (mitral valve prolapse)     Osteoarthritis      Visit Number: 22  Precautions: standard      1st PT visit:  5/18/23  Year of care end date:  5/18/24  Mindbody plan: Plan A - 10 months  Patient level:C    Time In: 11:00  Time Out: 12:00  Total Treatment Time: 60    [employer]22  Handout on this week's wellness topic provided stages of stages of change along with a discussion on what it means, the benefits, and suggestions for practice.  Reviewed last week's topic of n/a new patient .    Subjective:   Patient reports that she has a little pain today in her back, she states that she always has pain normally around a 2/10. Pt has to use a cream to help manage her pain. States for exercise she is walking 6 days a week. She walks around 40 minutes and does her stretches after. She is also icing her back everyday around 4 pm.     Objective:   Rosalia completed therapeutic stretches (EIL, PARISA) and the following MedX exercise machines: core lumbar, torso rotation l/r, leg extension, leg curl, upright row, chest press, biceps curl, triceps extension, leg press    See exercise log in patient folder for rate of exertion and repetitions completed.       Fitness Machine Education Key:  E=education on equipment initiated and further follow up and education needed  I=independent with  and exercise.  The patient:  Adjusts machines to his/her settings  Uses equipment levers, pins, weights safely  Maintains safe and correct posture while exercising  Moves through exercise with correct pace and control  Gets on and off equipment safely      Lumbar/Cervical Ext.  Torso Rotation  Leg Press "    Leg Extension  Seated Leg Curl  Chest Press    Seated Row  Hip ADD  Hip ABD    Triceps Extension  Bicep Curl  Other:      [x] Indicates exercise has been taught for home  Lumbar/Cervical Ext. [] Torso Rotation [] Leg Press []   Leg Extension [] Seated Leg Curl [] Chest Press []   Seated Row [] Hip ADD [] Hip ABD []   Triceps Extension [] Bicep Curl [] Other:        Assessment:   Patient tolerated MedX Core Lumbar Strength and all other peripheral exercises without an increase in symptoms. Patient warmed up on treadmill for 8 minutes, stretched, and iced low back for 5 minutes after the workout.     Plan:  Continue with established plan of care towards wellness goals.     Health  : Constance Sykes  9/1/2023

## 2023-09-06 ENCOUNTER — OFFICE VISIT (OUTPATIENT)
Dept: PAIN MEDICINE | Facility: CLINIC | Age: 73
End: 2023-09-06
Payer: MEDICARE

## 2023-09-06 VITALS
BODY MASS INDEX: 18.63 KG/M2 | DIASTOLIC BLOOD PRESSURE: 59 MMHG | SYSTOLIC BLOOD PRESSURE: 132 MMHG | HEART RATE: 79 BPM | WEIGHT: 109.13 LBS | HEIGHT: 64 IN

## 2023-09-06 DIAGNOSIS — M47.816 LUMBAR SPONDYLOSIS: Primary | ICD-10-CM

## 2023-09-06 DIAGNOSIS — M54.9 DORSALGIA, UNSPECIFIED: ICD-10-CM

## 2023-09-06 PROCEDURE — 3075F SYST BP GE 130 - 139MM HG: CPT | Mod: CPTII,S$GLB,, | Performed by: ANESTHESIOLOGY

## 2023-09-06 PROCEDURE — 1160F PR REVIEW ALL MEDS BY PRESCRIBER/CLIN PHARMACIST DOCUMENTED: ICD-10-PCS | Mod: CPTII,S$GLB,, | Performed by: ANESTHESIOLOGY

## 2023-09-06 PROCEDURE — 99999 PR PBB SHADOW E&M-EST. PATIENT-LVL IV: CPT | Mod: PBBFAC,,, | Performed by: ANESTHESIOLOGY

## 2023-09-06 PROCEDURE — 3288F FALL RISK ASSESSMENT DOCD: CPT | Mod: CPTII,S$GLB,, | Performed by: ANESTHESIOLOGY

## 2023-09-06 PROCEDURE — 1160F RVW MEDS BY RX/DR IN RCRD: CPT | Mod: CPTII,S$GLB,, | Performed by: ANESTHESIOLOGY

## 2023-09-06 PROCEDURE — 3044F HG A1C LEVEL LT 7.0%: CPT | Mod: CPTII,S$GLB,, | Performed by: ANESTHESIOLOGY

## 2023-09-06 PROCEDURE — 99214 OFFICE O/P EST MOD 30 MIN: CPT | Mod: S$GLB,,, | Performed by: ANESTHESIOLOGY

## 2023-09-06 PROCEDURE — 3044F PR MOST RECENT HEMOGLOBIN A1C LEVEL <7.0%: ICD-10-PCS | Mod: CPTII,S$GLB,, | Performed by: ANESTHESIOLOGY

## 2023-09-06 PROCEDURE — 3078F PR MOST RECENT DIASTOLIC BLOOD PRESSURE < 80 MM HG: ICD-10-PCS | Mod: CPTII,S$GLB,, | Performed by: ANESTHESIOLOGY

## 2023-09-06 PROCEDURE — 1125F PR PAIN SEVERITY QUANTIFIED, PAIN PRESENT: ICD-10-PCS | Mod: CPTII,S$GLB,, | Performed by: ANESTHESIOLOGY

## 2023-09-06 PROCEDURE — 1159F MED LIST DOCD IN RCRD: CPT | Mod: CPTII,S$GLB,, | Performed by: ANESTHESIOLOGY

## 2023-09-06 PROCEDURE — 3075F PR MOST RECENT SYSTOLIC BLOOD PRESS GE 130-139MM HG: ICD-10-PCS | Mod: CPTII,S$GLB,, | Performed by: ANESTHESIOLOGY

## 2023-09-06 PROCEDURE — 99999 PR PBB SHADOW E&M-EST. PATIENT-LVL IV: ICD-10-PCS | Mod: PBBFAC,,, | Performed by: ANESTHESIOLOGY

## 2023-09-06 PROCEDURE — 3288F PR FALLS RISK ASSESSMENT DOCUMENTED: ICD-10-PCS | Mod: CPTII,S$GLB,, | Performed by: ANESTHESIOLOGY

## 2023-09-06 PROCEDURE — 3078F DIAST BP <80 MM HG: CPT | Mod: CPTII,S$GLB,, | Performed by: ANESTHESIOLOGY

## 2023-09-06 PROCEDURE — 3008F PR BODY MASS INDEX (BMI) DOCUMENTED: ICD-10-PCS | Mod: CPTII,S$GLB,, | Performed by: ANESTHESIOLOGY

## 2023-09-06 PROCEDURE — 1101F PT FALLS ASSESS-DOCD LE1/YR: CPT | Mod: CPTII,S$GLB,, | Performed by: ANESTHESIOLOGY

## 2023-09-06 PROCEDURE — 3008F BODY MASS INDEX DOCD: CPT | Mod: CPTII,S$GLB,, | Performed by: ANESTHESIOLOGY

## 2023-09-06 PROCEDURE — 1101F PR PT FALLS ASSESS DOC 0-1 FALLS W/OUT INJ PAST YR: ICD-10-PCS | Mod: CPTII,S$GLB,, | Performed by: ANESTHESIOLOGY

## 2023-09-06 PROCEDURE — 1159F PR MEDICATION LIST DOCUMENTED IN MEDICAL RECORD: ICD-10-PCS | Mod: CPTII,S$GLB,, | Performed by: ANESTHESIOLOGY

## 2023-09-06 PROCEDURE — 1125F AMNT PAIN NOTED PAIN PRSNT: CPT | Mod: CPTII,S$GLB,, | Performed by: ANESTHESIOLOGY

## 2023-09-06 PROCEDURE — 99214 PR OFFICE/OUTPT VISIT, EST, LEVL IV, 30-39 MIN: ICD-10-PCS | Mod: S$GLB,,, | Performed by: ANESTHESIOLOGY

## 2023-09-06 NOTE — PROGRESS NOTES
[facility] Follow Up Evaluation      Referred by: No ref. provider found    PCP:     CC:   No chief complaint on file.         9/6/2023     9:32 AM 4/5/2023     2:53 PM   Last 3 PDI Scores   Pain Disability Index (PDI) 16 28         Interval HPI 9/6/2023: Rosalia Fernández returns to the office for follow up.  Today she reports she continues to have primarily left-sided axial lower back pain.  The pain is intermittent and worse when she does activities around her house like sweeping or standing to cook a big meal.  When her pain is bothering her it gets his 6/10.  She denies any radicular pain, numbness, weakness.    HPI:   Rosalia Fernández is a 72 y.o. female who presents with back pain.  She is had chronic lower back pain for many years and has been gradually worsening.  Today she reports her back pain is primarily radiating down into the left buttock and occasionally down the outside of the left leg stopping at the knee.  She denies any numbness or weakness.  Her pain is worse with activities, walking, bending, coughing.  She is unsure of any specific thing that improves her pain as her pain is constant.      Pain Intervention History:  - status post 1st diagnostic left L3-4, L4-5, L5-S1 medial branch blocks on 4/12/23 with over 80% relief lasting for 8 hours.      Past Spine Surgical History:      Past and current medications:  Antineuropathics:  NSAIDs: mobic, celebrex  Physical therapy: yes, completed   Antidepressants:  Muscle relaxers:  Opioids:  Antiplatelets/Anticoagulants:    History:    Current Outpatient Medications:     acetaminophen (TYLENOL) 500 MG tablet, Take 1,000 mg by mouth every 6 (six) hours as needed for Pain., Disp: , Rfl:     ascorbic acid (VITAMIN C) 500 MG tablet, Take 500 mg by mouth once daily., Disp: , Rfl:     CALCIUM CITRATE/VITAMIN D3 (CALCIUM CITRATE + ORAL), Take 1 tablet by mouth Daily., Disp: , Rfl:     celecoxib (CELEBREX) 200 MG capsule, Take 200 mg by mouth 2  "(two) times daily as needed for Pain., Disp: , Rfl:     cholecalciferol, vitamin D3, (VITAMIN D3) 25 mcg (1,000 unit) capsule, Take 2,000 Units by mouth once daily., Disp: , Rfl:     coQ10, ubiquinol, 100 mg Cap, Take 1 tablet by mouth once daily., Disp: , Rfl:     krill oil 500 mg Cap, Take 2 capsules by mouth Daily., Disp: , Rfl:     LACTOBACILLUS ACIDOPHILUS (PROBIOTIC ORAL), Take by mouth., Disp: , Rfl:     LIDOcaine 4 % PtMd, Apply topically daily as needed., Disp: , Rfl:     meclizine (ANTIVERT) 25 mg tablet, Take 1 tablet (25 mg total) by mouth 3 (three) times daily as needed for Dizziness or Nausea., Disp: 30 tablet, Rfl: 1    meloxicam (MOBIC) 15 MG tablet, Take 1 tablet (15 mg total) by mouth once daily., Disp: 30 tablet, Rfl: 3    multivitamin (THERAGRAN) per tablet, Take 1 tablet by mouth once daily., Disp: , Rfl:     PFIZER COVID BIVAL,12Y UP,,PF, 30 mcg/0.3 mL injection, , Disp: , Rfl:     QUICKVUE AT-HOME COVID-19 TEST Kit, REFER TO  INSTRUCTIONS INCLUDED IN PACKAGING, Disp: , Rfl:     traMADoL (ULTRAM) 50 mg tablet, Take 1 tablet (50 mg total) by mouth every 12 (twelve) hours as needed for Pain., Disp: 14 tablet, Rfl: 0    Past Medical History:   Diagnosis Date    Colon polyps     Diverticulitis     Endometriosis     Hemorrhage     "microhemorrhage of brain"     Infertility, female     Melanoma     MVP (mitral valve prolapse)     Osteoarthritis        Past Surgical History:   Procedure Laterality Date    COLONOSCOPY  06/29/2021    Dr. Jeanette MOSQUERA  2016    Endometriosis      HERNIA REPAIR      bladder hernia    HYSTERECTOMY      [facility], with MESH TAHBSO    INJECTION OF ANESTHETIC AGENT AROUND MEDIAL BRANCH NERVES INNERVATING LUMBAR FACET JOINT Left 4/12/2023    Procedure: Block-nerve-medial branch-lumbar L3/4,L4/5,L5/S1;  Surgeon: Chase Cody MD;  Location: [facility] OR;  Service: Pain Management;  Laterality: Left;    Melanoma Removal      OOPHORECTOMY      TOE SURGERY Left     " TONSILLECTOMY         Family History   Problem Relation Age of Onset    Thyroid disease Mother     Dementia Mother     Hypertension Mother     Arthritis Father     Breast cancer Sister 68       Social History     Socioeconomic History    Marital status:      Spouse name: Mason     Number of children: 0   Occupational History    Occupation: retired   Tobacco Use    Smoking status: Never    Smokeless tobacco: Never   Substance and Sexual Activity    Alcohol use: Yes     Alcohol/week: 6.0 - 7.0 standard drinks of alcohol     Types: 6 - 7 Glasses of wine per week     Comment: 3 or 4 a day    Drug use: No    Sexual activity: Yes     Partners: Male     Social Determinants of Health     Financial Resource Strain: Low Risk  (4/25/2023)    Overall Financial Resource Strain (CARDIA)     Difficulty of Paying Living Expenses: Not hard at all   Food Insecurity: No Food Insecurity (3/9/2023)    Hunger Vital Sign     Worried About Running Out of Food in the Last Year: Never true     Ran Out of Food in the Last Year: Never true   Transportation Needs: No Transportation Needs (3/9/2023)    PRAPARE - Transportation     Lack of Transportation (Medical): No     Lack of Transportation (Non-Medical): No   Physical Activity: Sufficiently Active (3/9/2023)    Exercise Vital Sign     Days of Exercise per Week: 4 days     Minutes of Exercise per Session: 40 min   Stress: Stress Concern Present (3/9/2023)    [employer] - Occupational Stress Questionnaire     Feeling of Stress : To some extent   Social Connections: Unknown (4/25/2023)    Social Connection and Isolation Panel [NHANES]     Frequency of Communication with Friends and Family: More than three times a week     Frequency of Social Gatherings with Friends and Family: More than three times a week     Active Member of Clubs or Organizations: Yes     Attends Club or Organization Meetings: 1 to 4 times per year     Marital Status:    Housing  "Stability: Low Risk  (3/9/2023)    Housing Stability Vital Sign     Unable to Pay for Housing in the Last Year: No     Number of Places Lived in the Last Year: 1     Unstable Housing in the Last Year: No       Review of patient's allergies indicates:   Allergen Reactions    Prednisone Other (See Comments)     Blurry vision, not sure if dose was too much for her.       Review of Systems:  12 point review of systems is negative.    Physical Exam:  Vitals:    09/06/23 0930   BP: (!) 132/59   Pulse: 79   Weight: 49.5 kg (109 lb 2 oz)   Height: 5' 4" (1.626 m)   PainSc:   3   PainLoc: Back     Body mass index is 18.73 kg/m².    Gen: NAD  Psych: mood appropriate for given condition  HEENT: eyes anicteric   CV: RRR, 2+ radial pulse  HEENT: anicteric   Respiratory: non-labored, no signs of respiratory distress  Abd: non-distended  Skin: warm, dry and intact.  Gait: No antalgic gait.     Mild pain with lumbar axial facet loading, left greater than right      Sensory:  Intact and symmetrical to light touch in L1-S1 dermatomes bilaterally.    Motor:     Right Left   L2/3 Iliacus Hip flexion  5  5   L3/4 Qudratus Femoris Knee Extension  5  5   L4/5 Tib Anterior Ankle Dorsiflexion   5  5   L5/S1 Extensor Hallicus Longus Great toe extension  5  5   S1/S2 Gastroc/Soleus Plantar Flexion  5  5      Right Left                  Patellar DTR 2+ 0   Achilles DTR 0 0                      Imaging:  Xray lumbar spine 3/13/23  FINDINGS:  There is scoliosis present with convexity to the right.  This measures approximately 24° with the apex at L1-2.  No fractures are seen.  There are diffuse degenerative changes.    Xray SI joints 3/13/23  FINDINGS:  No acute displaced fracture or dislocation identified.  There are mild degenerative changes noted of the bilateral sacroiliac joints.  Sacral arcuate lines appear intact.  No radiopaque foreign body    MRI lumbar spine 03/30/2023     FINDINGS:  Morphology: Vertebral body heights are preserved.  " Prominent Modic type 1 degenerative endplate changes at L2-L3 in the setting of broad rotatory dextroscoliotic curvature discussed in further detail below.  Associated small Schmorl's nodes.  Marrow signal is otherwise within normal limits..     Alignment: Broad rotatory dextroscoliotic curvature apex at the L2-L3 level.  Grade 1 anterolisthesis of L4 on L5.     Cord: Normal in contour, caliber, and signal intensity.  Conus positioning is within normal limits.     Disc levels:     T12-L1: Within normal limits.  The spinal canal and foramina are patent.  L1-L2: Mild degenerative disc height loss and bilateral facet arthrosis.  No disc herniation.  The spinal canal and foramina remain patent.  L2-L3: Severe degenerative disc height loss asymmetric to the left.  Shallow disc bulging and moderate bilateral facet arthrosis with encroachment of both subarticular recesses, left greater than right without substantial narrowing of the spinal canal.  There is moderate left foraminal narrowing.  The right foramen is patent.  L3-L4: Mild degenerative disc height loss and desiccation with shallow disc bulging and moderate to severe facet arthrosis with ligamentum flavum thickening and small facet effusions.  Mild encroachment of both subarticular recesses.  The spinal canal remains patent.  Mild left foraminal narrowing.  The right foramen is patent.  Small facet effusions, left greater than right.  L4-L5: Severe degenerative disc height loss asymmetric to the right.  Shallow disc bulging and severe facet arthrosis with ligamentum flavum thickening and grade 1 spondylolisthesis producing mild encroachment of both subarticular recesses, right greater than left.  The spinal canal remains patent.  Mild-to-moderate right foraminal narrowing.  The left foramen is patent.  L5-S1: Mild degenerative disc height loss and moderate bilateral facet arthrosis with small facet effusions, right greater than left.  The spinal canal and  subarticular recesses as well as both foramina remain patent.     Other: Visualized paraspinal soft tissues are within normal limits.     Impression:     1. Broad rotatory dextroscoliotic curvature of the lumbar spine apex at the L2-L3 level with reactive Modic type 1 endplate changes.  2. Multifactorial degenerative changes including advanced multilevel facet arthrosis but no substantial spinal canal narrowing.  Multilevel subarticular recess narrowing as discussed above.  3. Mild to moderate right L4-L5 and moderate left L2-L3 foraminal narrowing.    Labs:  Lab Results   Component Value Date    HGBA1C 5.3 03/13/2023       Lab Results   Component Value Date    WBC 5.79 06/17/2021    HGB 14.0 06/17/2021    HCT 42.6 06/17/2021    MCV 91 06/17/2021     06/17/2021           Assessment:   Problem List Items Addressed This Visit    None  Visit Diagnoses       Lumbar spondylosis    -  Primary    Relevant Orders    Case Request Operating Room: Block-nerve-medial branch-lumbar (Completed)    Dorsalgia, unspecified                72 y.o. year old female with PMH history of transient neurologic symptoms, mitral valve prolapse who presents with back pain.  She is had chronic lower back pain for many years and has been gradually worsening.  Today she reports her back pain is primarily radiating down into the left buttock and occasionally down the outside of the left leg stopping at the knee.  She denies any numbness or weakness.  Her pain is worse with activities, walking, bending, coughing.  She is unsure of any specific thing that improves her pain as her pain is constant.    9/6/23 - Rosalia Fernández returns to the office for follow up.  Today she reports she continues to have primarily left-sided axial lower back pain.  The pain is intermittent and worse when she does activities around her house like sweeping or standing to cook a big meal.  When her pain is bothering her it gets his 6/10.  She denies any radicular  pain, numbness, weakness.      - on exam she has full strength of her lower extremities intact sensation light touch bilateral L2-S1.  Increased pain with axial facet loading on the left  - I independently reviewed her lumbar MRI and is consistent with broad rotatory dextroscoliotic curvature of the lumbar spine apex at the L2-L3 level with reactive Modic type 1 endplate changes. Multifactorial degenerative changes including advanced multilevel facet arthrosis but no substantial spinal canal narrowing.  - over the past 6 weeks she has been participating in formal physical therapy and PT directed home exercises.  She continues to report significant axial back pain that can get to 6/10 with daily household activities such as standing for a long time to cook or sweeping her floor.  This pain is limiting her mobility and interfering with her daily living.  - she avoids NSAIDs due to history of cerebral hemorrhage.  She is not finding relief with Tylenol.  She can not tolerate tramadol.  - she is status post 1st diagnostic left L3-4, L4-5, L5-S1 medial branch blocks on 4/12/23 with over 80% relief lasting for 8 hours.    - We are going to schedule for 2nd diagnostic left L3-4, L4-5, L5-S1 medial branch blocks.  The risks and benefits of this intervention, and alternative therapies were discussed with the patient.  The discussion of risks included infection, bleeding, need for additional procedures or surgery, nerve damage.  Questions regarding the procedure, risks, expected outcome, and possible side effects were solicited and answered to the patient's satisfaction.  Rosalia Fernández wishes to proceed with the injection or procedure.  Written consent was obtained.      : Reviewed       This note was completed with dictation software and grammatical errors may exist.

## 2023-09-06 NOTE — H&P (VIEW-ONLY)
[facility] Follow Up Evaluation      Referred by: No ref. provider found    PCP:     CC:   No chief complaint on file.         9/6/2023     9:32 AM 4/5/2023     2:53 PM   Last 3 PDI Scores   Pain Disability Index (PDI) 16 28         Interval HPI 9/6/2023: Rosalia Fernández returns to the office for follow up.  Today she reports she continues to have primarily left-sided axial lower back pain.  The pain is intermittent and worse when she does activities around her house like sweeping or standing to cook a big meal.  When her pain is bothering her it gets his 6/10.  She denies any radicular pain, numbness, weakness.    HPI:   Rosalia Fernández is a 72 y.o. female who presents with back pain.  She is had chronic lower back pain for many years and has been gradually worsening.  Today she reports her back pain is primarily radiating down into the left buttock and occasionally down the outside of the left leg stopping at the knee.  She denies any numbness or weakness.  Her pain is worse with activities, walking, bending, coughing.  She is unsure of any specific thing that improves her pain as her pain is constant.      Pain Intervention History:  - status post 1st diagnostic left L3-4, L4-5, L5-S1 medial branch blocks on 4/12/23 with over 80% relief lasting for 8 hours.      Past Spine Surgical History:      Past and current medications:  Antineuropathics:  NSAIDs: mobic, celebrex  Physical therapy: yes, completed   Antidepressants:  Muscle relaxers:  Opioids:  Antiplatelets/Anticoagulants:    History:    Current Outpatient Medications:     acetaminophen (TYLENOL) 500 MG tablet, Take 1,000 mg by mouth every 6 (six) hours as needed for Pain., Disp: , Rfl:     ascorbic acid (VITAMIN C) 500 MG tablet, Take 500 mg by mouth once daily., Disp: , Rfl:     CALCIUM CITRATE/VITAMIN D3 (CALCIUM CITRATE + ORAL), Take 1 tablet by mouth Daily., Disp: , Rfl:     celecoxib (CELEBREX) 200 MG capsule, Take 200 mg by mouth 2  "(two) times daily as needed for Pain., Disp: , Rfl:     cholecalciferol, vitamin D3, (VITAMIN D3) 25 mcg (1,000 unit) capsule, Take 2,000 Units by mouth once daily., Disp: , Rfl:     coQ10, ubiquinol, 100 mg Cap, Take 1 tablet by mouth once daily., Disp: , Rfl:     krill oil 500 mg Cap, Take 2 capsules by mouth Daily., Disp: , Rfl:     LACTOBACILLUS ACIDOPHILUS (PROBIOTIC ORAL), Take by mouth., Disp: , Rfl:     LIDOcaine 4 % PtMd, Apply topically daily as needed., Disp: , Rfl:     meclizine (ANTIVERT) 25 mg tablet, Take 1 tablet (25 mg total) by mouth 3 (three) times daily as needed for Dizziness or Nausea., Disp: 30 tablet, Rfl: 1    meloxicam (MOBIC) 15 MG tablet, Take 1 tablet (15 mg total) by mouth once daily., Disp: 30 tablet, Rfl: 3    multivitamin (THERAGRAN) per tablet, Take 1 tablet by mouth once daily., Disp: , Rfl:     PFIZER COVID BIVAL,12Y UP,,PF, 30 mcg/0.3 mL injection, , Disp: , Rfl:     QUICKVUE AT-HOME COVID-19 TEST Kit, REFER TO  INSTRUCTIONS INCLUDED IN PACKAGING, Disp: , Rfl:     traMADoL (ULTRAM) 50 mg tablet, Take 1 tablet (50 mg total) by mouth every 12 (twelve) hours as needed for Pain., Disp: 14 tablet, Rfl: 0    Past Medical History:   Diagnosis Date    Colon polyps     Diverticulitis     Endometriosis     Hemorrhage     "microhemorrhage of brain"     Infertility, female     Melanoma     MVP (mitral valve prolapse)     Osteoarthritis        Past Surgical History:   Procedure Laterality Date    COLONOSCOPY  06/29/2021    Dr. Jeanette MOSQUERA  2016    Endometriosis      HERNIA REPAIR      bladder hernia    HYSTERECTOMY      [place], with MESH TAHBSO    INJECTION OF ANESTHETIC AGENT AROUND MEDIAL BRANCH NERVES INNERVATING LUMBAR FACET JOINT Left 4/12/2023    Procedure: Block-nerve-medial branch-lumbar L3/4,L4/5,L5/S1;  Surgeon: Chase Cody MD;  Location: [facility] OR;  Service: Pain Management;  Laterality: Left;    Melanoma Removal      OOPHORECTOMY      TOE SURGERY Left     " TONSILLECTOMY         Family History   Problem Relation Age of Onset    Thyroid disease Mother     Dementia Mother     Hypertension Mother     Arthritis Father     Breast cancer Sister 68       Social History     Socioeconomic History    Marital status:      Spouse name: Mason     Number of children: 0   Occupational History    Occupation: retired   Tobacco Use    Smoking status: Never    Smokeless tobacco: Never   Substance and Sexual Activity    Alcohol use: Yes     Alcohol/week: 6.0 - 7.0 standard drinks of alcohol     Types: 6 - 7 Glasses of wine per week     Comment: 3 or 4 a day    Drug use: No    Sexual activity: Yes     Partners: Male     Social Determinants of Health     Financial Resource Strain: Low Risk  (4/25/2023)    Overall Financial Resource Strain (CARDIA)     Difficulty of Paying Living Expenses: Not hard at all   Food Insecurity: No Food Insecurity (3/9/2023)    Hunger Vital Sign     Worried About Running Out of Food in the Last Year: Never true     Ran Out of Food in the Last Year: Never true   Transportation Needs: No Transportation Needs (3/9/2023)    PRAPARE - Transportation     Lack of Transportation (Medical): No     Lack of Transportation (Non-Medical): No   Physical Activity: Sufficiently Active (3/9/2023)    Exercise Vital Sign     Days of Exercise per Week: 4 days     Minutes of Exercise per Session: 40 min   Stress: Stress Concern Present (3/9/2023)    [employer] [place] [employer] - Occupational Stress Questionnaire     Feeling of Stress : To some extent   Social Connections: Unknown (4/25/2023)    Social Connection and Isolation Panel [NHANES]     Frequency of Communication with Friends and Family: More than three times a week     Frequency of Social Gatherings with Friends and Family: More than three times a week     Active Member of Clubs or Organizations: Yes     Attends Club or Organization Meetings: 1 to 4 times per year     Marital Status:    Housing  "Stability: Low Risk  (3/9/2023)    Housing Stability Vital Sign     Unable to Pay for Housing in the Last Year: No     Number of Places Lived in the Last Year: 1     Unstable Housing in the Last Year: No       Review of patient's allergies indicates:   Allergen Reactions    Prednisone Other (See Comments)     Blurry vision, not sure if dose was too much for her.       Review of Systems:  12 point review of systems is negative.    Physical Exam:  Vitals:    09/06/23 0930   BP: (!) 132/59   Pulse: 79   Weight: 49.5 kg (109 lb 2 oz)   Height: 5' 4" (1.626 m)   PainSc:   3   PainLoc: Back     Body mass index is 18.73 kg/m².    Gen: NAD  Psych: mood appropriate for given condition  HEENT: eyes anicteric   CV: RRR, 2+ radial pulse  HEENT: anicteric   Respiratory: non-labored, no signs of respiratory distress  Abd: non-distended  Skin: warm, dry and intact.  Gait: No antalgic gait.     Mild pain with lumbar axial facet loading, left greater than right      Sensory:  Intact and symmetrical to light touch in L1-S1 dermatomes bilaterally.    Motor:     Right Left   L2/3 Iliacus Hip flexion  5  5   L3/4 Qudratus Femoris Knee Extension  5  5   L4/5 Tib Anterior Ankle Dorsiflexion   5  5   L5/S1 Extensor Hallicus Longus Great toe extension  5  5   S1/S2 Gastroc/Soleus Plantar Flexion  5  5      Right Left                  Patellar DTR 2+ 0   Achilles DTR 0 0                      Imaging:  Xray lumbar spine 3/13/23  FINDINGS:  There is scoliosis present with convexity to the right.  This measures approximately 24° with the apex at L1-2.  No fractures are seen.  There are diffuse degenerative changes.    Xray SI joints 3/13/23  FINDINGS:  No acute displaced fracture or dislocation identified.  There are mild degenerative changes noted of the bilateral sacroiliac joints.  Sacral arcuate lines appear intact.  No radiopaque foreign body    MRI lumbar spine 03/30/2023     FINDINGS:  Morphology: Vertebral body heights are preserved.  " Prominent Modic type 1 degenerative endplate changes at L2-L3 in the setting of broad rotatory dextroscoliotic curvature discussed in further detail below.  Associated small Schmorl's nodes.  Marrow signal is otherwise within normal limits..     Alignment: Broad rotatory dextroscoliotic curvature apex at the L2-L3 level.  Grade 1 anterolisthesis of L4 on L5.     Cord: Normal in contour, caliber, and signal intensity.  Conus positioning is within normal limits.     Disc levels:     T12-L1: Within normal limits.  The spinal canal and foramina are patent.  L1-L2: Mild degenerative disc height loss and bilateral facet arthrosis.  No disc herniation.  The spinal canal and foramina remain patent.  L2-L3: Severe degenerative disc height loss asymmetric to the left.  Shallow disc bulging and moderate bilateral facet arthrosis with encroachment of both subarticular recesses, left greater than right without substantial narrowing of the spinal canal.  There is moderate left foraminal narrowing.  The right foramen is patent.  L3-L4: Mild degenerative disc height loss and desiccation with shallow disc bulging and moderate to severe facet arthrosis with ligamentum flavum thickening and small facet effusions.  Mild encroachment of both subarticular recesses.  The spinal canal remains patent.  Mild left foraminal narrowing.  The right foramen is patent.  Small facet effusions, left greater than right.  L4-L5: Severe degenerative disc height loss asymmetric to the right.  Shallow disc bulging and severe facet arthrosis with ligamentum flavum thickening and grade 1 spondylolisthesis producing mild encroachment of both subarticular recesses, right greater than left.  The spinal canal remains patent.  Mild-to-moderate right foraminal narrowing.  The left foramen is patent.  L5-S1: Mild degenerative disc height loss and moderate bilateral facet arthrosis with small facet effusions, right greater than left.  The spinal canal and  subarticular recesses as well as both foramina remain patent.     Other: Visualized paraspinal soft tissues are within normal limits.     Impression:     1. Broad rotatory dextroscoliotic curvature of the lumbar spine apex at the L2-L3 level with reactive Modic type 1 endplate changes.  2. Multifactorial degenerative changes including advanced multilevel facet arthrosis but no substantial spinal canal narrowing.  Multilevel subarticular recess narrowing as discussed above.  3. Mild to moderate right L4-L5 and moderate left L2-L3 foraminal narrowing.    Labs:  Lab Results   Component Value Date    HGBA1C 5.3 03/13/2023       Lab Results   Component Value Date    WBC 5.79 06/17/2021    HGB 14.0 06/17/2021    HCT 42.6 06/17/2021    MCV 91 06/17/2021     06/17/2021           Assessment:   Problem List Items Addressed This Visit    None  Visit Diagnoses       Lumbar spondylosis    -  Primary    Relevant Orders    Case Request Operating Room: Block-nerve-medial branch-lumbar (Completed)    Dorsalgia, unspecified                72 y.o. year old female with PMH history of transient neurologic symptoms, mitral valve prolapse who presents with back pain.  She is had chronic lower back pain for many years and has been gradually worsening.  Today she reports her back pain is primarily radiating down into the left buttock and occasionally down the outside of the left leg stopping at the knee.  She denies any numbness or weakness.  Her pain is worse with activities, walking, bending, coughing.  She is unsure of any specific thing that improves her pain as her pain is constant.    9/6/23 - Rosalia Fernández returns to the office for follow up.  Today she reports she continues to have primarily left-sided axial lower back pain.  The pain is intermittent and worse when she does activities around her house like sweeping or standing to cook a big meal.  When her pain is bothering her it gets his 6/10.  She denies any radicular  pain, numbness, weakness.      - on exam she has full strength of her lower extremities intact sensation light touch bilateral L2-S1.  Increased pain with axial facet loading on the left  - I independently reviewed her lumbar MRI and is consistent with broad rotatory dextroscoliotic curvature of the lumbar spine apex at the L2-L3 level with reactive Modic type 1 endplate changes. Multifactorial degenerative changes including advanced multilevel facet arthrosis but no substantial spinal canal narrowing.  - over the past 6 weeks she has been participating in formal physical therapy and PT directed home exercises.  She continues to report significant axial back pain that can get to 6/10 with daily household activities such as standing for a long time to cook or sweeping her floor.  This pain is limiting her mobility and interfering with her daily living.  - she avoids NSAIDs due to history of cerebral hemorrhage.  She is not finding relief with Tylenol.  She can not tolerate tramadol.  - she is status post 1st diagnostic left L3-4, L4-5, L5-S1 medial branch blocks on 4/12/23 with over 80% relief lasting for 8 hours.    - We are going to schedule for 2nd diagnostic left L3-4, L4-5, L5-S1 medial branch blocks.  The risks and benefits of this intervention, and alternative therapies were discussed with the patient.  The discussion of risks included infection, bleeding, need for additional procedures or surgery, nerve damage.  Questions regarding the procedure, risks, expected outcome, and possible side effects were solicited and answered to the patient's satisfaction.  Rosalia Fernández wishes to proceed with the injection or procedure.  Written consent was obtained.      : Reviewed       This note was completed with dictation software and grammatical errors may exist.

## 2023-09-08 ENCOUNTER — DOCUMENTATION ONLY (OUTPATIENT)
Dept: REHABILITATION | Facility: HOSPITAL | Age: 73
End: 2023-09-08
Payer: MEDICARE

## 2023-09-11 NOTE — PROGRESS NOTES
"Health  Wellness Visit Note    Name: Rosalia Fernández  Clinic Number: [id number]  Physician: Dirk Alvarado, PAAllisonC  Diagnosis: No diagnosis found.  Past Medical History:   Diagnosis Date    Colon polyps     Diverticulitis     Endometriosis     Hemorrhage     "microhemorrhage of brain"     Infertility, female     Melanoma     MVP (mitral valve prolapse)     Osteoarthritis      Visit Number: 24  Precautions: standard      1st PT visit:  5/18/23  Year of care end date:  5/18/24  Mindbody plan: Plan A - 10 months  Patient level:C    Time In: 11:00  Time Out: 12:00  Total Treatment Time: 60    [employer] 2022  Handout on this week's wellness topic provided stages of stages of change along with a discussion on what it means, the benefits, and suggestions for practice.  Reviewed last week's topic of n/a new patient .    Subjective:   Patient reports that she has a little pain today in her back, she states that she always has pain normally around a 2/10. Pt has to use a cream to help manage her pain. States for exercise she is walking 6 days a week. She walks around 40 minutes and does her stretches after. She is also icing her back everyday around 4 pm.     Objective:   Rosalia completed therapeutic stretches (EIL, PARISA) and the following MedX exercise machines: core lumbar, torso rotation l/r, leg extension, leg curl, upright row, chest press, biceps curl, triceps extension, leg press    See exercise log in patient folder for rate of exertion and repetitions completed.       Fitness Machine Education Key:  E=education on equipment initiated and further follow up and education needed  I=independent with  and exercise.  The patient:  Adjusts machines to his/her settings  Uses equipment levers, pins, weights safely  Maintains safe and correct posture while exercising  Moves through exercise with correct pace and control  Gets on and off equipment safely      Lumbar/Cervical Ext.  Torso Rotation  Leg Press "    Leg Extension  Seated Leg Curl  Chest Press    Seated Row  Hip ADD  Hip ABD    Triceps Extension  Bicep Curl  Other:      [x] Indicates exercise has been taught for home  Lumbar/Cervical Ext. [] Torso Rotation [] Leg Press []   Leg Extension [] Seated Leg Curl [] Chest Press []   Seated Row [] Hip ADD [] Hip ABD []   Triceps Extension [] Bicep Curl [] Other:        Assessment:   Patient tolerated MedX Core Lumbar Strength and all other peripheral exercises without an increase in symptoms. Patient warmed up on treadmill for 8 minutes, stretched, and iced low back for 5 minutes after the workout.     Plan:  Continue with established plan of care towards wellness goals.     Health  : Constance Sykes  9/11/2023

## 2023-09-12 ENCOUNTER — TELEPHONE (OUTPATIENT)
Dept: PAIN MEDICINE | Facility: CLINIC | Age: 73
End: 2023-09-12
Payer: MEDICARE

## 2023-09-12 NOTE — TELEPHONE ENCOUNTER
----- Message from Chantal Coulter sent at 9/11/2023 10:48 AM CDT -----  Regarding: callback  Type:  Needs Medical Advice    Who Called: Pt      Would the patient rather a call back or a response via [employer]? Callback    Best Call Back Number: [phone]    Additional Information: Pt would like a call back about a produce she as coming up. Thank you

## 2023-09-15 ENCOUNTER — DOCUMENTATION ONLY (OUTPATIENT)
Dept: REHABILITATION | Facility: HOSPITAL | Age: 73
End: 2023-09-15
Payer: MEDICARE

## 2023-09-15 NOTE — PROGRESS NOTES
"Health  Wellness Visit Note    Name: Rosalia Fernández  Clinic Number: [id number]  Physician: Dirk Alvarado, PAAllisonC  Diagnosis: No diagnosis found.  Past Medical History:   Diagnosis Date    Colon polyps     Diverticulitis     Endometriosis     Hemorrhage     "microhemorrhage of brain"     Infertility, female     Melanoma     MVP (mitral valve prolapse)     Osteoarthritis      Visit Number: 25  Precautions: standard      1st PT visit:  5/18/23  Year of care end date:  5/18/24  Mindbody plan: Plan A - 10 months  Patient level:b    Time In: 11:00  Time Out: 12:00  Total Treatment Time: 60    [employer] 2022  Handout on this week's wellness topic provided stages of stages of change along with a discussion on what it means, the benefits, and suggestions for practice.  Reviewed last week's topic of n/a new patient .    Subjective:   Patient reports that she has a little pain today in her back, she states that she always has pain normally around a 2/10. Pt has to use a cream to help manage her pain. States for exercise she is walking 6 days a week. She walks around 40 minutes and does her stretches after. She is also icing her back everyday around 4 pm.     Objective:   Rosalia completed therapeutic stretches (EIL, PARISA) and the following MedX exercise machines: core lumbar, torso rotation l/r, leg extension, leg curl, upright row, chest press, biceps curl, triceps extension, leg press    See exercise log in patient folder for rate of exertion and repetitions completed.       Fitness Machine Education Key:  E=education on equipment initiated and further follow up and education needed  I=independent with  and exercise.  The patient:  Adjusts machines to his/her settings  Uses equipment levers, pins, weights safely  Maintains safe and correct posture while exercising  Moves through exercise with correct pace and control  Gets on and off equipment safely      Lumbar/Cervical Ext.  Torso Rotation  Leg Press "    Leg Extension  Seated Leg Curl  Chest Press    Seated Row  Hip ADD  Hip ABD    Triceps Extension  Bicep Curl  Other:      [x] Indicates exercise has been taught for home  Lumbar/Cervical Ext. [] Torso Rotation [] Leg Press []   Leg Extension [] Seated Leg Curl [] Chest Press []   Seated Row [] Hip ADD [] Hip ABD []   Triceps Extension [] Bicep Curl [] Other:        Assessment:   Patient tolerated MedX Core Lumbar Strength and all other peripheral exercises without an increase in symptoms. Patient warmed up on treadmill for 8 minutes, stretched, and iced low back for 5 minutes after the workout.     Plan:  Continue with established plan of care towards wellness goals.     Health  : Constance Sykes  9/15/2023

## 2023-09-17 ENCOUNTER — TELEPHONE (OUTPATIENT)
Dept: PAIN MEDICINE | Facility: CLINIC | Age: 73
End: 2023-09-17
Payer: MEDICARE

## 2023-09-17 NOTE — TELEPHONE ENCOUNTER
----- Message from Marysol Henry sent at 9/14/2023 10:08 AM CDT -----  Contact: Patient  Type: Needs Medical Advice    Who Called:  Patient  What is this regarding?:  Pt is needing to reschedule her procedure on 9/22.  Best Call Back Number:  [phone]  Additional Information:  Please call the patient back at the phone number listed above to advise. Thank you!

## 2023-09-18 NOTE — TELEPHONE ENCOUNTER
----- Message from Maddy Salazar sent at 9/18/2023  8:36 AM CDT -----  Contact: self  Type:  Patient Returning Call    Who Called:  pt  Who Left Message for Patient:  mark  Does the patient know what this is regarding?:  yes  Best Call Back Number:  [phone]   Additional Information:  please call

## 2023-09-18 NOTE — TELEPHONE ENCOUNTER
Patient questioning if she can use her CBD cream in the days preceding the block procedure. I reassured the patient that CBD cream is perfectly fine to use. If she wants to hold off on the day of the procedure that is fine but she can use it the days leading up to then. Patient verbalized understanding.

## 2023-09-19 ENCOUNTER — TELEPHONE (OUTPATIENT)
Dept: PAIN MEDICINE | Facility: CLINIC | Age: 73
End: 2023-09-19
Payer: MEDICARE

## 2023-09-19 NOTE — TELEPHONE ENCOUNTER
----- Message from Erik Jon sent at 9/19/2023 12:46 PM CDT -----  Contact: self  Type: Needs Medical Advice  Who Called:  Patient     Best Call Back Number: [phone]    Additional Information: Pt states she would like to speak with office regarding procedure on 9/22 need to know time to arrive and if she need to fill anything out text got deleted on accident.Please call back

## 2023-09-21 ENCOUNTER — PATIENT MESSAGE (OUTPATIENT)
Dept: SURGERY | Facility: HOSPITAL | Age: 73
End: 2023-09-21
Payer: MEDICARE

## 2023-09-22 ENCOUNTER — HOSPITAL ENCOUNTER (OUTPATIENT)
Dept: RADIOLOGY | Facility: HOSPITAL | Age: 73
Discharge: HOME | End: 2023-09-22
Attending: ANESTHESIOLOGY | Admitting: ANESTHESIOLOGY
Payer: MEDICARE

## 2023-09-22 ENCOUNTER — HOSPITAL ENCOUNTER (OUTPATIENT)
Facility: HOSPITAL | Age: 73
Discharge: HOME | End: 2023-09-22
Attending: ANESTHESIOLOGY | Admitting: ANESTHESIOLOGY
Payer: MEDICARE

## 2023-09-22 VITALS
HEIGHT: 64 IN | TEMPERATURE: 98 F | BODY MASS INDEX: 18.27 KG/M2 | WEIGHT: 107 LBS | RESPIRATION RATE: 16 BRPM | HEART RATE: 78 BPM | SYSTOLIC BLOOD PRESSURE: 118 MMHG | OXYGEN SATURATION: 99 % | DIASTOLIC BLOOD PRESSURE: 58 MMHG

## 2023-09-22 DIAGNOSIS — M54.50 LOWER BACK PAIN: ICD-10-CM

## 2023-09-22 DIAGNOSIS — M47.816 LUMBAR SPONDYLOSIS: Primary | ICD-10-CM

## 2023-09-22 PROCEDURE — 64493 INJ PARAVERT F JNT L/S 1 LEV: CPT | Mod: KX,LT,, | Performed by: ANESTHESIOLOGY

## 2023-09-22 PROCEDURE — 25000003 PHARM REV CODE 250: Mod: PO | Performed by: ANESTHESIOLOGY

## 2023-09-22 PROCEDURE — 64493 INJ PARAVERT F JNT L/S 1 LEV: CPT | Mod: PO,LT | Performed by: ANESTHESIOLOGY

## 2023-09-22 PROCEDURE — 64494 INJ PARAVERT F JNT L/S 2 LEV: CPT | Mod: KX,LT,, | Performed by: ANESTHESIOLOGY

## 2023-09-22 PROCEDURE — 64495 INJ PARAVERT F JNT L/S 3 LEV: CPT | Mod: PO,LT | Performed by: ANESTHESIOLOGY

## 2023-09-22 PROCEDURE — 64493 PR INJ DX/THER AGNT PARAVERT FACET JOINT,IMG GUIDE,LUMBAR/SAC,1ST LVL: ICD-10-PCS | Mod: KX,LT,, | Performed by: ANESTHESIOLOGY

## 2023-09-22 PROCEDURE — 64495 INJ PARAVERT F JNT L/S 3 LEV: CPT | Mod: KX,LT,, | Performed by: ANESTHESIOLOGY

## 2023-09-22 PROCEDURE — 64495 PR INJ DX/THER AGNT PARAVERT FACET JOINT,IMG GUIDE,LUMBAR/SAC, ADD LEVEL: ICD-10-PCS | Mod: KX,LT,, | Performed by: ANESTHESIOLOGY

## 2023-09-22 PROCEDURE — 63600175 PHARM REV CODE 636 W HCPCS: Mod: PO | Performed by: ANESTHESIOLOGY

## 2023-09-22 PROCEDURE — 64494 INJ PARAVERT F JNT L/S 2 LEV: CPT | Mod: PO,LT | Performed by: ANESTHESIOLOGY

## 2023-09-22 PROCEDURE — 64494 PR INJ DX/THER AGNT PARAVERT FACET JOINT,IMG GUIDE,LUMBAR/SAC, 2ND LEVEL: ICD-10-PCS | Mod: KX,LT,, | Performed by: ANESTHESIOLOGY

## 2023-09-22 PROCEDURE — 76000 FLUOROSCOPY <1 HR PHYS/QHP: CPT | Mod: TC,PO

## 2023-09-22 RX ORDER — LIDOCAINE HYDROCHLORIDE 10 MG/ML
INJECTION, SOLUTION EPIDURAL; INFILTRATION; INTRACAUDAL; PERINEURAL
Status: DISCONTINUED | OUTPATIENT
Start: 2023-09-22 | End: 2023-09-22 | Stop reason: HOSPADM

## 2023-09-22 RX ORDER — MIDAZOLAM HYDROCHLORIDE 2 MG/2ML
INJECTION, SOLUTION INTRAMUSCULAR; INTRAVENOUS
Status: DISCONTINUED | OUTPATIENT
Start: 2023-09-22 | End: 2023-09-22 | Stop reason: HOSPADM

## 2023-09-22 RX ORDER — SODIUM CHLORIDE, SODIUM LACTATE, POTASSIUM CHLORIDE, CALCIUM CHLORIDE 600; 310; 30; 20 MG/100ML; MG/100ML; MG/100ML; MG/100ML
INJECTION, SOLUTION INTRAVENOUS CONTINUOUS
Status: DISCONTINUED | OUTPATIENT
Start: 2023-09-22 | End: 2023-09-22 | Stop reason: HOSPADM

## 2023-09-22 RX ADMIN — SODIUM CHLORIDE, POTASSIUM CHLORIDE, SODIUM LACTATE AND CALCIUM CHLORIDE: 600; 310; 30; 20 INJECTION, SOLUTION INTRAVENOUS at 07:09

## 2023-09-22 NOTE — OP NOTE
Procedure Note    Procedure Date: 9/22/2023    Procedure Performed:  left Diagnostic Lumbar Medial Branch @ L3/4, L4/5, L5/S1, with Fluoroscopic Guidance    Indications: The patient has clinical and radiologic findings suggestive of facet mediated pain and is s/p 1st diagnostic left lumbar L3/4, L4/5, and L5/S1 medial branch blocks with at least 80% relief of their axial back pain lasting the duration of the local anesthetic utilized.     Pre-op diagnosis: Lumbar Spondylosis    Post-op diagnosis: same    Physician: Chase Cody MD    IV Sedation medications: versed 2mg    Medications injected: 8 ml Bupivacaine 0.25%    Local anesthetic used: 1% Lidocaine, 4 ml    Estimated Blood Loss: none    Complications:  none    Technique:  The patient was interviewed in the holding area and Risks/Benefits were discussed, including, but not limited to, the possibility of new or different pain, bleeding or infection.   All questions were answered.  The patient understood and accepted risks.  Consent was reviewed.  A time out was taken to identify the patient, procedure and side of the procedure. The patient was placed in a prone position, then prepped and draped in the usual sterile fashion using ChloraPrep x2 and sterile towels.  The levels were determined under fluoroscopic guidance and then marked.  1% Lidocaine was given by raising a wheal at the skin over each site and then infiltrated approximately 2cm deeper.  A 25-gauge 3.5 inch needle was introduced to the anatomic location of the L2-5 medial branch nerves on the left side.  Then, after negative aspiration, 1.5 cc of 8 ml Bupivacaine 0.25% was injected @ each level.  The patient tolerated the procedure well.  The patient tolerated the procedure well and was transferred to the [facility] in stable condition.  The patient was monitored after the procedure.  Patient was given post procedure and discharge instructions to follow at home.  The patient was discharged in a stable  condition.    Event Time In   In Facility 0716   In Pre-Procedure 0725   Physician Available    Anesthesia Available    Pre-Op: Bedside Procedure Start    Pre-Op: Bedside Procedure Stop    Pre-Procedure Complete 0756   Out of Pre-Procedure    Anesthesia Start    Anesthesia Start Data Collection    Setup Start    Setup Complete    In Room 0815   Prep Start    Procedure Prep Complete    Procedure Start 0823   Procedure Closing    Emergence    Procedure Finish    Sedation Start 0814   Scope In    Extent Reached    Scope Out    Sedation End    Out of Room    Cleanup Start    Cleanup Complete    Cosmetic Start    Cosmetic Stop    Pain Mgmt In Room    Pain Mgmt Out Room    In Recovery    Anesthesia Finish    Bedside Procedure Start    Bedside Procedure Stop    Recovery Care Complete    Out of Recovery    To Phase II    In Phase II    Pain Mgmt Recovery Start    Pain Mgmt Recovery Stop    Obs Rec Start    Obs Rec Stop    Phase II Care Complete    Out of Phase II    Procedural Care Complete    Discharge    Pain Follow Up Needed    Pain Follow Up Complete

## 2023-09-22 NOTE — DISCHARGE SUMMARY
[facility]  Discharge Note  Short Stay    Admit Date: 9/22/2023    Discharge Date: 9/22/2023    Attending Physician: Chase Cody     Discharge Provider: Chase Cody    Diagnoses:  There are no hospital problems to display for this patient.      Discharged Condition: Good    Final Diagnoses: Lumbar spondylosis [M47.816]    Disposition: Home or Self Care    Hospital Course: No complications, uneventful    Outcome of Hospitalization, Treatment, Procedure, or Surgery:  Patient was admitted for outpatient interventional pain management procedure. The patient tolerated the procedure well with no complications.    Follow up/Patient Instructions:  Follow up as scheduled in Pain Management office in 2-3 weeks.  Patient has received instructions and follow up date and time.    Medications:  Continue previous medications    Discharge Procedure Orders   Notify your health care provider if you experience any of the following:  temperature >100.4     Notify your health care provider if you experience any of the following:  persistent nausea and vomiting or diarrhea     Notify your health care provider if you experience any of the following:  severe uncontrolled pain     Notify your health care provider if you experience any of the following:  redness, tenderness, or signs of infection (pain, swelling, redness, odor or green/yellow discharge around incision site)     Notify your health care provider if you experience any of the following:  difficulty breathing or increased cough     Notify your health care provider if you experience any of the following:  severe persistent headache     Notify your health care provider if you experience any of the following:  worsening rash     Notify your health care provider if you experience any of the following:  persistent dizziness, light-headedness, or visual disturbances     Notify your health care provider if you experience any of the following:  increased confusion or  weakness     Activity as tolerated

## 2023-09-22 NOTE — DISCHARGE INSTRUCTIONS
PAIN MANAGEMENT    HOME CARE INSTRUCTIONS   Do not use heat (such as a heating pad) for 24 hours.  You may apply an ice pack to the injection site for 20 minutes at a time for the first 24 hours for soreness/discomfort at injection site   Keep site clean and dry for 24 hours. If bandaid is present, remove when desired.   Do not drive until tomorrow.  Take care when walking after a lumbar injection.   Resume home medication as prescribed today.  Resume Aspirin, Plavix, or Coumadin the day after the procedure unless other wise instructed.        BLOCKS  Resume regular activities today.  Pain office will call in next 2 days.      CALL PHYSICIAN FOR:  Severe increase in your usual pain or the appearance of new pain.  Prolonged or increasing weakness or numbness in the legs or arms.  Fever greater than 100 degrees F.  Drainage, redness, active bleeding, or increased swelling at the injection site.  Headache that increases when your head is upright and decreases when you lie flat.    FOR EMERGENCIES:   Go directly to the emergency department for any shortness of breath, chest pain, or problems breathing.

## 2023-09-25 ENCOUNTER — TELEPHONE (OUTPATIENT)
Dept: OTOLARYNGOLOGY | Facility: CLINIC | Age: 73
End: 2023-09-25
Payer: MEDICARE

## 2023-09-25 NOTE — TELEPHONE ENCOUNTER
----- Message from Maddy Salazar sent at 9/25/2023  8:17 AM CDT -----  Contact: self  Type: Needs Medical Advice  Who Called:  pt  Symptoms (please be specific): dizzyness or crystals in ear?  How long has patient had these symptoms:   five minutes  Pharmacy name and phone #:    [facility] #[id number] - [facility] [id number] [place] [address] AT [facility] [address]  [address] [place] [address]  Phone: [phone] Fax: [phone]     Best Call Back Number: [phone]   Additional Information: please call pt states she has seen delano jordan and would like to see if she needs to makes an appt

## 2023-09-26 ENCOUNTER — TELEPHONE (OUTPATIENT)
Dept: PAIN MEDICINE | Facility: CLINIC | Age: 73
End: 2023-09-26

## 2023-09-26 ENCOUNTER — OFFICE VISIT (OUTPATIENT)
Dept: OTOLARYNGOLOGY | Facility: CLINIC | Age: 73
End: 2023-09-26
Payer: MEDICARE

## 2023-09-26 VITALS
WEIGHT: 107.38 LBS | HEIGHT: 64 IN | SYSTOLIC BLOOD PRESSURE: 135 MMHG | BODY MASS INDEX: 18.33 KG/M2 | DIASTOLIC BLOOD PRESSURE: 68 MMHG

## 2023-09-26 DIAGNOSIS — H81.11 BPPV (BENIGN PAROXYSMAL POSITIONAL VERTIGO), RIGHT: Primary | ICD-10-CM

## 2023-09-26 DIAGNOSIS — H61.21 IMPACTED CERUMEN, RIGHT EAR: ICD-10-CM

## 2023-09-26 DIAGNOSIS — H90.3 SENSORINEURAL HEARING LOSS (SNHL) OF BOTH EARS: ICD-10-CM

## 2023-09-26 DIAGNOSIS — H93.13 TINNITUS OF BOTH EARS: ICD-10-CM

## 2023-09-26 DIAGNOSIS — H69.93 ETD (EUSTACHIAN TUBE DYSFUNCTION), BILATERAL: ICD-10-CM

## 2023-09-26 PROCEDURE — 3075F PR MOST RECENT SYSTOLIC BLOOD PRESS GE 130-139MM HG: ICD-10-PCS | Mod: CPTII,S$GLB,, | Performed by: STUDENT IN AN ORGANIZED HEALTH CARE EDUCATION/TRAINING PROGRAM

## 2023-09-26 PROCEDURE — 1101F PR PT FALLS ASSESS DOC 0-1 FALLS W/OUT INJ PAST YR: ICD-10-PCS | Mod: CPTII,S$GLB,, | Performed by: STUDENT IN AN ORGANIZED HEALTH CARE EDUCATION/TRAINING PROGRAM

## 2023-09-26 PROCEDURE — 99214 PR OFFICE/OUTPT VISIT, EST, LEVL IV, 30-39 MIN: ICD-10-PCS | Mod: 25,S$GLB,, | Performed by: STUDENT IN AN ORGANIZED HEALTH CARE EDUCATION/TRAINING PROGRAM

## 2023-09-26 PROCEDURE — 3288F PR FALLS RISK ASSESSMENT DOCUMENTED: ICD-10-PCS | Mod: CPTII,S$GLB,, | Performed by: STUDENT IN AN ORGANIZED HEALTH CARE EDUCATION/TRAINING PROGRAM

## 2023-09-26 PROCEDURE — 3044F HG A1C LEVEL LT 7.0%: CPT | Mod: CPTII,S$GLB,, | Performed by: STUDENT IN AN ORGANIZED HEALTH CARE EDUCATION/TRAINING PROGRAM

## 2023-09-26 PROCEDURE — 99999 PR PBB SHADOW E&M-EST. PATIENT-LVL IV: ICD-10-PCS | Mod: PBBFAC,,, | Performed by: STUDENT IN AN ORGANIZED HEALTH CARE EDUCATION/TRAINING PROGRAM

## 2023-09-26 PROCEDURE — 3008F PR BODY MASS INDEX (BMI) DOCUMENTED: ICD-10-PCS | Mod: CPTII,S$GLB,, | Performed by: STUDENT IN AN ORGANIZED HEALTH CARE EDUCATION/TRAINING PROGRAM

## 2023-09-26 PROCEDURE — 99999 PR PBB SHADOW E&M-EST. PATIENT-LVL IV: CPT | Mod: PBBFAC,,, | Performed by: STUDENT IN AN ORGANIZED HEALTH CARE EDUCATION/TRAINING PROGRAM

## 2023-09-26 PROCEDURE — 69210 PR REMOVAL IMPACTED CERUMEN REQUIRING INSTRUMENTATION, UNILATERAL: ICD-10-PCS | Mod: S$GLB,,, | Performed by: STUDENT IN AN ORGANIZED HEALTH CARE EDUCATION/TRAINING PROGRAM

## 2023-09-26 PROCEDURE — 3078F PR MOST RECENT DIASTOLIC BLOOD PRESSURE < 80 MM HG: ICD-10-PCS | Mod: CPTII,S$GLB,, | Performed by: STUDENT IN AN ORGANIZED HEALTH CARE EDUCATION/TRAINING PROGRAM

## 2023-09-26 PROCEDURE — 3288F FALL RISK ASSESSMENT DOCD: CPT | Mod: CPTII,S$GLB,, | Performed by: STUDENT IN AN ORGANIZED HEALTH CARE EDUCATION/TRAINING PROGRAM

## 2023-09-26 PROCEDURE — 1159F PR MEDICATION LIST DOCUMENTED IN MEDICAL RECORD: ICD-10-PCS | Mod: CPTII,S$GLB,, | Performed by: STUDENT IN AN ORGANIZED HEALTH CARE EDUCATION/TRAINING PROGRAM

## 2023-09-26 PROCEDURE — 99214 OFFICE O/P EST MOD 30 MIN: CPT | Mod: 25,S$GLB,, | Performed by: STUDENT IN AN ORGANIZED HEALTH CARE EDUCATION/TRAINING PROGRAM

## 2023-09-26 PROCEDURE — 1126F AMNT PAIN NOTED NONE PRSNT: CPT | Mod: CPTII,S$GLB,, | Performed by: STUDENT IN AN ORGANIZED HEALTH CARE EDUCATION/TRAINING PROGRAM

## 2023-09-26 PROCEDURE — 3044F PR MOST RECENT HEMOGLOBIN A1C LEVEL <7.0%: ICD-10-PCS | Mod: CPTII,S$GLB,, | Performed by: STUDENT IN AN ORGANIZED HEALTH CARE EDUCATION/TRAINING PROGRAM

## 2023-09-26 PROCEDURE — 95992 CANALITH REPOSITIONING PROC: CPT | Mod: S$GLB,,, | Performed by: STUDENT IN AN ORGANIZED HEALTH CARE EDUCATION/TRAINING PROGRAM

## 2023-09-26 PROCEDURE — 3075F SYST BP GE 130 - 139MM HG: CPT | Mod: CPTII,S$GLB,, | Performed by: STUDENT IN AN ORGANIZED HEALTH CARE EDUCATION/TRAINING PROGRAM

## 2023-09-26 PROCEDURE — 1126F PR PAIN SEVERITY QUANTIFIED, NO PAIN PRESENT: ICD-10-PCS | Mod: CPTII,S$GLB,, | Performed by: STUDENT IN AN ORGANIZED HEALTH CARE EDUCATION/TRAINING PROGRAM

## 2023-09-26 PROCEDURE — 1159F MED LIST DOCD IN RCRD: CPT | Mod: CPTII,S$GLB,, | Performed by: STUDENT IN AN ORGANIZED HEALTH CARE EDUCATION/TRAINING PROGRAM

## 2023-09-26 PROCEDURE — 3008F BODY MASS INDEX DOCD: CPT | Mod: CPTII,S$GLB,, | Performed by: STUDENT IN AN ORGANIZED HEALTH CARE EDUCATION/TRAINING PROGRAM

## 2023-09-26 PROCEDURE — 95992 PR CANALITH REPOSITIONING PROCEDURE, PER DAY: ICD-10-PCS | Mod: S$GLB,,, | Performed by: STUDENT IN AN ORGANIZED HEALTH CARE EDUCATION/TRAINING PROGRAM

## 2023-09-26 PROCEDURE — 69210 REMOVE IMPACTED EAR WAX UNI: CPT | Mod: S$GLB,,, | Performed by: STUDENT IN AN ORGANIZED HEALTH CARE EDUCATION/TRAINING PROGRAM

## 2023-09-26 PROCEDURE — 3078F DIAST BP <80 MM HG: CPT | Mod: CPTII,S$GLB,, | Performed by: STUDENT IN AN ORGANIZED HEALTH CARE EDUCATION/TRAINING PROGRAM

## 2023-09-26 PROCEDURE — 1101F PT FALLS ASSESS-DOCD LE1/YR: CPT | Mod: CPTII,S$GLB,, | Performed by: STUDENT IN AN ORGANIZED HEALTH CARE EDUCATION/TRAINING PROGRAM

## 2023-09-26 RX ORDER — AZELASTINE 1 MG/ML
1 SPRAY, METERED NASAL 2 TIMES DAILY
Qty: 30 ML | Refills: 6 | Status: SHIPPED | OUTPATIENT
Start: 2023-09-26 | End: 2024-09-25

## 2023-09-26 NOTE — PROGRESS NOTES
"Otolaryngology Clinic Note    Subjective:       Patient ID: Rosalia Fernández is a 72 y.o. female.    Chief Complaint: Dizziness, Tinnitus (Clicking in ears), and Cerumen Impaction      History of Present Illness: Rosalia Fernández is a 72 y.o. female presenting with hx BPPV, last + on right in April and adjusted by Caty. Happened once prior as well. Woke up Sunday morning and room was spinning. Lasted 20 minutes or so. No N/V. Was over after that. Did not feel steady though. Has not happened again except yesterday looking at something at vet, had very short sensation. Had lumbar block Friday morning. Did get some sedation. Has also had clicking in the ears. Happened twice was lying down and noted. Hearing seems to get worse sometimes. Can be hours. Will come back. Does not notice nasal congestion. Not sure if ears can pop. Does get some runny nose but not congestion. She does wear hearing aids. She  tries to sleep on her back, mostly on the right.       Past Surgical History:   Procedure Laterality Date    COLONOSCOPY  06/29/2021    Dr. Jeanette MOSQUERA  2016    Endometriosis      HERNIA REPAIR      bladder hernia    HYSTERECTOMY      Patel, with MESH TAHBSO    INJECTION OF ANESTHETIC AGENT AROUND MEDIAL BRANCH NERVES INNERVATING LUMBAR FACET JOINT Left 4/12/2023    Procedure: Block-nerve-medial branch-lumbar L3/4,L4/5,L5/S1;  Surgeon: Chase Cody MD;  Location: [facility] OR;  Service: Pain Management;  Laterality: Left;    INJECTION OF ANESTHETIC AGENT AROUND MEDIAL BRANCH NERVES INNERVATING LUMBAR FACET JOINT Left 9/22/2023    Procedure: Block-nerve-medial branch-lumbar;  Surgeon: Chase Cody MD;  Location: [facility] OR;  Service: Pain Management;  Laterality: Left;  L3/4, L4/5, L5/S1    Melanoma Removal      OOPHORECTOMY      TOE SURGERY Left     TONSILLECTOMY       Past Medical History:   Diagnosis Date    Colon polyps     Diverticulitis     Endometriosis     Hemorrhage     "microhemorrhage of brain"     " Infertility, female     Melanoma     MVP (mitral valve prolapse)     Osteoarthritis      Social Determinants of Health     Tobacco Use: Low Risk  (9/26/2023)    Patient History     Smoking Tobacco Use: Never     Smokeless Tobacco Use: Never     Passive Exposure: Not on file   Alcohol Use: Not At Risk (9/25/2023)    AUDIT-C     Frequency of Alcohol Consumption: 4 or more times a week     Average Number of Drinks: 1 or 2     Frequency of Binge Drinking: Never   Financial Resource Strain: Low Risk  (9/25/2023)    Overall Financial Resource Strain (CARDIA)     Difficulty of Paying Living Expenses: Not hard at all   Food Insecurity: No Food Insecurity (9/25/2023)    Hunger Vital Sign     Worried About Running Out of Food in the Last Year: Never true     Ran Out of Food in the Last Year: Never true   Transportation Needs: No Transportation Needs (9/25/2023)    PRAPARE - Transportation     Lack of Transportation (Medical): No     Lack of Transportation (Non-Medical): No   Physical Activity: Sufficiently Active (9/25/2023)    Exercise Vital Sign     Days of Exercise per Week: 6 days     Minutes of Exercise per Session: 40 min   Stress: Stress Concern Present (9/25/2023)    [employer] [place] [employer] - Occupational Stress Questionnaire     Feeling of Stress : To some extent   Social Connections: Unknown (9/25/2023)    Social Connection and Isolation Panel [NHANES]     Frequency of Communication with Friends and Family: More than three times a week     Frequency of Social Gatherings with Friends and Family: More than three times a week     Attends Yazidism Services: Not on file     Active Member of Clubs or Organizations: Yes     Attends Club or Organization Meetings: More than 4 times per year     Marital Status:    Housing Stability: Low Risk  (9/25/2023)    Housing Stability Vital Sign     Unable to Pay for Housing in the Last Year: No     Number of Places Lived in the Last Year: 1     Unstable  Housing in the Last Year: No   Depression: Low Risk  (3/13/2023)    Depression     Last PHQ-4: Flowsheet Data: 0     Review of patient's allergies indicates:   Allergen Reactions    Prednisone Other (See Comments)     Blurry vision, not sure if dose was too much for her.     Current Outpatient Medications   Medication Instructions    acetaminophen (TYLENOL) 1,000 mg, Oral, Every 6 hours PRN    ascorbic acid (vitamin C) (VITAMIN C) 500 mg, Oral, Daily    azelastine (ASTELIN) 137 mcg, Nasal, 2 times daily    CALCIUM CITRATE/VITAMIN D3 (CALCIUM CITRATE + ORAL) 1 tablet, Oral, Daily    celecoxib (CELEBREX) 200 mg, Oral, 2 times daily PRN    cholecalciferol (vitamin D3) (VITAMIN D3) 2,000 Units, Oral, Daily    coQ10, ubiquinol, 100 mg Cap 1 tablet, Oral, Daily    krill oil 500 mg Cap 2 capsules, Oral, Daily    LACTOBACILLUS ACIDOPHILUS (PROBIOTIC ORAL) Oral    LIDOcaine 4 % PtMd Topical (Top), Daily PRN    meclizine (ANTIVERT) 25 mg, Oral, 3 times daily PRN    meloxicam (MOBIC) 15 mg, Oral, Daily    multivitamin (THERAGRAN) per tablet 1 tablet, Oral, Daily    PFIZER COVID BIVAL,12Y UP,,PF, 30 mcg/0.3 mL injection No dose, route, or frequency recorded.    QUICKVUE AT-HOME COVID-19 TEST Kit REFER TO  INSTRUCTIONS INCLUDED IN PACKAGING    traMADoL (ULTRAM) 50 mg, Oral, Every 12 hours PRN         ENT ROS negative except as stated above.     Patient answers are not available for this visit.            Objective:      Vitals:    09/26/23 1120   BP: 135/68       General: NAD, well appearing  Eyes: Normal conjunctiva and lids  Face: symmetric, nerve intact  Nose: The nose is without any evidence of any deformity. The nasal mucosa is moist. The septum is midline. There is no evidence of septal hematoma. The turbinates are without abnormality.   Ears: The ears are with normal-appearing pinna. Examination of the canals is normal appearing bilaterally. There is no drainage or erythema noted. The tympanic membranes are  normal appearing with pearly color, normal-appearing landmarks and normal light reflex. Hearing is grossly intact.  Mouth: No obvious abnormalities to the lips. The teeth are unremarkable. The gingivae are without any obvious evidence of infection or lesion. The oral mucosa is moist and pink. There are no obvious masses to the hard or soft palate.   Oropharynx: The uvula is midline.  The tongue is midline. The posterior pharynx is without erythema or exudate. The tonsils are normal appearing.  Salivary glands: The salivary glands are symmetric and not enlarged, no masses  Neck: No lymphadenopathy, trachea midline, thryoid not enlarged.  Psych: Normal mood and affect.   Neuro: Grossly intact  Speech: fluent    Rakesh hallpike: + to right without perfect rotary nystagmus, but with short vertigo episode. Negative to left.     Patient: Rosalia Fernández [id number], :1950  Procedure date:2023  Patient's medications, allergies, past medical, surgical, social and family histories were reviewed and updated as appropriate.  Chief Complaint:  Dizziness, Tinnitus (Clicking in ears), and Cerumen Impaction    HPI:  Rosalia is a 72 y.o. female with benign paroxysmal positional vertigo (BPPV) refractory to medical therapy. Hallpike-Chokio maneuver demonstrates nystagmus of delayed onset that fatigues, rotary, clockwise, associated with vertigo.    Procedure: Risks, benefits, and alternatives of the procedure were discussed with the patient, and the patient consented to the Epley maneuver or canalith repositioning procedure (CRP).      With the patient sitting upright on the examination table, the head was lowered to the supine position and the neck rotated 45 degrees to the right side; once nystagmus fatigued, the body and head were slowly rotated to the opposite side 90 degrees, and then after 30-60 seconds the rotation continued another 90 degrees (they rolled onto their shoulder and were looking downwards at a 45 degree  angle). After the nystagmus resolved, the patient was gently allowed to sit up with neck flexion. X 2.     There were no complications and the patient tolerated it well.  Post-procedure instructions were given.    Cheryl Samson performed the entire procedure.     Procedure:   Cerumen impaction removal  Indications: Cerumen impaction  Verbal consent obtained.   Under microscope, wax was removed from right ear using combination of suction, hook, curette instrumentation.   Patient tolerated procedure well.       Test Review: I personally reviewed audio from last month         Assessment and Plan:       1. BPPV (benign paroxysmal positional vertigo), right    2. Sensorineural hearing loss (SNHL) of both ears    3. Tinnitus of both ears    4. ETD (Eustachian tube dysfunction), bilateral            Will try astelin nasal spray for a few weeks for intermittent muffled hearing, possible ETD/allergies.     Debrox monthly- wax removed on right today. Crackling could be wax or muscle spasms or ear or ETD, or something else.     Epley to right in clinic today and at home, avoid sleeping on right next 1-2 weeks.     RTC: 2 week recheck with me or Caty    Plan of care was discussed in detail with the patient, who agreed with the plan as above. All questions were answered in detail.     Cheryl Samson MD  Otolaryngology

## 2023-09-29 ENCOUNTER — TELEPHONE (OUTPATIENT)
Dept: PAIN MEDICINE | Facility: CLINIC | Age: 73
End: 2023-09-29
Payer: MEDICARE

## 2023-09-29 ENCOUNTER — DOCUMENTATION ONLY (OUTPATIENT)
Dept: REHABILITATION | Facility: HOSPITAL | Age: 73
End: 2023-09-29
Payer: MEDICARE

## 2023-09-29 DIAGNOSIS — M47.816 LUMBAR SPONDYLOSIS: Primary | ICD-10-CM

## 2023-09-29 RX ORDER — SODIUM CHLORIDE, SODIUM LACTATE, POTASSIUM CHLORIDE, CALCIUM CHLORIDE 600; 310; 30; 20 MG/100ML; MG/100ML; MG/100ML; MG/100ML
INJECTION, SOLUTION INTRAVENOUS CONTINUOUS
Status: CANCELLED | OUTPATIENT
Start: 2023-09-29

## 2023-09-29 NOTE — TELEPHONE ENCOUNTER
----- Message from Marysol Henry sent at 9/28/2023  1:13 PM CDT -----  Contact: Patient  Type: Needs Medical Advice    Who Called:  Patient  What is this regarding?:  She had a spinal lumbar block last Friday and she is calling to give the results of how she feels.  Best Call Back Number:  [phone]  Additional Information:  Please call the patient back at the phone number listed above to advise. Thank you!

## 2023-09-29 NOTE — TELEPHONE ENCOUNTER
Spoke with patient, answers to the block questions are as follows:     1. What percentage of pain relief did you receive following the block? (0% no relief, 20% barely gone, 50% FPC gone, 80% almost gone, 100% totally gone)    80%    2. How many hours did pain relief last following the block?      8 hours    3. During this time please describe in detail the activities you were able to do?    Yard work, house work, bending, walking    4. Rate your pain 0-10 (10 being the worst pain) prior to the block procedure?    4    5. Rate your pain 0-10 (10 being the worst pain) following the block procedure (before the medication wore off)?      1

## 2023-09-29 NOTE — PROGRESS NOTES
"Health  Wellness Visit Note    Name: Rosalia Fernández  Clinic Number: [id number]  Physician: Dirk Alvarado, PAAllisonC  Diagnosis: No diagnosis found.  Past Medical History:   Diagnosis Date    Colon polyps     Diverticulitis     Endometriosis     Hemorrhage     "microhemorrhage of brain"     Infertility, female     Melanoma     MVP (mitral valve prolapse)     Osteoarthritis      Visit Number: 26  Precautions: standard      1st PT visit:  5/18/23  Year of care end date:  5/18/24  Mindbody plan: Plan A - 10 months  Patient level:b    Time In: 11:00  Time Out: 12:00  Total Treatment Time: 60    Wellness [employer] 2022  Handout on this week's wellness topic provided making and breaking habits along with a discussion on what it means, the benefits, and suggestions for practice.  Reviewed last week's topic of n/a new patient .    Subjective:   Patient reports that she has a little pain today in her back, she states that she always has pain normally around a 2/10. Pt has to use a cream to help manage her pain. States for exercise she is walking 6 days a week. She walks around 40 minutes and does her stretches after. She is also icing her back everyday around 4 pm.     Objective:   Rosaila completed therapeutic stretches (EIL, PARISA) and the following MedX exercise machines: core lumbar, torso rotation l/r, leg extension, leg curl, upright row, chest press, biceps curl, triceps extension, leg press    See exercise log in patient folder for rate of exertion and repetitions completed.       Fitness Machine Education Key:  E=education on equipment initiated and further follow up and education needed  I=independent with  and exercise.  The patient:  Adjusts machines to his/her settings  Uses equipment levers, pins, weights safely  Maintains safe and correct posture while exercising  Moves through exercise with correct pace and control  Gets on and off equipment safely      Lumbar/Cervical Ext.  Torso Rotation  Leg Press "    Leg Extension  Seated Leg Curl  Chest Press    Seated Row  Hip ADD  Hip ABD    Triceps Extension  Bicep Curl  Other:      [x] Indicates exercise has been taught for home  Lumbar/Cervical Ext. [] Torso Rotation [] Leg Press []   Leg Extension [] Seated Leg Curl [] Chest Press []   Seated Row [] Hip ADD [] Hip ABD []   Triceps Extension [] Bicep Curl [] Other:        Assessment:   Patient tolerated MedX Core Lumbar Strength and all other peripheral exercises without an increase in symptoms. Patient warmed up on treadmill for 8 minutes, stretched, and iced low back for 5 minutes after the workout.     Plan:  Continue with established plan of care towards wellness goals.     Health  : Constance Sykes  9/29/2023

## 2023-09-29 NOTE — TELEPHONE ENCOUNTER
----- Message from Paulina Amaya sent at 9/29/2023  1:05 PM CDT -----  Contact: patient  Type:  Patient Returning Call    Who Called:  patient  Who Left Message for Patient:  Jamila  Does the patient know what this is regarding?:    Best Call Back Number:  [phone] (home)   Additional Information:

## 2023-10-05 PROBLEM — H90.3 SENSORINEURAL HEARING LOSS (SNHL) OF BOTH EARS: Status: ACTIVE | Noted: 2023-10-05

## 2023-10-05 PROBLEM — M51.36 LUMBAR DEGENERATIVE DISC DISEASE: Status: ACTIVE | Noted: 2023-10-05

## 2023-10-05 PROBLEM — M51.369 LUMBAR DEGENERATIVE DISC DISEASE: Status: ACTIVE | Noted: 2023-10-05

## 2023-10-05 PROBLEM — H93.13 TINNITUS OF BOTH EARS: Status: ACTIVE | Noted: 2023-10-05

## 2023-10-05 PROBLEM — H81.11 BENIGN PAROXYSMAL POSITIONAL VERTIGO OF RIGHT EAR: Status: ACTIVE | Noted: 2023-10-05

## 2023-10-06 ENCOUNTER — DOCUMENTATION ONLY (OUTPATIENT)
Dept: REHABILITATION | Facility: HOSPITAL | Age: 73
End: 2023-10-06
Payer: MEDICARE

## 2023-10-06 NOTE — PROGRESS NOTES
"Health  Wellness Visit Note    Name: Rosalia Fernández  Clinic Number: [id number]  Physician: Dirk Alvarado, PAAllisonC  Diagnosis: No diagnosis found.  Past Medical History:   Diagnosis Date    Colon polyps     Diverticulitis     Endometriosis     Hemorrhage     "microhemorrhage of brain"     Infertility, female     Melanoma     MVP (mitral valve prolapse)     Osteoarthritis      Visit Number: 27  Precautions: standard      1st PT visit:  5/18/23  Year of care end date:  5/18/24  Mindbody plan: Plan A - 10 months  Patient level:b    Time In: 11:00  Time Out: 12:00  Total Treatment Time: 60    Wellness [employer] 2022  Handout on this week's wellness topic provided relationships  along with a discussion on what it means, the benefits, and suggestions for practice.  Reviewed last week's topic of making and breaking habits .    Subjective:   Patient reports that she has a little pain today in her back, she states that she always has pain normally around a 2/10. She has an operation soon which may help. Pt has to use a cream to help manage her pain. States for exercise she is walking 6 days a week. She walks around 40 minutes and does her stretches after. She is also icing her back everyday around 4 pm.     Objective:   Rosalia completed therapeutic stretches (EIL, PARISA) and the following MedX exercise machines: core lumbar, torso rotation l/r, leg extension, leg curl, upright row, chest press, biceps curl, triceps extension, leg press    See exercise log in patient folder for rate of exertion and repetitions completed.       Fitness Machine Education Key:  E=education on equipment initiated and further follow up and education needed  I=independent with  and exercise.  The patient:  Adjusts machines to his/her settings  Uses equipment levers, pins, weights safely  Maintains safe and correct posture while exercising  Moves through exercise with correct pace and control  Gets on and off equipment " safely      Lumbar/Cervical Ext.  Torso Rotation  Leg Press    Leg Extension  Seated Leg Curl  Chest Press    Seated Row  Hip ADD  Hip ABD    Triceps Extension  Bicep Curl  Other:      [x] Indicates exercise has been taught for home  Lumbar/Cervical Ext. [] Torso Rotation [] Leg Press []   Leg Extension [] Seated Leg Curl [] Chest Press []   Seated Row [] Hip ADD [] Hip ABD []   Triceps Extension [] Bicep Curl [] Other:        Assessment:   Patient tolerated MedX Core Lumbar Strength and all other peripheral exercises without an increase in symptoms. Patient warmed up on treadmill for 8 minutes, stretched, and iced low back for 5 minutes after the workout.     Plan:  Continue with established plan of care towards wellness goals.     Health  : Constance Sykes  10/6/2023

## 2023-10-09 ENCOUNTER — OFFICE VISIT (OUTPATIENT)
Dept: OTOLARYNGOLOGY | Facility: CLINIC | Age: 73
End: 2023-10-09
Payer: MEDICARE

## 2023-10-09 VITALS
BODY MASS INDEX: 18.48 KG/M2 | HEIGHT: 64 IN | DIASTOLIC BLOOD PRESSURE: 70 MMHG | SYSTOLIC BLOOD PRESSURE: 127 MMHG | WEIGHT: 108.25 LBS

## 2023-10-09 DIAGNOSIS — J30.0 VMR (VASOMOTOR RHINITIS): Primary | ICD-10-CM

## 2023-10-09 PROCEDURE — 99214 OFFICE O/P EST MOD 30 MIN: CPT | Mod: S$GLB,,, | Performed by: NURSE PRACTITIONER

## 2023-10-09 PROCEDURE — 3288F FALL RISK ASSESSMENT DOCD: CPT | Mod: CPTII,S$GLB,, | Performed by: NURSE PRACTITIONER

## 2023-10-09 PROCEDURE — 3008F BODY MASS INDEX DOCD: CPT | Mod: CPTII,S$GLB,, | Performed by: NURSE PRACTITIONER

## 2023-10-09 PROCEDURE — 1159F PR MEDICATION LIST DOCUMENTED IN MEDICAL RECORD: ICD-10-PCS | Mod: CPTII,S$GLB,, | Performed by: NURSE PRACTITIONER

## 2023-10-09 PROCEDURE — 1101F PR PT FALLS ASSESS DOC 0-1 FALLS W/OUT INJ PAST YR: ICD-10-PCS | Mod: CPTII,S$GLB,, | Performed by: NURSE PRACTITIONER

## 2023-10-09 PROCEDURE — 1159F MED LIST DOCD IN RCRD: CPT | Mod: CPTII,S$GLB,, | Performed by: NURSE PRACTITIONER

## 2023-10-09 PROCEDURE — 3078F DIAST BP <80 MM HG: CPT | Mod: CPTII,S$GLB,, | Performed by: NURSE PRACTITIONER

## 2023-10-09 PROCEDURE — 99999 PR PBB SHADOW E&M-EST. PATIENT-LVL IV: ICD-10-PCS | Mod: PBBFAC,,, | Performed by: NURSE PRACTITIONER

## 2023-10-09 PROCEDURE — 3044F HG A1C LEVEL LT 7.0%: CPT | Mod: CPTII,S$GLB,, | Performed by: NURSE PRACTITIONER

## 2023-10-09 PROCEDURE — 99214 PR OFFICE/OUTPT VISIT, EST, LEVL IV, 30-39 MIN: ICD-10-PCS | Mod: S$GLB,,, | Performed by: NURSE PRACTITIONER

## 2023-10-09 PROCEDURE — 3078F PR MOST RECENT DIASTOLIC BLOOD PRESSURE < 80 MM HG: ICD-10-PCS | Mod: CPTII,S$GLB,, | Performed by: NURSE PRACTITIONER

## 2023-10-09 PROCEDURE — 1160F PR REVIEW ALL MEDS BY PRESCRIBER/CLIN PHARMACIST DOCUMENTED: ICD-10-PCS | Mod: CPTII,S$GLB,, | Performed by: NURSE PRACTITIONER

## 2023-10-09 PROCEDURE — 1160F RVW MEDS BY RX/DR IN RCRD: CPT | Mod: CPTII,S$GLB,, | Performed by: NURSE PRACTITIONER

## 2023-10-09 PROCEDURE — 3074F PR MOST RECENT SYSTOLIC BLOOD PRESSURE < 130 MM HG: ICD-10-PCS | Mod: CPTII,S$GLB,, | Performed by: NURSE PRACTITIONER

## 2023-10-09 PROCEDURE — 3074F SYST BP LT 130 MM HG: CPT | Mod: CPTII,S$GLB,, | Performed by: NURSE PRACTITIONER

## 2023-10-09 PROCEDURE — 3044F PR MOST RECENT HEMOGLOBIN A1C LEVEL <7.0%: ICD-10-PCS | Mod: CPTII,S$GLB,, | Performed by: NURSE PRACTITIONER

## 2023-10-09 PROCEDURE — 99999 PR PBB SHADOW E&M-EST. PATIENT-LVL IV: CPT | Mod: PBBFAC,,, | Performed by: NURSE PRACTITIONER

## 2023-10-09 PROCEDURE — 1126F AMNT PAIN NOTED NONE PRSNT: CPT | Mod: CPTII,S$GLB,, | Performed by: NURSE PRACTITIONER

## 2023-10-09 PROCEDURE — 1126F PR PAIN SEVERITY QUANTIFIED, NO PAIN PRESENT: ICD-10-PCS | Mod: CPTII,S$GLB,, | Performed by: NURSE PRACTITIONER

## 2023-10-09 PROCEDURE — 1101F PT FALLS ASSESS-DOCD LE1/YR: CPT | Mod: CPTII,S$GLB,, | Performed by: NURSE PRACTITIONER

## 2023-10-09 PROCEDURE — 3288F PR FALLS RISK ASSESSMENT DOCUMENTED: ICD-10-PCS | Mod: CPTII,S$GLB,, | Performed by: NURSE PRACTITIONER

## 2023-10-09 PROCEDURE — 3008F PR BODY MASS INDEX (BMI) DOCUMENTED: ICD-10-PCS | Mod: CPTII,S$GLB,, | Performed by: NURSE PRACTITIONER

## 2023-10-09 NOTE — PROGRESS NOTES
"Subjective     Patient ID: Rosalia Fernández is a 72 y.o. female.    Chief Complaint: No chief complaint on file.    HPI  Patient treated for right-sided BPPV on 07/16/2021, 04/28/2023, and 09/26/2023.  Patient states she has been doing at home exercises.  No further vertigo.  Patient states Astelin nasal spray is helping with rhinorrhea.  Patient states her head does not feel right," feels fuzzy, heavy, unclear. Denies HTN or anemia. Denies diabetes. States a glass of wine helps.     Review of Systems   Constitutional: Negative.    HENT:  Positive for rhinorrhea.    Eyes: Negative.    Respiratory: Negative.     Cardiovascular: Negative.    Gastrointestinal: Negative.    Musculoskeletal: Negative.    Integumentary:  Negative.   Neurological: Negative.  Negative for dizziness and vertigo.        Reports head "doesn't feel right," feels fuzzy, heavy, unclear.    Hematological: Negative.    Psychiatric/Behavioral: Negative.            Objective     Physical Exam  Vitals and nursing note reviewed.   Constitutional:       General: She is not in acute distress.     Appearance: She is well-developed. She is not ill-appearing or diaphoretic.   HENT:      Head: Normocephalic and atraumatic.      Right Ear: Hearing, tympanic membrane, ear canal and external ear normal. No middle ear effusion. Tympanic membrane is not erythematous.      Left Ear: Hearing, tympanic membrane, ear canal and external ear normal.  No middle ear effusion. Tympanic membrane is not erythematous.      Nose: Nose normal. No mucosal edema, congestion or rhinorrhea.      Right Sinus: No maxillary sinus tenderness or frontal sinus tenderness.      Left Sinus: No maxillary sinus tenderness or frontal sinus tenderness.      Mouth/Throat:      Mouth: Mucous membranes are not pale, not dry and not cyanotic. No oral lesions.      Tongue: No lesions.      Palate: No lesions.      Pharynx: Uvula midline. No oropharyngeal exudate or posterior oropharyngeal " erythema.   Eyes:      General: Lids are normal. No scleral icterus.        Right eye: No discharge.         Left eye: No discharge.   Neck:      Thyroid: No thyroid mass or thyromegaly.      Trachea: Trachea normal. No tracheal deviation.   Cardiovascular:      Rate and Rhythm: Normal rate.   Pulmonary:      Effort: Pulmonary effort is normal. No respiratory distress.      Breath sounds: Normal air entry.   Musculoskeletal:         General: Normal range of motion.      Cervical back: Normal range of motion and neck supple.   Lymphadenopathy:      Head:      Right side of head: No submental, submandibular, tonsillar, preauricular or posterior auricular adenopathy.      Left side of head: No submental, submandibular, tonsillar, preauricular or posterior auricular adenopathy.      Cervical: No cervical adenopathy.      Right cervical: No superficial or posterior cervical adenopathy.     Left cervical: No superficial or posterior cervical adenopathy.   Skin:     General: Skin is warm and dry.      Coloration: Skin is not pale.      Findings: No lesion or rash.   Neurological:      Mental Status: She is alert and oriented to person, place, and time.      Motor: Motor function is intact.      Coordination: Coordination is intact.      Gait: Gait normal.   Psychiatric:         Attention and Perception: Attention normal.         Mood and Affect: Mood normal.         Speech: Speech normal.         Behavior: Behavior normal. Behavior is cooperative.     Negative [place]     Assessment and Plan     1. VMR (vasomotor rhinitis)      Continue Astelin for a few more weeks and if would like to then trial Atrovent nasal spray for a month, she will reach out to me.   No further BPPV remains at this time. Discussed dysequilibrium is typically multifactorial: anemia, anxiety, dehydration, hypo-/hyper-glycemia, hypo-/hyper-tension, thyroid, arrhthymia, side effects of medication, gait/mobility issues, age, headaches and other  "neurological issues, etc. Discuss with your PCP to determine stages of workup. Patient agrees to call me for any vertigo.   She has upcoming appt with eye doctor and PCP during which she will discuss "heaviness" in head sensation. Encouraged her to take her BP at home to ensure its WNL.   Patient encouraged to return to clinic if symptoms worsen/persist and as needed for further ENT symptoms or concerns.          No follow-ups on file.      "

## 2023-10-09 NOTE — PATIENT INSTRUCTIONS
""ENT-APPROVED" NASAL SPRAYS:  Flonase / fluticasone / Nasacort / Rhinocort (steroid spray) is best for stuffy, pressure, fullness. Available over-the-counter without a prescription.   Astepro / azelastine (antihistamine spray) is best for itchy, drippy, sneezy. Available over-the-counter without a prescription.   Atrovent / ipratropium is best for chronic watery nasal drip ("leaky faucet" nose), which may worsen with eating.    Use as directed, spraying 1-2 times in each nostril each day. It may take 2-3 days to 2-3 weeks to begin seeing improvement. This medication needs to be taken consistently to see results. Overall, this is a well-tolerated medication with low side effects. The benefit of nasal steroids as opposed to oral steroids is that the nasal steroid spray works primarily in the nose. Common side effects can include: headache, nasal dryness, minor nose bleed.  Rare side effects may include:  septal perforation, elevation in eye pressure, dry eyes, change in smell, allergic reaction.  Notify your provider if you have any concerns or experience these symptoms.     Nasal spray instructions:  Blow nose first gently to clean. Keep chin level with the floor (do not tilt head forward or back). Using the opposite hand (example: right hand for left nostril, left hand for right nostril) insert nasal spray taking caution to direct it AWAY from the middle wall inside the nose (septum) to avoid irritating nasal septum which could cause nosebleed.  Do not tilt spray up but rather flat and out along the roof of your mouth to spray. Angle the tip of the spray out slightly toward the direction of the ears; then spray. Do not take quick vigorous sniff but rather slow gentle inhalation while waiting for medication to absorb into nasal passages. Then administer second spray in same way.     Ponaris Nasal Emollient is used for the relief of: nasal congestion due to colds, nasal irritation, allergy exacerbations, nasal " "crusting. Specifically prepared iodized organic oils of pine, eucalyptus, peppermint, cajeput, and cottonseed. To order Ponaris: ask your pharmacist to order it for you or we carry it in our pharmacy downstairs on the first floor.       Positional vertigo: positional vertigo is caused by displaced otoconia ("crystals") that have migrated from the utricle and become lodged in the semicircular canals (canalolithiasis versus cupulolithiasis). Positional vertigo is not caused by middle ear fluid; therefore sinus/allergy meds do not correct positional vertigo. Vestibular suppressants such as Meclizine do not correct positional vertigo. The only corrective and recommended treatment for positional vertigo is canalith repositioning maneuvers to return the wayward crystals to their proper home.    If you are looking for a detailed description of BPPV, go to [email or web address]    "

## 2023-10-13 ENCOUNTER — DOCUMENTATION ONLY (OUTPATIENT)
Dept: REHABILITATION | Facility: HOSPITAL | Age: 73
End: 2023-10-13
Attending: INTERNAL MEDICINE
Payer: MEDICARE

## 2023-10-13 NOTE — PROGRESS NOTES
"Health  Wellness Visit Note    Name: Rosalia Fernández  Clinic Number: [id number]  Physician: No ref. provider found  Diagnosis: No diagnosis found.  Past Medical History:   Diagnosis Date    Colon polyps     Diverticulitis     Endometriosis     Hemorrhage     "microhemorrhage of brain"     Infertility, female     Melanoma     MVP (mitral valve prolapse)     Osteoarthritis      Visit Number: 30  Precautions: standard      1st PT visit:  5/18/23  Year of care end date:  5/18/24  Mindbody plan: Plan A - 10 months  Patient level:b    Time In: 11:00  Time Out: 12:00  Total Treatment Time: 60    [employer] 2022  Handout on this week's wellness topic provided communication  along with a discussion on what it means, the benefits, and suggestions for practice.  Reviewed last week's topic of relationships.    Subjective:   Patient reports that she has a little pain today in her back when coming on, but that has gone away from doing stretches and exercises. She states that she always has pain normally around a 2/10. She has an operation soon which may help. Pt has to use a cream to help manage her pain. States for exercise she is walking 6 days a week. She walks around 40 minutes and does her stretches after. She is also icing her back everyday around 4 pm.     Objective:   Rosalia completed therapeutic stretches (EIL, PARISA) and the following MedX exercise machines: core lumbar, torso rotation l/r, leg extension, leg curl, upright row, chest press, biceps curl, triceps extension, leg press    See exercise log in patient folder for rate of exertion and repetitions completed.       Fitness Machine Education Key:  E=education on equipment initiated and further follow up and education needed  I=independent with  and exercise.  The patient:  Adjusts machines to his/her settings  Uses equipment levers, pins, weights safely  Maintains safe and correct posture while exercising  Moves through exercise with correct " pace and control  Gets on and off equipment safely      Lumbar/Cervical Ext.  Torso Rotation  Leg Press    Leg Extension  Seated Leg Curl  Chest Press    Seated Row  Hip ADD  Hip ABD    Triceps Extension  Bicep Curl  Other:      [x] Indicates exercise has been taught for home  Lumbar/Cervical Ext. [] Torso Rotation [] Leg Press []   Leg Extension [] Seated Leg Curl [] Chest Press []   Seated Row [] Hip ADD [] Hip ABD []   Triceps Extension [] Bicep Curl [] Other:        Assessment:   Patient tolerated MedX Core Lumbar Strength and all other peripheral exercises without an increase in symptoms. Patient warmed up on treadmill for 8 minutes, stretched, and iced low back for 5 minutes after the workout.     Plan:  Continue with established plan of care towards wellness goals.     Health  : Constance Sykes  10/13/2023

## 2023-10-16 ENCOUNTER — TELEPHONE (OUTPATIENT)
Dept: PAIN MEDICINE | Facility: CLINIC | Age: 73
End: 2023-10-16
Payer: MEDICARE

## 2023-10-16 NOTE — TELEPHONE ENCOUNTER
----- Message from Paulina Amaya sent at 10/12/2023 12:11 PM CDT -----  Contact: patient  Type: Needs Medical Advice  Who Called:  patient  Best Call Back Number: [phone]  Additional Information: patient needs to know when she will be able to start back physical therapy after procedure. They are trying to schedule her appts

## 2023-10-17 ENCOUNTER — TELEPHONE (OUTPATIENT)
Dept: NEUROLOGY | Facility: CLINIC | Age: 73
End: 2023-10-17
Payer: MEDICARE

## 2023-10-17 DIAGNOSIS — R42 DIZZINESS AND GIDDINESS: Primary | ICD-10-CM

## 2023-10-17 DIAGNOSIS — G93.9 PONTINE LESION: ICD-10-CM

## 2023-10-17 NOTE — TELEPHONE ENCOUNTER
----- Message from Liat Jimenes MA sent at 10/17/2023  3:56 PM CDT -----  Type:  Patient Returning Call    Who Called:  pt  Who Left Message for Patient:  wilbert  Does the patient know what this is regarding?:  yes  Best Call Back Number:  [phone]    Additional Information:  please advise

## 2023-10-17 NOTE — TELEPHONE ENCOUNTER
----- Message from Heidi Navarro sent at 10/17/2023 11:59 AM CDT -----  Regarding: appointment  Contact: patient  Type:  Sooner Appointment Request    Caller is requesting a sooner appointment.  Caller declined first available appointment listed below.  Caller will not accept being placed on the waitlist and is requesting a message be sent to doctor.    Name of Caller:  patient  When is the first available appointment?    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 Call back  Best Call Back Number:  [phone] (home)   Additional Information:  Patient received a recall to schedule a follow up in December. She would like to come before then if possible. She is feeling like something has changed. Please call patient to advise.Thanks!

## 2023-10-17 NOTE — TELEPHONE ENCOUNTER
Scheduled patient for follow up with Dr. Nagy on 11/2/23 at 9 am. Patient reports she saw ENT for dizziness and had crystals taken care of but still has a fuzzy feeling in her head. ENT said to report to neurology.

## 2023-10-18 NOTE — TELEPHONE ENCOUNTER
Spoke with patient and scheduled MRI for 11/1/23 and lab for 10/20/23. Patient voiced understanding.

## 2023-10-19 ENCOUNTER — TELEPHONE (OUTPATIENT)
Dept: PRIMARY CARE CLINIC | Facility: CLINIC | Age: 73
End: 2023-10-19
Payer: MEDICARE

## 2023-10-19 NOTE — TELEPHONE ENCOUNTER
Spoke with pt, discussed moving her appt up from 2pm on 11/3/2023, to 1340 on 11/3/2023. Pt verbalized understanding.   Appt changed.

## 2023-10-20 ENCOUNTER — DOCUMENTATION ONLY (OUTPATIENT)
Dept: REHABILITATION | Facility: HOSPITAL | Age: 73
End: 2023-10-20
Payer: MEDICARE

## 2023-10-20 ENCOUNTER — LAB VISIT (OUTPATIENT)
Dept: LAB | Facility: HOSPITAL | Age: 73
End: 2023-10-20
Attending: PSYCHIATRY & NEUROLOGY
Payer: MEDICARE

## 2023-10-20 DIAGNOSIS — R42 DIZZINESS AND GIDDINESS: ICD-10-CM

## 2023-10-20 DIAGNOSIS — G93.9 PONTINE LESION: ICD-10-CM

## 2023-10-20 PROCEDURE — 82565 ASSAY OF CREATININE: CPT | Performed by: PSYCHIATRY & NEUROLOGY

## 2023-10-20 PROCEDURE — 36415 COLL VENOUS BLD VENIPUNCTURE: CPT | Mod: PO | Performed by: PSYCHIATRY & NEUROLOGY

## 2023-10-20 NOTE — PROGRESS NOTES
"Health  Wellness Visit Note    Name: Rosalia Fernández  Clinic Number: [id number]  Physician: Dirk Alvarado, PAAllisonC  Diagnosis: No diagnosis found.  Past Medical History:   Diagnosis Date    Colon polyps     Diverticulitis     Endometriosis     Hemorrhage     "microhemorrhage of brain"     Infertility, female     Melanoma     MVP (mitral valve prolapse)     Osteoarthritis      Visit Number: 31  Precautions: standard      1st PT visit:  5/18/23  Year of care end date:  5/18/24  Mindbody plan: Plan A - 10 months  Patient level:b    Time In: 11:00  Time Out: 12:00  Total Treatment Time: 60    [employer] 2022  Handout on this week's wellness topic provided communication  along with a discussion on what it means, the benefits, and suggestions for practice.  Reviewed last week's topic of relationships.    Subjective:   Patient reports that she has a little pain today in her back from doing yard work over the weekend, but that has gone away from doing stretches and exercises. She states that she always has pain normally around a 2/10, but lately she has had less pain.  Pt has to use a cream to help manage her pain. States for exercise she is walking 6 days a week. She walks around 40 minutes and does her stretches after. She is also icing her back everyday around 4 pm.     Objective:   Rosalia completed therapeutic stretches (EIL, PARISA) and the following MedX exercise machines: core lumbar, torso rotation l/r, leg extension, leg curl, upright row, chest press, biceps curl, triceps extension, leg press    See exercise log in patient folder for rate of exertion and repetitions completed.       Fitness Machine Education Key:  E=education on equipment initiated and further follow up and education needed  I=independent with  and exercise.  The patient:  Adjusts machines to his/her settings  Uses equipment levers, pins, weights safely  Maintains safe and correct posture while exercising  Moves through exercise " with correct pace and control  Gets on and off equipment safely      Lumbar/Cervical Ext.  Torso Rotation  Leg Press    Leg Extension  Seated Leg Curl  Chest Press    Seated Row  Hip ADD  Hip ABD    Triceps Extension  Bicep Curl  Other:      [x] Indicates exercise has been taught for home  Lumbar/Cervical Ext. [] Torso Rotation [] Leg Press []   Leg Extension [] Seated Leg Curl [] Chest Press []   Seated Row [] Hip ADD [] Hip ABD []   Triceps Extension [] Bicep Curl [] Other:        Assessment:   Patient tolerated MedX Core Lumbar Strength and all other peripheral exercises without an increase in symptoms. Patient warmed up on treadmill for 8 minutes, stretched, and iced low back for 5 minutes after the workout.     Plan:  Continue with established plan of care towards wellness goals.     Health  : Constance Sykes  10/20/2023

## 2023-10-21 LAB
CREAT SERPL-MCNC: 0.9 MG/DL (ref 0.5–1.4)
EST. GFR  (NO RACE VARIABLE): >60 ML/MIN/1.73 M^2

## 2023-10-23 ENCOUNTER — PATIENT MESSAGE (OUTPATIENT)
Dept: REHABILITATION | Facility: HOSPITAL | Age: 73
End: 2023-10-23

## 2023-10-24 ENCOUNTER — TELEPHONE (OUTPATIENT)
Dept: SURGERY | Facility: HOSPITAL | Age: 73
End: 2023-10-24
Payer: MEDICARE

## 2023-10-24 NOTE — TELEPHONE ENCOUNTER
Good Morning,    I am looking to clarify what procedure Ms. Lindsey is having tomorrow, 10/25. The consent says its a block but the schedule has her as an RFA. Please advise.    Thank you!

## 2023-10-25 ENCOUNTER — TELEPHONE (OUTPATIENT)
Dept: PAIN MEDICINE | Facility: CLINIC | Age: 73
End: 2023-10-25
Payer: MEDICARE

## 2023-10-25 ENCOUNTER — HOSPITAL ENCOUNTER (OUTPATIENT)
Dept: RADIOLOGY | Facility: HOSPITAL | Age: 73
Discharge: HOME | End: 2023-10-25
Attending: ANESTHESIOLOGY | Admitting: ANESTHESIOLOGY
Payer: MEDICARE

## 2023-10-25 ENCOUNTER — TELEPHONE (OUTPATIENT)
Dept: SURGERY | Facility: HOSPITAL | Age: 73
End: 2023-10-25
Payer: MEDICARE

## 2023-10-25 DIAGNOSIS — M54.50 LOWER BACK PAIN: ICD-10-CM

## 2023-10-25 NOTE — TELEPHONE ENCOUNTER
Patients procedure cancelled today due to MD sickness. Please reach out to reschedule patients procedure. Thanks!

## 2023-10-27 ENCOUNTER — DOCUMENTATION ONLY (OUTPATIENT)
Dept: REHABILITATION | Facility: HOSPITAL | Age: 73
End: 2023-10-27
Payer: MEDICARE

## 2023-10-30 ENCOUNTER — DOCUMENTATION ONLY (OUTPATIENT)
Dept: REHABILITATION | Facility: HOSPITAL | Age: 73
End: 2023-10-30
Payer: MEDICARE

## 2023-10-30 NOTE — PROGRESS NOTES
"Health  Wellness Visit Note    Name: Rosalia Fernández  Clinic Number: [id number]  Physician: Dirk Alvarado, PAAllisonC  Diagnosis: No diagnosis found.  Past Medical History:   Diagnosis Date    Colon polyps     Diverticulitis     Endometriosis     Hemorrhage     "microhemorrhage of brain"     Infertility, female     Melanoma     MVP (mitral valve prolapse)     Osteoarthritis      Visit Number: 31  Precautions: standard      1st PT visit:  5/18/23  Year of care end date:  5/18/24  Mindbody plan: Plan A - 10 months  Patient level:b    Time In: 11:00  Time Out: 12:00  Total Treatment Time: 60    Well[employer] 2022  Handout on this week's wellness topic provided communication  along with a discussion on what it means, the benefits, and suggestions for practice.  Reviewed last week's topic of relationships.    Subjective:   Patient reports that she has a little pain today in her back from doing yard work over the weekend, but that has gone away from doing stretches and exercises. She states that she always has pain normally around a 2/10, but lately she has had less pain.  Pt has to use a cream to help manage her pain. States for exercise she is walking 6 days a week. She walks around 40 minutes and does her stretches after. She is also icing her back everyday around 4 pm.     Objective:   Rosalia completed therapeutic stretches (EIL, PARISA) and the following MedX exercise machines: core lumbar, torso rotation l/r, leg extension, leg curl, upright row, chest press, biceps curl, triceps extension, leg press    See exercise log in patient folder for rate of exertion and repetitions completed.       Fitness Machine Education Key:  E=education on equipment initiated and further follow up and education needed  I=independent with  and exercise.  The patient:  Adjusts machines to his/her settings  Uses equipment levers, pins, weights safely  Maintains safe and correct posture while exercising  Moves through exercise " with correct pace and control  Gets on and off equipment safely      Lumbar/Cervical Ext.  Torso Rotation  Leg Press    Leg Extension  Seated Leg Curl  Chest Press    Seated Row  Hip ADD  Hip ABD    Triceps Extension  Bicep Curl  Other:      [x] Indicates exercise has been taught for home  Lumbar/Cervical Ext. [] Torso Rotation [] Leg Press []   Leg Extension [] Seated Leg Curl [] Chest Press []   Seated Row [] Hip ADD [] Hip ABD []   Triceps Extension [] Bicep Curl [] Other:        Assessment:   Patient tolerated MedX Core Lumbar Strength and all other peripheral exercises without an increase in symptoms. Patient warmed up on treadmill for 8 minutes, stretched, and iced low back for 5 minutes after the workout.     Plan:  Continue with established plan of care towards wellness goals.     Health  : Constance Sykes  10/30/2023

## 2023-10-30 NOTE — PROGRESS NOTES
"Health  Wellness Visit Note    Name: Rosalia Fernández  Clinic Number: [id number]  Physician: Dirk Alvarado, PAAllisonC  Diagnosis: No diagnosis found.  Past Medical History:   Diagnosis Date    Colon polyps     Diverticulitis     Endometriosis     Hemorrhage     "microhemorrhage of brain"     Infertility, female     Melanoma     MVP (mitral valve prolapse)     Osteoarthritis      Visit Number: 32  Precautions: standard      1st PT visit:  5/18/23  Year of care end date:  5/18/24  Mindbody plan: Plan A - 10 months  Patient level:b    Time In: 11:00  Time Out: 12:00  Total Treatment Time: 60    Well[employer] 2022  Handout on this week's wellness topic provided sleep requirements along with a discussion on what it means, the benefits, and suggestions for practice.  Reviewed last week's topic of communication  .    Subjective:   Patient reports that she has a little pain today in her back from doing yard work over the weekend, but that has gone away from doing stretches and exercises. She states that she always has pain normally around a 2/10, but lately she has had less pain.  Pt has to use a cream to help manage her pain. States for exercise she is walking 6 days a week. She walks around 40 minutes and does her stretches after. She is also icing her back everyday around 4 pm.     Objective:   Rosalia completed therapeutic stretches (EIL, PARISA) and the following MedX exercise machines: core lumbar, torso rotation l/r, leg extension, leg curl, upright row, chest press, biceps curl, triceps extension, leg press    See exercise log in patient folder for rate of exertion and repetitions completed.       Fitness Machine Education Key:  E=education on equipment initiated and further follow up and education needed  I=independent with  and exercise.  The patient:  Adjusts machines to his/her settings  Uses equipment levers, pins, weights safely  Maintains safe and correct posture while exercising  Moves through " exercise with correct pace and control  Gets on and off equipment safely      Lumbar/Cervical Ext.  Torso Rotation  Leg Press    Leg Extension  Seated Leg Curl  Chest Press    Seated Row  Hip ADD  Hip ABD    Triceps Extension  Bicep Curl  Other:      [x] Indicates exercise has been taught for home  Lumbar/Cervical Ext. [] Torso Rotation [] Leg Press []   Leg Extension [] Seated Leg Curl [] Chest Press []   Seated Row [] Hip ADD [] Hip ABD []   Triceps Extension [] Bicep Curl [] Other:        Assessment:   Patient tolerated MedX Core Lumbar Strength and all other peripheral exercises without an increase in symptoms. Patient warmed up on treadmill for 8 minutes, stretched, and iced low back for 5 minutes after the workout.     Plan:  Continue with established plan of care towards wellness goals.     Health  : Constance Sykes  10/30/2023

## 2023-11-01 ENCOUNTER — HOSPITAL ENCOUNTER (OUTPATIENT)
Dept: RADIOLOGY | Facility: HOSPITAL | Age: 73
Discharge: HOME | End: 2023-11-01
Attending: PSYCHIATRY & NEUROLOGY
Payer: MEDICARE

## 2023-11-01 ENCOUNTER — TELEPHONE (OUTPATIENT)
Dept: NEUROLOGY | Facility: CLINIC | Age: 73
End: 2023-11-01
Payer: MEDICARE

## 2023-11-01 DIAGNOSIS — R42 DIZZINESS AND GIDDINESS: ICD-10-CM

## 2023-11-01 PROCEDURE — 70553 MRI BRAIN STEM W/O & W/DYE: CPT | Mod: 26,,, | Performed by: RADIOLOGY

## 2023-11-01 PROCEDURE — A9585 GADOBUTROL INJECTION: HCPCS | Mod: PO | Performed by: PSYCHIATRY & NEUROLOGY

## 2023-11-01 PROCEDURE — 25500020 PHARM REV CODE 255: Mod: PO | Performed by: PSYCHIATRY & NEUROLOGY

## 2023-11-01 PROCEDURE — 70553 MRI BRAIN W WO CONTRAST: ICD-10-PCS | Mod: 26,,, | Performed by: RADIOLOGY

## 2023-11-01 PROCEDURE — 70553 MRI BRAIN STEM W/O & W/DYE: CPT | Mod: TC,PO

## 2023-11-01 RX ORDER — GADOBUTROL 604.72 MG/ML
4 INJECTION INTRAVENOUS
Status: COMPLETED | OUTPATIENT
Start: 2023-11-01 | End: 2023-11-01

## 2023-11-01 RX ADMIN — GADOBUTROL 4 ML: 604.72 INJECTION INTRAVENOUS at 10:11

## 2023-11-02 ENCOUNTER — OFFICE VISIT (OUTPATIENT)
Dept: NEUROLOGY | Facility: CLINIC | Age: 73
End: 2023-11-02
Payer: MEDICARE

## 2023-11-02 VITALS
WEIGHT: 108.56 LBS | HEART RATE: 80 BPM | HEIGHT: 64 IN | SYSTOLIC BLOOD PRESSURE: 120 MMHG | DIASTOLIC BLOOD PRESSURE: 57 MMHG | BODY MASS INDEX: 18.53 KG/M2

## 2023-11-02 DIAGNOSIS — R93.89 ABNORMAL FINDINGS ON DIAGNOSTIC IMAGING OF OTHER SPECIFIED BODY STRUCTURES: ICD-10-CM

## 2023-11-02 DIAGNOSIS — R42 DIZZINESS AND GIDDINESS: Primary | ICD-10-CM

## 2023-11-02 DIAGNOSIS — R20.8 OTHER DISTURBANCES OF SKIN SENSATION: ICD-10-CM

## 2023-11-02 PROCEDURE — 1160F PR REVIEW ALL MEDS BY PRESCRIBER/CLIN PHARMACIST DOCUMENTED: ICD-10-PCS | Mod: CPTII,S$GLB,, | Performed by: PSYCHIATRY & NEUROLOGY

## 2023-11-02 PROCEDURE — 99999 PR PBB SHADOW E&M-EST. PATIENT-LVL III: ICD-10-PCS | Mod: PBBFAC,,, | Performed by: PSYCHIATRY & NEUROLOGY

## 2023-11-02 PROCEDURE — 99999 PR PBB SHADOW E&M-EST. PATIENT-LVL III: CPT | Mod: PBBFAC,,, | Performed by: PSYCHIATRY & NEUROLOGY

## 2023-11-02 PROCEDURE — 3044F HG A1C LEVEL LT 7.0%: CPT | Mod: CPTII,S$GLB,, | Performed by: PSYCHIATRY & NEUROLOGY

## 2023-11-02 PROCEDURE — 3044F PR MOST RECENT HEMOGLOBIN A1C LEVEL <7.0%: ICD-10-PCS | Mod: CPTII,S$GLB,, | Performed by: PSYCHIATRY & NEUROLOGY

## 2023-11-02 PROCEDURE — 3288F FALL RISK ASSESSMENT DOCD: CPT | Mod: CPTII,S$GLB,, | Performed by: PSYCHIATRY & NEUROLOGY

## 2023-11-02 PROCEDURE — 1160F RVW MEDS BY RX/DR IN RCRD: CPT | Mod: CPTII,S$GLB,, | Performed by: PSYCHIATRY & NEUROLOGY

## 2023-11-02 PROCEDURE — 1101F PR PT FALLS ASSESS DOC 0-1 FALLS W/OUT INJ PAST YR: ICD-10-PCS | Mod: CPTII,S$GLB,, | Performed by: PSYCHIATRY & NEUROLOGY

## 2023-11-02 PROCEDURE — 99214 PR OFFICE/OUTPT VISIT, EST, LEVL IV, 30-39 MIN: ICD-10-PCS | Mod: S$GLB,,, | Performed by: PSYCHIATRY & NEUROLOGY

## 2023-11-02 PROCEDURE — 3288F PR FALLS RISK ASSESSMENT DOCUMENTED: ICD-10-PCS | Mod: CPTII,S$GLB,, | Performed by: PSYCHIATRY & NEUROLOGY

## 2023-11-02 PROCEDURE — 3074F PR MOST RECENT SYSTOLIC BLOOD PRESSURE < 130 MM HG: ICD-10-PCS | Mod: CPTII,S$GLB,, | Performed by: PSYCHIATRY & NEUROLOGY

## 2023-11-02 PROCEDURE — 1125F AMNT PAIN NOTED PAIN PRSNT: CPT | Mod: CPTII,S$GLB,, | Performed by: PSYCHIATRY & NEUROLOGY

## 2023-11-02 PROCEDURE — 3078F PR MOST RECENT DIASTOLIC BLOOD PRESSURE < 80 MM HG: ICD-10-PCS | Mod: CPTII,S$GLB,, | Performed by: PSYCHIATRY & NEUROLOGY

## 2023-11-02 PROCEDURE — 3008F BODY MASS INDEX DOCD: CPT | Mod: CPTII,S$GLB,, | Performed by: PSYCHIATRY & NEUROLOGY

## 2023-11-02 PROCEDURE — 3078F DIAST BP <80 MM HG: CPT | Mod: CPTII,S$GLB,, | Performed by: PSYCHIATRY & NEUROLOGY

## 2023-11-02 PROCEDURE — 3074F SYST BP LT 130 MM HG: CPT | Mod: CPTII,S$GLB,, | Performed by: PSYCHIATRY & NEUROLOGY

## 2023-11-02 PROCEDURE — 99214 OFFICE O/P EST MOD 30 MIN: CPT | Mod: S$GLB,,, | Performed by: PSYCHIATRY & NEUROLOGY

## 2023-11-02 PROCEDURE — 1159F MED LIST DOCD IN RCRD: CPT | Mod: CPTII,S$GLB,, | Performed by: PSYCHIATRY & NEUROLOGY

## 2023-11-02 PROCEDURE — 1101F PT FALLS ASSESS-DOCD LE1/YR: CPT | Mod: CPTII,S$GLB,, | Performed by: PSYCHIATRY & NEUROLOGY

## 2023-11-02 PROCEDURE — 1159F PR MEDICATION LIST DOCUMENTED IN MEDICAL RECORD: ICD-10-PCS | Mod: CPTII,S$GLB,, | Performed by: PSYCHIATRY & NEUROLOGY

## 2023-11-02 PROCEDURE — 3008F PR BODY MASS INDEX (BMI) DOCUMENTED: ICD-10-PCS | Mod: CPTII,S$GLB,, | Performed by: PSYCHIATRY & NEUROLOGY

## 2023-11-02 PROCEDURE — 1125F PR PAIN SEVERITY QUANTIFIED, PAIN PRESENT: ICD-10-PCS | Mod: CPTII,S$GLB,, | Performed by: PSYCHIATRY & NEUROLOGY

## 2023-11-02 NOTE — PROGRESS NOTES
Date: 11/2/2023    Patient ID: Rosalia Fernández is a 72 y.o. female.    Chief Complaint: Dizziness (improved)      History of Present Illness:  Ms. Fernández is a 72 y.o. 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     She had intermittent heaviness in her head. This was bitemporal. She denies head pain. It would happen intermittently for hours at a time daily for a few months. No arm heaviness. No vision changes. No passing out. She didn't check her blood pressure with this. This feeling has resolved.      She had a hearing test and had hearing loss and had hearing aids adjusted.         Prior documentation:   At the end of September 28, 2018, she had an episode of bilateral arm weakness. She was playing golf and had sudden onset of bilateral arm weakness. This was severe and she couldn't lift her hands up even to touch her nose. No numbness. The left arm was affected more so but couldn't get either up to her nose. It lasted a few minutes and then resolved completely. No vision changes. No speech changes. No leg weakness. No pain.      The next episode occurred October 8, 2018. She was shopping and just suddenly had bilateral arm weakness. It was not as severe but again lasted less than 5 minutes and resolved. She went to ER and CT head was negative.      Workup revealed no c-spine abnormality that would explain the arm weakness. I thought initially this might have been a TIA of the spinal cord and recommended aspirin 81 mg daily. MRA revealed no concerning stenosis or cause. 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 Because of this and risk for bleeding, I advised her to stop the aspirin we started previously.      She has not had any further episodes of arm weakness. Her  " notes she has had some mid back pain and she picks up things that are too heavy for her.      We repeated the MRI of the brain in June 2019 which showed a stable pontine capillary telangiectasia. No further episodes of arm weakness.     Allergies:  Review of patient's allergies indicates:   Allergen Reactions    Celebrex [celecoxib]      Fuzzy feeling    Prednisone Other (See Comments)     Blurry vision, not sure if dose was too much for her.    Tramadol      Fuzzy feeling       Current Medications:  Current Outpatient Medications   Medication Sig Dispense Refill    acetaminophen (TYLENOL) 500 MG tablet Take 1,000 mg by mouth every 6 (six) hours as needed for Pain.      ascorbic acid (VITAMIN C) 500 MG tablet Take 500 mg by mouth once daily.      azelastine (ASTELIN) 137 mcg (0.1 %) nasal spray 1 spray (137 mcg total) by Nasal route 2 (two) times daily. 30 mL 6    CALCIUM CITRATE/VITAMIN D3 (CALCIUM CITRATE + ORAL) Take 1 tablet by mouth Daily.      cholecalciferol, vitamin D3, (VITAMIN D3) 50 mcg (2,000 unit) Cap capsule Take 2,000 Units by mouth once daily.      coQ10, ubiquinol, 100 mg Cap Take 1 tablet by mouth once daily.      krill oil 500 mg Cap Take 2 capsules by mouth Daily.      LACTOBACILLUS ACIDOPHILUS (PROBIOTIC ORAL) Take 1 tablet by mouth Daily.      multivitamin (THERAGRAN) per tablet Take 1 tablet by mouth once daily.      UNABLE TO FIND CBD cream Apply topically bid      LIDOcaine 4 % PtMd Apply topically daily as needed.       No current facility-administered medications for this visit.       Past Medical History:  Past Medical History:   Diagnosis Date    Colon polyps     Diverticulitis     Endometriosis     Hemorrhage     "microhemorrhage of brain"     Infertility, female     Melanoma     MVP (mitral valve prolapse)     Osteoarthritis        Past Surgical History:  Past Surgical History:   Procedure Laterality Date    COLONOSCOPY  06/29/2021    Dr. Jeanette MOSQUERA  2016    Endometriosis   " "   HERNIA REPAIR      bladder hernia    HYSTERECTOMY      Patel, with MESH TAHBSO    INJECTION OF ANESTHETIC AGENT AROUND MEDIAL BRANCH NERVES INNERVATING LUMBAR FACET JOINT Left 4/12/2023    Procedure: Block-nerve-medial branch-lumbar L3/4,L4/5,L5/S1;  Surgeon: Chase Cody MD;  Location: [facility] OR;  Service: Pain Management;  Laterality: Left;    INJECTION OF ANESTHETIC AGENT AROUND MEDIAL BRANCH NERVES INNERVATING LUMBAR FACET JOINT Left 9/22/2023    Procedure: Block-nerve-medial branch-lumbar;  Surgeon: Chase Cody MD;  Location: [facility] OR;  Service: Pain Management;  Laterality: Left;  L3/4, L4/5, L5/S1    Melanoma Removal      OOPHORECTOMY      TOE SURGERY Left     TONSILLECTOMY         Family History:  family history includes Arthritis in her father; Breast cancer (age of onset: 68) in her sister; Dementia in her mother; Hypertension in her mother; Thyroid disease in her mother.    Social History:   reports that she has never smoked. She has never used smokeless tobacco. She reports current alcohol use of about 7.0 standard drinks of alcohol per week. She reports that she does not use drugs.    Physical Exam:  Vitals:    11/02/23 0903   BP: (!) 120/57   Pulse: 80   Weight: 49.2 kg (108 lb 9.2 oz)   Height: 5' 4" (1.626 m)   PainSc:   2   PainLoc: Back     Body mass index is 18.64 kg/m².    Neurological Exam:  Mental status: Awake and alert  Speech language: No dysarthria or aphasia on conversation  Cranial nerves: Face symmetric  Motor: Moves all extremities well  Coordination: No ataxia. No tremor.      Data:  I have personally reviewed other provider's notes, labs, & imaging made available to me today.     Labs:  CBC:   Lab Results   Component Value Date    WBC 4.60 09/26/2023    HGB 14.7 09/26/2023    HCT 44.6 09/26/2023     09/26/2023    MCV 93 09/26/2023    RDW 11.9 09/26/2023     BMP:   Lab Results   Component Value Date     09/26/2023    K 4.5 09/26/2023     09/26/2023    CO2 32 " "(H) 09/26/2023    BUN 21 (H) 09/26/2023    CREATININE 0.9 10/20/2023     (H) 09/26/2023    CALCIUM 9.6 09/26/2023    MG 2.0 10/08/2018    PHOS 3.8 10/08/2018     LFTS;   Lab Results   Component Value Date    PROT 6.6 09/26/2023    ALBUMIN 4.1 09/26/2023    BILITOT 0.5 09/26/2023    AST 26 09/26/2023    ALKPHOS 66 09/26/2023    ALT 20 09/26/2023     COAGS: No results found for: "INR", "PROTIME", "PTT"  FLP:   Lab Results   Component Value Date    CHOL 200 (H) 09/26/2023    HDL 55 09/26/2023    LDLCALC 123.8 09/26/2023    TRIG 106 09/26/2023    CHOLHDL 27.5 09/26/2023       Imaging:  I have personally reviewed the imaging, MRI brain is stable.     Assessment and Plan:  Ms. Fernández is a 72 y.o. female here for followup. The symptoms have resolved. Very nonspecific description so hard to know what caused it. Exam is normal today. MRI brain looks stable. Will check a couple of labs including B12 and TSH. Advised she check her BP if symptoms reoccur.     Dizziness and giddiness  -     TSH; Future; Expected date: 11/02/2023  -     Vitamin B12; Future; Expected date: 11/02/2023  -     FOLATE; Future; Expected date: 11/02/2023    Abnormal findings on diagnostic imaging of other specified body structures  -     TSH; Future; Expected date: 11/02/2023    Other disturbances of skin sensation  -     Vitamin B12; Future; Expected date: 11/02/2023  -     FOLATE; Future; Expected date: 11/02/2023           "

## 2023-11-03 ENCOUNTER — TELEPHONE (OUTPATIENT)
Dept: NEUROLOGY | Facility: CLINIC | Age: 73
End: 2023-11-03
Payer: MEDICARE

## 2023-11-03 ENCOUNTER — OFFICE VISIT (OUTPATIENT)
Dept: PRIMARY CARE CLINIC | Facility: CLINIC | Age: 73
End: 2023-11-03
Payer: MEDICARE

## 2023-11-03 VITALS
RESPIRATION RATE: 16 BRPM | BODY MASS INDEX: 19.74 KG/M2 | DIASTOLIC BLOOD PRESSURE: 76 MMHG | OXYGEN SATURATION: 98 % | SYSTOLIC BLOOD PRESSURE: 132 MMHG | WEIGHT: 107.25 LBS | HEART RATE: 86 BPM | HEIGHT: 62 IN

## 2023-11-03 DIAGNOSIS — H81.11 BENIGN PAROXYSMAL POSITIONAL VERTIGO OF RIGHT EAR: ICD-10-CM

## 2023-11-03 DIAGNOSIS — M85.89 OSTEOPENIA OF MULTIPLE SITES: ICD-10-CM

## 2023-11-03 DIAGNOSIS — R53.83 FATIGUE, UNSPECIFIED TYPE: ICD-10-CM

## 2023-11-03 DIAGNOSIS — M79.601 ARM PAIN, ANTERIOR, RIGHT: ICD-10-CM

## 2023-11-03 DIAGNOSIS — H90.3 SENSORINEURAL HEARING LOSS (SNHL) OF BOTH EARS: ICD-10-CM

## 2023-11-03 DIAGNOSIS — R20.2 LEFT HAND PARESTHESIA: ICD-10-CM

## 2023-11-03 DIAGNOSIS — E55.9 VITAMIN D DEFICIENCY: ICD-10-CM

## 2023-11-03 DIAGNOSIS — E78.2 MIXED HYPERLIPIDEMIA: ICD-10-CM

## 2023-11-03 DIAGNOSIS — F33.9 DEPRESSION, RECURRENT: ICD-10-CM

## 2023-11-03 DIAGNOSIS — H93.13 TINNITUS OF BOTH EARS: ICD-10-CM

## 2023-11-03 DIAGNOSIS — G93.9 PONTINE LESION: ICD-10-CM

## 2023-11-03 DIAGNOSIS — Z76.89 ENCOUNTER TO ESTABLISH CARE: Primary | ICD-10-CM

## 2023-11-03 DIAGNOSIS — R29.90 EPISODE OF TRANSIENT NEUROLOGIC SYMPTOMS: ICD-10-CM

## 2023-11-03 DIAGNOSIS — Z85.820 HISTORY OF MELANOMA: ICD-10-CM

## 2023-11-03 DIAGNOSIS — I34.1 MVP (MITRAL VALVE PROLAPSE): ICD-10-CM

## 2023-11-03 DIAGNOSIS — R73.03 PREDIABETES: ICD-10-CM

## 2023-11-03 DIAGNOSIS — M51.36 LUMBAR DEGENERATIVE DISC DISEASE: ICD-10-CM

## 2023-11-03 PROCEDURE — 99204 PR OFFICE/OUTPT VISIT, NEW, LEVL IV, 45-59 MIN: ICD-10-PCS | Mod: S$GLB,,, | Performed by: INTERNAL MEDICINE

## 2023-11-03 PROCEDURE — 99204 OFFICE O/P NEW MOD 45 MIN: CPT | Mod: S$GLB,,, | Performed by: INTERNAL MEDICINE

## 2023-11-03 PROCEDURE — 1125F PR PAIN SEVERITY QUANTIFIED, PAIN PRESENT: ICD-10-PCS | Mod: CPTII,S$GLB,, | Performed by: INTERNAL MEDICINE

## 2023-11-03 PROCEDURE — 1160F PR REVIEW ALL MEDS BY PRESCRIBER/CLIN PHARMACIST DOCUMENTED: ICD-10-PCS | Mod: CPTII,S$GLB,, | Performed by: INTERNAL MEDICINE

## 2023-11-03 PROCEDURE — 3008F BODY MASS INDEX DOCD: CPT | Mod: CPTII,S$GLB,, | Performed by: INTERNAL MEDICINE

## 2023-11-03 PROCEDURE — 1158F PR ADVANCE CARE PLANNING DISCUSS DOCUMENTED IN MEDICAL RECORD: ICD-10-PCS | Mod: CPTII,S$GLB,, | Performed by: INTERNAL MEDICINE

## 2023-11-03 PROCEDURE — 3044F HG A1C LEVEL LT 7.0%: CPT | Mod: CPTII,S$GLB,, | Performed by: INTERNAL MEDICINE

## 2023-11-03 PROCEDURE — 3078F PR MOST RECENT DIASTOLIC BLOOD PRESSURE < 80 MM HG: ICD-10-PCS | Mod: CPTII,S$GLB,, | Performed by: INTERNAL MEDICINE

## 2023-11-03 PROCEDURE — 3075F SYST BP GE 130 - 139MM HG: CPT | Mod: CPTII,S$GLB,, | Performed by: INTERNAL MEDICINE

## 2023-11-03 PROCEDURE — 3044F PR MOST RECENT HEMOGLOBIN A1C LEVEL <7.0%: ICD-10-PCS | Mod: CPTII,S$GLB,, | Performed by: INTERNAL MEDICINE

## 2023-11-03 PROCEDURE — 1159F MED LIST DOCD IN RCRD: CPT | Mod: CPTII,S$GLB,, | Performed by: INTERNAL MEDICINE

## 2023-11-03 PROCEDURE — 3288F FALL RISK ASSESSMENT DOCD: CPT | Mod: CPTII,S$GLB,, | Performed by: INTERNAL MEDICINE

## 2023-11-03 PROCEDURE — 1160F RVW MEDS BY RX/DR IN RCRD: CPT | Mod: CPTII,S$GLB,, | Performed by: INTERNAL MEDICINE

## 2023-11-03 PROCEDURE — 1101F PR PT FALLS ASSESS DOC 0-1 FALLS W/OUT INJ PAST YR: ICD-10-PCS | Mod: CPTII,S$GLB,, | Performed by: INTERNAL MEDICINE

## 2023-11-03 PROCEDURE — 1101F PT FALLS ASSESS-DOCD LE1/YR: CPT | Mod: CPTII,S$GLB,, | Performed by: INTERNAL MEDICINE

## 2023-11-03 PROCEDURE — 1159F PR MEDICATION LIST DOCUMENTED IN MEDICAL RECORD: ICD-10-PCS | Mod: CPTII,S$GLB,, | Performed by: INTERNAL MEDICINE

## 2023-11-03 PROCEDURE — 3288F PR FALLS RISK ASSESSMENT DOCUMENTED: ICD-10-PCS | Mod: CPTII,S$GLB,, | Performed by: INTERNAL MEDICINE

## 2023-11-03 PROCEDURE — 1125F AMNT PAIN NOTED PAIN PRSNT: CPT | Mod: CPTII,S$GLB,, | Performed by: INTERNAL MEDICINE

## 2023-11-03 PROCEDURE — 1158F ADVNC CARE PLAN TLK DOCD: CPT | Mod: CPTII,S$GLB,, | Performed by: INTERNAL MEDICINE

## 2023-11-03 PROCEDURE — 99999 PR PBB SHADOW E&M-EST. PATIENT-LVL IV: ICD-10-PCS | Mod: PBBFAC,,, | Performed by: INTERNAL MEDICINE

## 2023-11-03 PROCEDURE — 99999 PR PBB SHADOW E&M-EST. PATIENT-LVL IV: CPT | Mod: PBBFAC,,, | Performed by: INTERNAL MEDICINE

## 2023-11-03 PROCEDURE — 3075F PR MOST RECENT SYSTOLIC BLOOD PRESS GE 130-139MM HG: ICD-10-PCS | Mod: CPTII,S$GLB,, | Performed by: INTERNAL MEDICINE

## 2023-11-03 PROCEDURE — 3008F PR BODY MASS INDEX (BMI) DOCUMENTED: ICD-10-PCS | Mod: CPTII,S$GLB,, | Performed by: INTERNAL MEDICINE

## 2023-11-03 PROCEDURE — 3078F DIAST BP <80 MM HG: CPT | Mod: CPTII,S$GLB,, | Performed by: INTERNAL MEDICINE

## 2023-11-03 RX ORDER — CREATINE 100 %
POWDER (GRAM) MISCELLANEOUS
COMMUNITY
End: 2024-03-18

## 2023-11-03 NOTE — TELEPHONE ENCOUNTER
----- Message from Figueroa Peters sent at 11/3/2023 11:44 AM CDT -----  Contact: Self  Type:  Patient Returning Call    Who Called:  Patient  Who Left Message for Patient:  Paulina  Does the patient know what this is regarding?:  Yes  Best Call Back Number:  [phone]   Additional Information:

## 2023-11-03 NOTE — PROGRESS NOTES
"[facility]      CHIEF COMPLAINT     Chief Complaint   Patient presents with    [facility]     Discuss upcoming back procedure   Discuss recent CT/SCAN AND MRI performed this week  Toe issues  Discuss physical therapy exercises and states see notices stomach bulges with exercise  Chronic pain - right arm and restricting movement for comfort (pt has not done PT for that)and back  Discuss G/I issues diarrhea and watery stool vis 1 mo  Consult collagen intake    Tension Headache     Fuzziness and pressure   Discuss ear crystals       HPI     Rosalia Fernández is a 72 y.o.  female who presents with a PMHx of  diverticulitis, melanoma, MVP, osteoarthritis. Who presents today to [facility].     Her main complaints and concerns are: lots of medical concerns including but not limited to   Has a scheduled ablation for her back   Bitemporal pressures: has been seeing neurology. MRI shows no changes   Anxiety CARLEEN 7 18/21. Admittedly anxious but says due to her back issues which took a while          CHRONIC HEALTH ISSUES:   Past Medical History:  Past Medical History:   Diagnosis Date    Colon polyps     Diverticulitis     Endometriosis     Hemorrhage     "microhemorrhage of brain"     Infertility, female     Melanoma     MVP (mitral valve prolapse)     Osteoarthritis          Geriatric Assessment    ADLs : independent  iADLs: independent    Polypharmacy:no     Visual impairments: no changes    Hearing impairment: wears hearing aids     Urinary incontinence: no     Malnutrition: no    Gait/balance/falls: no falls.     Depression: PHQ2. Normal but very anxious based on GAD7. Decliens medication. Declines the services of the LCSW.     Sleep:     Cognitive Problems: minicog.5/5.     Environmental/social problems:     Functional status:     Support system:     Advanced care planning:  does not have on file. But discussed at length today and she will fill out the paperwork and return it to " clinic to be scanned in.    Date: 11/03/2023    Power of   I initiated the process of voluntary advance care planning today and explained the importance of this process to the patient.  I introduced the concept of advance directives to the patient, as well. Then the patient received detailed information about the importance of designating a Health Care Power of  (HCPOA). She was also instructed to communicate with this person about their wishes for future healthcare, should she become sick and lose decision-making capacity. The patient has not previously appointed a HCPOA. After our discussion, the patient has not decided to complete a HCPOA             Worry score 1    Past Surgical History:  Past Surgical History:   Procedure Laterality Date    COLONOSCOPY  06/29/2021    Dr. Jeanette MOSQUERA  2016    Endometriosis      HERNIA REPAIR      bladder hernia    HYSTERECTOMY      Patel, with MESH TAHBSO    INJECTION OF ANESTHETIC AGENT AROUND MEDIAL BRANCH NERVES INNERVATING LUMBAR FACET JOINT Left 4/12/2023    Procedure: Block-nerve-medial branch-lumbar L3/4,L4/5,L5/S1;  Surgeon: Chase Cody MD;  Location: [facility] OR;  Service: Pain Management;  Laterality: Left;    INJECTION OF ANESTHETIC AGENT AROUND MEDIAL BRANCH NERVES INNERVATING LUMBAR FACET JOINT Left 9/22/2023    Procedure: Block-nerve-medial branch-lumbar;  Surgeon: Chase Cody MD;  Location: [facility] OR;  Service: Pain Management;  Laterality: Left;  L3/4, L4/5, L5/S1    Melanoma Removal      OOPHORECTOMY      TOE SURGERY Left     TONSILLECTOMY         Allergies:  Review of patient's allergies indicates:   Allergen Reactions    Aspirin Other (See Comments)     Risk of brain bleed    Celebrex [celecoxib]      Fuzzy feeling    Prednisone Other (See Comments)     Blurry vision, not sure if dose was too much for her.    Tramadol      Fuzzy feeling       Home Medications:  Prior to Admission medications    Medication Sig Start Date End Date Taking?  Authorizing Provider   acetaminophen (TYLENOL) 500 MG tablet Take 1,000 mg by mouth every 6 (six) hours as needed for Pain.    Provider, Historical   ascorbic acid (VITAMIN C) 500 MG tablet Take 500 mg by mouth once daily.    Provider, Historical   azelastine (ASTELIN) 137 mcg (0.1 %) nasal spray 1 spray (137 mcg total) by Nasal route 2 (two) times daily. 9/26/23 9/25/24  Cheryl Samson MD   CALCIUM CITRATE/VITAMIN D3 (CALCIUM CITRATE + ORAL) Take 1 tablet by mouth Daily.    Provider, Historical   cholecalciferol, vitamin D3, (VITAMIN D3) 50 mcg (2,000 unit) Cap capsule Take 2,000 Units by mouth once daily.    Provider, Historical   coQ10, ubiquinol, 100 mg Cap Take 1 tablet by mouth once daily.    Provider, Historical   krill oil 500 mg Cap Take 2 capsules by mouth Daily.    Provider, Historical   LACTOBACILLUS ACIDOPHILUS (PROBIOTIC ORAL) Take 1 tablet by mouth Daily.    Provider, Historical   LIDOcaine 4 % PtMd Apply topically daily as needed.    Provider, Historical   multivitamin (THERAGRAN) per tablet Take 1 tablet by mouth once daily.    Provider, Historical   UNABLE TO FIND CBD cream Apply topically bid    Provider, Historical       Family History:  Family History   Problem Relation Age of Onset    Thyroid disease Mother     Alzheimer's disease Mother     Hypertension Mother     Arthritis Father     Dementia Father     Breast cancer Sister 68    Stroke Sister     Hypertension Sister     No Known Problems Brother     No Known Problems Maternal Grandmother     No Known Problems Maternal Grandfather     No Known Problems Paternal Grandmother     No Known Problems Paternal Grandfather        Social History:  Social History     Tobacco Use    Smoking status: Never    Smokeless tobacco: Never   Substance Use Topics    Alcohol use: Yes     Alcohol/week: 7.0 standard drinks of alcohol     Types: 7 Glasses of wine per week     Comment: 7 glasses of a week    Drug use: No       Review of  "Systems:  ROS      PHYSICAL EXAM     /76 (BP Location: Right arm, Patient Position: Sitting, BP Method: Medium (Manual))   Pulse 86   Resp 16   Ht 5' 2" (1.575 m)   Wt 48.6 kg (107 lb 4.1 oz)   SpO2 98%   BMI 19.62 kg/m²     GEN - A+OX4, NAD   HEENT - PERRL, EOMI, OP clear. MMM.   Neck - No thyromegaly or cervical LAD. No thyroid masses felt.  CV - RRR, no m/r   Chest - CTAB, no wheezing or rhonchi  Abd - S/NT/ND/+BS.   Ext - 2+BDP and radial pulses. No LE edema.   Neuro - PERRL, EOMI, no nystagmus, eyebrow raise, facial sensation, hearing, m of mastication, smile, palatal raise, shoulder shrug, tongue protrusion symmetric and intact. 5/5 BUE and BLE strength. Sensation to light touch intact throughout. 2+ DTRs. Normal gait.   MSK - No spinal tenderness to palpation. Normal gait.   Skin - No rash.    LABS     Previous labs reviewed.      ASSESSMENT/PLAN     Rosalia Fernández is a 72 y.o. female with  1. Encounter to establish care  -medications reviewed and consolidated  -reviewed PMH, medications, HCM including vaccinations.   -reviewed most recent lab work. Reordered as needed. Discussed abnormalities with patient with time allowed for questions.   -reviewed clinic policy with patient including to arrive 15 mins before appointments, labs and results including expected turn around for results.   -outside records and consultants notes reviewed as time allowed. Updated treatment team with name of other providers.   -reviewed and confirmed patients contact information, preferred pharmacy etc.   -AVS provided after visit to summarized things discussed.   -The following assessments were completed:  Living Situation  CAGE  Depression Screening  Timed Get Up and Go  Cognitive Function Screening-Minicog   Nutrition Screening  ADL Screening      2. Lumbar degenerative disc disease  Overview:  Stable  Advised tylenola s needed and water aerobics       3. Left hand paresthesia  Overview:  Resolves/ " "episodic      4. Benign paroxysmal positional vertigo of right ear  Overview:  Resolved   Encouraged head excercises      5. Episode of transient neurologic symptoms  Overview:  Follows with neurology. Says was instructed not to take ASA du eto bleeding risk.   Details having episodes where she cannot lift both arms       6. Tinnitus of both ears    7. Sensorineural hearing loss (SNHL) of both ears  Overview:  With hearing aids which work well for her       8. MVP (mitral valve prolapse)  Overview:  Asymptomatic  Continue to monitor      9. Vitamin D deficiency  Overview:  Continue with vitamin D supplementation     Orders:  -     CBC Auto Differential; Future; Expected date: 11/03/2023    10. Osteopenia of multiple sites  Overview:  Encouraged calcium and vitamin D  Encouraged gait  Building excercises and fall prevention    Orders:  -     CBC Auto Differential; Future; Expected date: 11/03/2023    11. Prediabetes  Overview:  Counseled on limiting carbs in her diet     Orders:  -     Hemoglobin A1C; Future; Expected date: 11/03/2023    12. Fatigue, unspecified typ  -     CBC Auto Differential; Future; Expected date: 11/03/2023  -     Comprehensive Metabolic Panel; Future; Expected date: 11/03/2023  -     TSH; Future; Expected date: 11/03/2023    13. Mixed hyperlipidemia  -     Lipid Panel; Future; Expected date: 11/03/2023    14. Arm pain, anterior, right  -     Ambulatory referral/consult to Physical/Occupational Therapy; Future; Expected date: 11/10/2023    15. Depression, recurrent  Overview:  Declines this diagnosis. Says its more stress and anxiety  Denies SI/HI  Declines medication and therapy. "doesn't need it"       16. 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         17. History of melanoma    Overview:  Sees dermatology every 6 months. Continue   Avoid excessive sun exposure.   Wear sunscreen and hat when outsid e           ACP: discussion had " about ACP to include definition of ACP, HCP, CODE STATUS. Paperwork handed to patient, to review, discuss with family and return it to clinic to be scanned into the chart.   Advance Care Planning       My total time spent on this encounter was at least 60 minutes which included  the following activities: preparing to see the patient, performing a medically appropriate and/or evaluation, counseling and educating the patient and family/caregiver, ordering medications, tests, or procedures, referring and communicating with other healthcare providers, documenting clinical information in the electronic or other health record. This time is independent and non-overlapping.         RTC in 6 months, sooner if needed and depending on labs.    Sayda Barnhart MD  Board Certified Internist/Geriatrician  [facility] [place] [address] ([place])

## 2023-11-03 NOTE — TELEPHONE ENCOUNTER
----- Message from Trey Kapadia sent at 11/3/2023  9:37 AM CDT -----  Regarding: advice  Contact: TERESA MADRIGAL [[id number]]  Type: Needs Medical Advice  Who Called:  Teresa    Symptoms (please be specific):  Short term Memory, can't remember what she said    How long has patient had these symptoms:  aayush    Pharmacy name and phone #:  aayush    Best Call Back Number: [phone]     Additional Information: Please call to advise.

## 2023-11-03 NOTE — TELEPHONE ENCOUNTER
Patient called to let Dr. Nagy she forgot to mention at her appointment that she sometimes forgets what she is saying when speaking to people. Patient states does not happen, daily or weekly just every once in a while but could not elaborate on a frequency. Advised will make Dr. Nagy aware in case she would like to evaluate it at her next appointment

## 2023-11-06 ENCOUNTER — LAB VISIT (OUTPATIENT)
Dept: LAB | Facility: HOSPITAL | Age: 73
End: 2023-11-06
Attending: PSYCHIATRY & NEUROLOGY
Payer: MEDICARE

## 2023-11-06 DIAGNOSIS — R93.89 ABNORMAL FINDINGS ON DIAGNOSTIC IMAGING OF OTHER SPECIFIED BODY STRUCTURES: ICD-10-CM

## 2023-11-06 DIAGNOSIS — R42 DIZZINESS AND GIDDINESS: ICD-10-CM

## 2023-11-06 DIAGNOSIS — R20.8 OTHER DISTURBANCES OF SKIN SENSATION: ICD-10-CM

## 2023-11-06 LAB
FOLATE SERPL-MCNC: 16.7 NG/ML (ref 4–24)
TSH SERPL DL<=0.005 MIU/L-ACNC: 1.21 UIU/ML (ref 0.4–4)
VIT B12 SERPL-MCNC: 800 PG/ML (ref 210–950)

## 2023-11-06 PROCEDURE — 82607 VITAMIN B-12: CPT | Performed by: PSYCHIATRY & NEUROLOGY

## 2023-11-06 PROCEDURE — 82746 ASSAY OF FOLIC ACID SERUM: CPT | Performed by: PSYCHIATRY & NEUROLOGY

## 2023-11-06 PROCEDURE — 36415 COLL VENOUS BLD VENIPUNCTURE: CPT | Mod: PO | Performed by: PSYCHIATRY & NEUROLOGY

## 2023-11-06 PROCEDURE — 84443 ASSAY THYROID STIM HORMONE: CPT | Performed by: PSYCHIATRY & NEUROLOGY

## 2023-11-07 ENCOUNTER — TELEPHONE (OUTPATIENT)
Dept: PRIMARY CARE CLINIC | Facility: CLINIC | Age: 73
End: 2023-11-07
Payer: MEDICARE

## 2023-11-07 NOTE — TELEPHONE ENCOUNTER
Clinician contacted pt regarding referral for behavioral health. Pt was explained behavioral health services and asked if she/he would be interested. Pt declined need for services and was encouraged fo follow up with clinician if needed.

## 2023-11-07 NOTE — TELEPHONE ENCOUNTER
Clinician contacted pt regarding referral for behavioral health. Contact attempted at [phone]. There was no answer and  voice mail was left asking him/her to return the call. Clinician will continue to follow up.     Yes

## 2023-11-10 ENCOUNTER — DOCUMENTATION ONLY (OUTPATIENT)
Dept: REHABILITATION | Facility: HOSPITAL | Age: 73
End: 2023-11-10
Payer: MEDICARE

## 2023-11-10 NOTE — PROGRESS NOTES
"Health  Wellness Visit Note    Name: Rosalia Fernández  Clinic Number: [id number]  Physician: Sayda Barnhart MD  Diagnosis: No diagnosis found.  Past Medical History:   Diagnosis Date    Colon polyps     Diverticulitis     Endometriosis     Hemorrhage     "microhemorrhage of brain"     Infertility, female     Melanoma     MVP (mitral valve prolapse)     Osteoarthritis      Visit Number: [id number]  Precautions: standard      1st PT visit:  5/18/23  Year of care end date:  5/18/24  Mindbody plan: Plan A - 10 months  Patient level:b    Time In: 11:00  Time Out: 12:00  Total Treatment Time: 60    [employer] 2022  Handout on this week's wellness topic provided sleep requirements along with a discussion on what it means, the benefits, and suggestions for practice.  Reviewed last week's topic of  sleep requirements  .    Subjective:   Patient reports that she has a little pain today in her back from doing yard work over the weekend, but that has gone away from doing stretches and exercises. She states that she always has pain normally around a 2/10, but lately she has had less pain.  Pt has to use a cream to help manage her pain. States for exercise she is walking 6 days a week. She walks around 40 minutes and does her stretches after. She is also icing her back everyday around 4 pm. She is having to skip two weeks due to the holidays.     Objective:   Rosalia completed therapeutic stretches (EIL, PARISA) and the following MedX exercise machines: core lumbar, torso rotation l/r, leg extension, leg curl, upright row, chest press, biceps curl, triceps extension, leg press    See exercise log in patient folder for rate of exertion and repetitions completed.       Fitness Machine Education Key:  E=education on equipment initiated and further follow up and education needed  I=independent with  and exercise.  The patient:  Adjusts machines to his/her settings  Uses equipment levers, pins, weights " safely  Maintains safe and correct posture while exercising  Moves through exercise with correct pace and control  Gets on and off equipment safely      Lumbar/Cervical Ext.  Torso Rotation  Leg Press    Leg Extension  Seated Leg Curl  Chest Press    Seated Row  Hip ADD  Hip ABD    Triceps Extension  Bicep Curl  Other:      [x] Indicates exercise has been taught for home  Lumbar/Cervical Ext. [] Torso Rotation [] Leg Press []   Leg Extension [] Seated Leg Curl [] Chest Press []   Seated Row [] Hip ADD [] Hip ABD []   Triceps Extension [] Bicep Curl [] Other:        Assessment:   Patient tolerated MedX Core Lumbar Strength and all other peripheral exercises without an increase in symptoms. Patient warmed up on treadmill for 8 minutes, stretched, and iced low back for 5 minutes after the workout.     Plan:  Continue with established plan of care towards wellness goals.     Health  : Constance Sykes  11/10/2023

## 2023-11-13 ENCOUNTER — TELEPHONE (OUTPATIENT)
Dept: PRIMARY CARE CLINIC | Facility: CLINIC | Age: 73
End: 2023-11-13
Payer: MEDICARE

## 2023-11-13 NOTE — TELEPHONE ENCOUNTER
Farida Traylor  Program: [facility] Generic Post-Discharge  MRN: [id number]  Attribution_Plan: [place] [employer]  YOB: 1960    The patient exceeded risk threshold YELLOW on Fri, 10 Mar 2023 14:20:48   GMT based on established flags    Question(s) with flags that caused the Alert (up to last 5 values   recorded)    Question: Is there a reason you weren't able to keep this appointment?     Date:     2023-03-10     Color:    red     Answers:  Other reasons    Question: Were you able to see your primary care doctor for a follow-up   appointment?     Date:     2023-03-10     Color:    red     Answers:  No     Message forwarded to Arabella Childress PT.

## 2023-11-13 NOTE — TELEPHONE ENCOUNTER
----- Message from Sheryl Springer sent at 11/13/2023  9:16 AM CST -----  Contact: [phone]  Patient needs to reschedule her PT appointment. Please call and advise.    Thank you and have a great day.

## 2023-11-14 ENCOUNTER — TELEPHONE (OUTPATIENT)
Dept: SURGERY | Facility: HOSPITAL | Age: 73
End: 2023-11-14
Payer: MEDICARE

## 2023-11-14 NOTE — TELEPHONE ENCOUNTER
Good afternoon,    Ms. Fernández states she will need to adjust her follow-up appointment after tomorrow's procedure with Dr. Cody. Please contact her to discuss.     Thank you!

## 2023-11-15 ENCOUNTER — HOSPITAL ENCOUNTER (OUTPATIENT)
Facility: HOSPITAL | Age: 73
Discharge: HOME | End: 2023-11-15
Attending: ANESTHESIOLOGY | Admitting: ANESTHESIOLOGY
Payer: MEDICARE

## 2023-11-15 ENCOUNTER — HOSPITAL ENCOUNTER (OUTPATIENT)
Dept: RADIOLOGY | Facility: HOSPITAL | Age: 73
Discharge: HOME | End: 2023-11-15
Attending: ANESTHESIOLOGY | Admitting: ANESTHESIOLOGY
Payer: MEDICARE

## 2023-11-15 VITALS
WEIGHT: 107 LBS | RESPIRATION RATE: 18 BRPM | TEMPERATURE: 98 F | OXYGEN SATURATION: 98 % | HEART RATE: 75 BPM | SYSTOLIC BLOOD PRESSURE: 128 MMHG | BODY MASS INDEX: 18.27 KG/M2 | DIASTOLIC BLOOD PRESSURE: 62 MMHG | HEIGHT: 64 IN

## 2023-11-15 DIAGNOSIS — M54.50 LOWER BACK PAIN: ICD-10-CM

## 2023-11-15 DIAGNOSIS — M47.816 LUMBAR SPONDYLOSIS: Primary | ICD-10-CM

## 2023-11-15 PROCEDURE — 25000003 PHARM REV CODE 250: Mod: PO | Performed by: ANESTHESIOLOGY

## 2023-11-15 PROCEDURE — 64636 DESTROY L/S FACET JNT ADDL: CPT | Mod: LT,,, | Performed by: ANESTHESIOLOGY

## 2023-11-15 PROCEDURE — 63600175 PHARM REV CODE 636 W HCPCS: Mod: PO | Performed by: ANESTHESIOLOGY

## 2023-11-15 PROCEDURE — 64635 DESTROY LUMB/SAC FACET JNT: CPT | Mod: PO,LT | Performed by: ANESTHESIOLOGY

## 2023-11-15 PROCEDURE — 76000 FLUOROSCOPY <1 HR PHYS/QHP: CPT | Mod: TC,PO

## 2023-11-15 PROCEDURE — 64635 PR DESTROY LUMB/SAC FACET JNT: ICD-10-PCS | Mod: LT,,, | Performed by: ANESTHESIOLOGY

## 2023-11-15 PROCEDURE — 64635 DESTROY LUMB/SAC FACET JNT: CPT | Mod: LT,,, | Performed by: ANESTHESIOLOGY

## 2023-11-15 PROCEDURE — 64636 PR DESTROY L/S FACET JNT ADDL: ICD-10-PCS | Mod: LT,,, | Performed by: ANESTHESIOLOGY

## 2023-11-15 PROCEDURE — 99152 PR MOD CONSCIOUS SEDATION, SAME PHYS, 5+ YRS, FIRST 15 MIN: ICD-10-PCS | Mod: ,,, | Performed by: ANESTHESIOLOGY

## 2023-11-15 PROCEDURE — 64636 DESTROY L/S FACET JNT ADDL: CPT | Mod: PO,LT | Performed by: ANESTHESIOLOGY

## 2023-11-15 PROCEDURE — 99152 MOD SED SAME PHYS/QHP 5/>YRS: CPT | Mod: ,,, | Performed by: ANESTHESIOLOGY

## 2023-11-15 RX ORDER — SODIUM CHLORIDE, SODIUM LACTATE, POTASSIUM CHLORIDE, CALCIUM CHLORIDE 600; 310; 30; 20 MG/100ML; MG/100ML; MG/100ML; MG/100ML
INJECTION, SOLUTION INTRAVENOUS CONTINUOUS
Status: DISCONTINUED | OUTPATIENT
Start: 2023-11-15 | End: 2023-11-15 | Stop reason: HOSPADM

## 2023-11-15 RX ORDER — BUPIVACAINE HYDROCHLORIDE 2.5 MG/ML
INJECTION, SOLUTION EPIDURAL; INFILTRATION; INTRACAUDAL
Status: DISCONTINUED | OUTPATIENT
Start: 2023-11-15 | End: 2023-11-15 | Stop reason: HOSPADM

## 2023-11-15 RX ORDER — TRIAMCINOLONE ACETONIDE 40 MG/ML
INJECTION, SUSPENSION INTRA-ARTICULAR; INTRAMUSCULAR
Status: DISCONTINUED | OUTPATIENT
Start: 2023-11-15 | End: 2023-11-15 | Stop reason: HOSPADM

## 2023-11-15 RX ORDER — LIDOCAINE HYDROCHLORIDE 20 MG/ML
INJECTION, SOLUTION EPIDURAL; INFILTRATION; INTRACAUDAL; PERINEURAL
Status: DISCONTINUED | OUTPATIENT
Start: 2023-11-15 | End: 2023-11-15 | Stop reason: HOSPADM

## 2023-11-15 RX ORDER — LIDOCAINE HYDROCHLORIDE 10 MG/ML
INJECTION, SOLUTION EPIDURAL; INFILTRATION; INTRACAUDAL; PERINEURAL
Status: DISCONTINUED | OUTPATIENT
Start: 2023-11-15 | End: 2023-11-15 | Stop reason: HOSPADM

## 2023-11-15 NOTE — DISCHARGE SUMMARY
[facility]  Discharge Note  Short Stay    Admit Date: 11/15/2023    Discharge Date: 11/15/2023    Attending Physician: Chase Cody     Discharge Provider: Chase Cody    Diagnoses:  There are no hospital problems to display for this patient.      Discharged Condition: Good    Final Diagnoses: Lumbar spondylosis [M47.816]    Disposition: Home or Self Care    Hospital Course: No complications, uneventful    Outcome of Hospitalization, Treatment, Procedure, or Surgery:  Patient was admitted for outpatient interventional pain management procedure. The patient tolerated the procedure well with no complications.    Follow up/Patient Instructions:  Follow up as scheduled in Pain Management office in 2-3 weeks.  Patient has received instructions and follow up date and time.    Medications:  Continue previous medications    Discharge Procedure Orders   Notify your health care provider if you experience any of the following:  temperature >100.4     Notify your health care provider if you experience any of the following:  persistent nausea and vomiting or diarrhea     Notify your health care provider if you experience any of the following:  severe uncontrolled pain     Notify your health care provider if you experience any of the following:  redness, tenderness, or signs of infection (pain, swelling, redness, odor or green/yellow discharge around incision site)     Notify your health care provider if you experience any of the following:  difficulty breathing or increased cough     Notify your health care provider if you experience any of the following:  severe persistent headache     Notify your health care provider if you experience any of the following:  worsening rash     Notify your health care provider if you experience any of the following:  persistent dizziness, light-headedness, or visual disturbances     Notify your health care provider if you experience any of the following:  increased confusion or  weakness     Activity as tolerated

## 2023-11-15 NOTE — OP NOTE
Procedure Note    Procedure Date: 11/15/2023    Procedure Performed:  Radiofrequency ablation of the L3/4, L4/5, L5/S1 medial branch nerves on the  left side utilizing fluoroscopy    Indication: The patient has clinical and radiologic findings suggestive of facet mediated pain and is s/p 2nd diagnostic left lumbar L3/4, L4/5, and L5/S1 medial branch blocks with at least 80% relief of their axial back pain lasting the duration of the local anesthetic utilized.     Pre-op diagnosis: Lumbar Spondylosis    Post-op diagnosis: same    Physician: Chase Cody MD    IV Sedation medications: Moderate sedation was achieved with midazolam 2 mg  Continuous monitoring of EKG, blood pressure and pulse oximetry was provided by a registered nurse during the entire course of the procedure under my supervision and recorded in the patient's medical record.   Total time for sedation was 25 minutes.    Medications injected:  Pre-lesion, 1ml of 2% lidocaine at each level.  From a mixture of 5ml of 0.25% bupivacaine and 40mg of methylprednisolone, 1ml of this solution was injected at each level post-lesion.    Local anesthetic used: 1% Lidocaine, 4 ml    Estimated Blood Loss: none    Complications:  none    Technique:  The patient was interviewed in the holding area and Risks/Benefits were discussed, including, but not limited to, the possibility of new or different pain, bleeding or infection.   All questions were answered.  The patient understood and accepted risks.  Consent was reviewed.  A time out was taken to identify the patient, procedure and side of the procedure. The patient was placed in a prone position, then prepped and draped in the usual sterile fashion using ChloraPrep x2 and sterile towels.  The levels were determined under fluoroscopic guidance and then marked.  AP and oblique fluoroscopy were used to identify and shelley the junctions between the superior articular processes and transverse processes at the L2-5 levels on  the Left side.  The Left sacral ala was also identified and marked.  The skin and subcutaneous tissues in these identified areas were anesthetized with 1% lidocaine. A 20-gauge 20 mm [employer] [place] RF needle was advanced towards each of these points under fluoroscopic guidance such that the tip of the needle was positioned posterior to the Neuro-foramen on lateral fluoroscopic view.  Then, each needle was positioned such that, on stimulation, the patient had an appropriate pain response between 0.3-0.5 V, with no motor response, other than Lumbar Paraspinal Muscles up to 2.0V.  One mL of 2% lidocaine was then injected at each level prior to lesioning, which was performed for 90 seconds at 80°C.  Once the lesion was complete, 1 mL of a solution consisting of 5 mL 0.25% bupivacaine and 40mg methylprednisolone was injected through each probe. The probes were removed with a 1% lidocaine flush.  The patient tolerated the procedure well.  The patient was monitored after the procedure.  Patient was given post procedure and discharge instructions to follow at home.  The patient was discharged in a stable condition.    Event Time In   In Facility 1342   In Pre-Procedure 1400   Physician Available    Anesthesia Available    Pre-Op: Bedside Procedure Start    Pre-Op: Bedside Procedure Stop    Pre-Procedure Complete 1443   Out of Pre-Procedure    Anesthesia Start    Anesthesia Start Data Collection    Setup Start    Setup Complete    In Room 1528   Prep Start    Procedure Prep Complete    Procedure Start 1536   Procedure Closing    Emergence    Procedure Finish 1552   Sedation Start 1527   Scope In    Extent Reached    Scope Out    Sedation End 1552   Out of Room 1555   Cleanup Start    Cleanup Complete    Cosmetic Start    Cosmetic Stop    Pain Mgmt In Room    Pain Mgmt Out Room    In Recovery    Anesthesia Finish    Bedside Procedure Start    Bedside Procedure Stop    Recovery Care Complete    Out of Recovery    To Phase II    In  Phase II    Pain Mgmt Recovery Start    Pain Mgmt Recovery Stop    Obs Rec Start    Obs Rec Stop    Phase II Care Complete    Out of Phase II    Procedural Care Complete    Discharge    Pain Follow Up Needed    Pain Follow Up Complete

## 2023-11-15 NOTE — H&P
"[facility] - Surgery  History & Physical - Short Stay  Pain Management       SUBJECTIVE:     Procedure: Procedure(s) (LRB):  ABLATION, NERVE, FACET JOINT, LUMBAR, MEDIAL BRANCH L3/4, L4/5, L5/S1 (Left)    Chief Complaint/Reason for Admission:  Lumbar spondylosis [M47.816]    PTA Medications   Medication Sig    ascorbic acid (VITAMIN C) 500 MG tablet Take 500 mg by mouth once daily.    azelastine (ASTELIN) 137 mcg (0.1 %) nasal spray 1 spray (137 mcg total) by Nasal route 2 (two) times daily.    CALCIUM CITRATE/VITAMIN D3 (CALCIUM CITRATE + ORAL) Take 1 tablet by mouth Daily.    cholecalciferol, vitamin D3, (VITAMIN D3) 50 mcg (2,000 unit) Cap capsule Take 2,000 Units by mouth once daily.    coQ10, ubiquinol, 100 mg Cap Take 1 tablet by mouth once daily.    creatine, bulk, 100 % Powd by Misc.(Non-Drug; Combo Route) route.    krill oil 500 mg Cap Take 2 capsules by mouth Daily.    LACTOBACILLUS ACIDOPHILUS (PROBIOTIC ORAL) Take 1 tablet by mouth Daily.    microfibrillar collagen powder Apply 1 g topically as needed.    multivitamin (THERAGRAN) per tablet Take 1 tablet by mouth once daily.    psyllium (METAMUCIL) powder Take 1 packet by mouth 3 (three) times daily.    acetaminophen (TYLENOL) 500 MG tablet Take 1,000 mg by mouth every 6 (six) hours as needed for Pain.    LIDOcaine 4 % PtMd Apply topically daily as needed.       Review of patient's allergies indicates:   Allergen Reactions    Aspirin Other (See Comments)     Risk of brain bleed    Celebrex [celecoxib]      Fuzzy feeling    Prednisone Other (See Comments)     Blurry vision, not sure if dose was too much for her.    Tramadol      Fuzzy feeling       Past Medical History:   Diagnosis Date    Colon polyps     Diverticulitis     Endometriosis     Hemorrhage     "microhemorrhage of brain"     Infertility, female     Melanoma     MVP (mitral valve prolapse)     Osteoarthritis      Past Surgical History:   Procedure Laterality Date    COLONOSCOPY  06/29/2021    " Dr. Jeanette MOSQUERA  2016    Endometriosis      HERNIA REPAIR      bladder hernia    HYSTERECTOMY      [place], with MESH TAHBSO    INJECTION OF ANESTHETIC AGENT AROUND MEDIAL BRANCH NERVES INNERVATING LUMBAR FACET JOINT Left 4/12/2023    Procedure: Block-nerve-medial branch-lumbar L3/4,L4/5,L5/S1;  Surgeon: Chase Cody MD;  Location: [facility] OR;  Service: Pain Management;  Laterality: Left;    INJECTION OF ANESTHETIC AGENT AROUND MEDIAL BRANCH NERVES INNERVATING LUMBAR FACET JOINT Left 9/22/2023    Procedure: Block-nerve-medial branch-lumbar;  Surgeon: Chase Cody MD;  Location: [facility] OR;  Service: Pain Management;  Laterality: Left;  L3/4, L4/5, L5/S1    Melanoma Removal      OOPHORECTOMY      TOE SURGERY Left     TONSILLECTOMY       Family History   Problem Relation Age of Onset    Thyroid disease Mother     Alzheimer's disease Mother     Hypertension Mother     Arthritis Father     Dementia Father     Breast cancer Sister 68    Stroke Sister     Hypertension Sister     No Known Problems Brother     No Known Problems Maternal Grandmother     No Known Problems Maternal Grandfather     No Known Problems Paternal Grandmother     No Known Problems Paternal Grandfather      Social History     Tobacco Use    Smoking status: Never    Smokeless tobacco: Never   Substance Use Topics    Alcohol use: Yes     Alcohol/week: 7.0 standard drinks of alcohol     Types: 7 Glasses of wine per week     Comment: 7 glasses of a week    Drug use: No        Current Facility-Administered Medications:     lactated ringers infusion, , Intravenous, Continuous, Chase Cody MD    Review of Systems:  General ROS: negative for - fever  Dermatological ROS: negative for rash    OBJECTIVE:     Vital Signs (Most Recent):  Pulse: 70 (11/15/23 1420)  Resp: 18 (11/15/23 1420)  BP: (!) 149/62 (11/15/23 1420)  SpO2: 99 % (11/15/23 1420)  Body mass index is 18.37 kg/m².    Physical Exam:  General appearance - alert, well appearing, and in no  distress  Mental status - AOx3  Eyes - pupils equal and reactive, extraocular eye movements intact  Heart - normal rate, regular rhythm, normal S1, S2, no murmurs, rubs, clicks or gallops  Chest - clear to auscultation, no wheezes, rales or rhonchi, symmetric air entry  Abdomen - soft, nontender, nondistended, no masses or organomegaly  Neurological - alert, oriented, normal speech, no focal findings or movement disorder noted  Extremities - peripheral pulses normal, no pedal edema, no clubbing or cyanosis      ASSESSMENT/PLAN:     There are no hospital problems to display for this patient.     Indication: The patient has clinical and radiologic findings suggestive of facet mediated pain and is s/p 2nd diagnostic left lumbar L3/4, L4/5, and L5/S1 medial branch blocks with at least 80% relief of their axial back pain lasting the duration of the local anesthetic utilized.    We will proceed with left L3/4, L4/5, L5/S1 RFA.    The risks and benefits of this intervention, and alternative therapies were discussed with the patient.  The discussion of risks included infection, bleeding, need for additional procedures or surgery, nerve damage, paralysis, adverse medication reaction(s), stroke, and/or death.  Questions regarding the procedure, risks, expected outcome, and possible side effects were solicited and answered to the patient's satisfaction.  Rosalia Peraza wishes to proceed with the injection.  Verbal and written consent were verified.  ASA 3, MP II      Proceed with intervention as scheduled.    Chase Cody M.D.  Interventional Pain Medicine / Anesthesiology

## 2023-11-15 NOTE — PLAN OF CARE
Patient alert and oriented, resting quietly. Verbalizes understanding of procedure. Vitals stable. Will continue to monitor.

## 2023-11-20 ENCOUNTER — TELEPHONE (OUTPATIENT)
Dept: PAIN MEDICINE | Facility: CLINIC | Age: 73
End: 2023-11-20
Payer: MEDICARE

## 2023-11-20 ENCOUNTER — TELEPHONE (OUTPATIENT)
Dept: PRIMARY CARE CLINIC | Facility: CLINIC | Age: 73
End: 2023-11-20
Payer: MEDICARE

## 2023-11-20 NOTE — TELEPHONE ENCOUNTER
Patient c/o having diarrhea for a few days two weeks ago. She has just returned from out of town and now is constipated. She wanted to know what to do. Patient was encouraged to continue taking psyllium (Metamucil), 1-3 times per day, to drink lots of water, to get active (I.e., walking), and to try an otc stool softener. She verbalized understanding.

## 2023-11-20 NOTE — TELEPHONE ENCOUNTER
Pt returned call, explained to take miralax until  she has a bowel movement. Pt verbalized understanding.   Epxlained once she has a bowel movement, she needs to continue to have a bowel movement to keep her bowels regular. Pt verbalized understanding. She will try to get colace.     Also suggested for pt to ambulate and drink plenty water. Pt verbalized understanding.

## 2023-11-20 NOTE — TELEPHONE ENCOUNTER
If constipated, may use miralax, repeat until bowel movement then stop. Return to metamucil use after bowel movement but advise her to try miralax, warm prune juice, kiwi fruit. And call use back tomorrow to update

## 2023-11-20 NOTE — TELEPHONE ENCOUNTER
----- Message from Racquel Trivedi sent at 11/20/2023 11:51 AM CST -----  Contact: pt [phone]  Type:  Sooner Appointment Request    Caller is requesting a sooner appointment.  Caller declined first available appointment listed below.  Caller will not accept being placed on the waitlist and is requesting a message be sent to doctor.    Name of Caller:  Pt   When is the first available appointment?  Cruz   Symptoms:  f/u from procedure 11/15  Would the patient rather a call back or a response via [facility]? Call   Best Call Back Number:  [phone]    Additional Information:  Pls call back and advise

## 2023-11-27 ENCOUNTER — DOCUMENTATION ONLY (OUTPATIENT)
Dept: REHABILITATION | Facility: HOSPITAL | Age: 73
End: 2023-11-27
Payer: MEDICARE

## 2023-11-27 NOTE — PROGRESS NOTES
"Health  Wellness Visit Note    Name: Rosalia Fernández  Clinic Number: [id number]  Physician: Sayda Barnhart MD  Diagnosis: No diagnosis found.  Past Medical History:   Diagnosis Date    Colon polyps     Diverticulitis     Endometriosis     Hemorrhage     "microhemorrhage of brain"     Infertility, female     Melanoma     MVP (mitral valve prolapse)     Osteoarthritis      Visit Number: [id number]  Precautions: standard      1st PT visit:  5/18/23  Year of care end date:  5/18/24  Mindbody plan: Plan A - 10 months  Patient level:b    Time In: 11:00  Time Out: 12:00  Total Treatment Time: 60    [employer] 2022  Handout on this week's wellness topic provided sleep requirements along with a discussion on what it means, the benefits, and suggestions for practice.  Reviewed last week's topic of  sleep requirements  .    Subjective:   Patient reports that she has a little pain today in her back from doing yard work over the weekend, but that has gone away from doing stretches and exercises. She states that she always has pain normally around a 2/10, but lately she has had less pain.  Pt has to use a cream to help manage her pain. States for exercise she is walking 6 days a week. She walks around 40 minutes and does her stretches after. She is also icing her back everyday around 4 pm. She is having to skip two weeks due to the holidays.     Objective:   Rosalia completed therapeutic stretches (EIL, PARISA) and the following MedX exercise machines: core lumbar, torso rotation l/r, leg extension, leg curl, upright row, chest press, biceps curl, triceps extension, leg press    See exercise log in patient folder for rate of exertion and repetitions completed.       Fitness Machine Education Key:  E=education on equipment initiated and further follow up and education needed  I=independent with  and exercise.  The patient:  Adjusts machines to his/her settings  Uses equipment levers, pins, weights " safely  Maintains safe and correct posture while exercising  Moves through exercise with correct pace and control  Gets on and off equipment safely      Lumbar/Cervical Ext.  Torso Rotation  Leg Press    Leg Extension  Seated Leg Curl  Chest Press    Seated Row  Hip ADD  Hip ABD    Triceps Extension  Bicep Curl  Other:      [x] Indicates exercise has been taught for home  Lumbar/Cervical Ext. [] Torso Rotation [] Leg Press []   Leg Extension [] Seated Leg Curl [] Chest Press []   Seated Row [] Hip ADD [] Hip ABD []   Triceps Extension [] Bicep Curl [] Other:        Assessment:   Patient tolerated MedX Core Lumbar Strength and all other peripheral exercises without an increase in symptoms. Patient warmed up on treadmill for 8 minutes, stretched, and iced low back for 5 minutes after the workout.     Plan:  Continue with established plan of care towards wellness goals.     Health  : Constance Sykes  11/27/2023

## 2023-11-28 ENCOUNTER — CLINICAL SUPPORT (OUTPATIENT)
Dept: REHABILITATION | Facility: HOSPITAL | Age: 73
End: 2023-11-28
Payer: MEDICARE

## 2023-11-28 DIAGNOSIS — R29.898 WEAKNESS OF SHOULDER: ICD-10-CM

## 2023-11-28 DIAGNOSIS — M25.511 CHRONIC RIGHT SHOULDER PAIN: Primary | ICD-10-CM

## 2023-11-28 DIAGNOSIS — G89.29 CHRONIC RIGHT SHOULDER PAIN: Primary | ICD-10-CM

## 2023-11-28 DIAGNOSIS — M79.601 ARM PAIN, ANTERIOR, RIGHT: ICD-10-CM

## 2023-11-28 PROCEDURE — 97162 PT EVAL MOD COMPLEX 30 MIN: CPT | Mod: PN | Performed by: PHYSICAL THERAPIST

## 2023-11-28 PROCEDURE — 97112 NEUROMUSCULAR REEDUCATION: CPT | Mod: PN | Performed by: PHYSICAL THERAPIST

## 2023-11-28 NOTE — PATIENT INSTRUCTIONS
ELASTIC BAND ROWS   Holding elastic band with both hands, draw back the band as you bend your elbows. Keep your elbows near the side of your body. 20x      ELASTIC BAND LAT PULLS    Hold an elastic band with both arms in front of you and with your elbows straight. Your arms should be elevated. Next, pull the band downwards and back towards your side as you bend your elbows. 20x        ELASTIC BAND SHOULDER EXTERNAL ROTATION - ER  While holding an elastic band at your side with your elbow bent, start with your hand near your stomach and then pull the band away. Keep your elbow at your side entire time. 2 sets of 10      Shoulder extension with elastic band    Hook elastic band behind door or heavy object.   Begin with arms bent in front of you holding band, and pull back your arms until elbows straighten and shoulders are back. 20x.               SCAPULAR RETRACTIONS    Draw your shoulder blades back and down. 20x
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
## 2023-11-29 ENCOUNTER — TELEPHONE (OUTPATIENT)
Dept: PRIMARY CARE CLINIC | Facility: CLINIC | Age: 73
End: 2023-11-29
Payer: MEDICARE

## 2023-11-29 NOTE — TELEPHONE ENCOUNTER
----- Message from Kelsi Garcia sent at 11/29/2023 11:04 AM CST -----  Regarding: pt advice  [phone] - call back ;  unable to have bowel movement wanted to know if she needs keep taking the Miralax until she gets the movement going or she can try something else.      Kelsi

## 2023-11-29 NOTE — TELEPHONE ENCOUNTER
Patient was encouraged to continue taking Miralax and docusate sodium once a day, drink warm prune juice, hydrate and keep active. If she still is not having a bowel movement, she is encouraged to try a glycerin suppository. She verbalized understanding.

## 2023-12-04 ENCOUNTER — DOCUMENTATION ONLY (OUTPATIENT)
Dept: REHABILITATION | Facility: HOSPITAL | Age: 73
End: 2023-12-04
Payer: MEDICARE

## 2023-12-04 NOTE — PROGRESS NOTES
"Health  Wellness Visit Note    Name: Rosalia Fernández  Clinic Number: [id number]  Physician: Sayda Barnhart MD  Diagnosis: No diagnosis found.  Past Medical History:   Diagnosis Date    Colon polyps     Diverticulitis     Endometriosis     Hemorrhage     "microhemorrhage of brain"     Infertility, female     Melanoma     MVP (mitral valve prolapse)     Osteoarthritis      Visit Number: 35  Precautions: standard      1st PT visit:  5/18/23  Year of care end date:  5/18/24  Mindbody plan: Plan A - 10 months  Patient level:b    Time In: 11:00  Time Out: 12:00  Total Treatment Time: 60    Wellness [employer] 2022  Handout on this week's wellness topic provided sleep requirements along with a discussion on what it means, the benefits, and suggestions for practice.  Reviewed last week's topic of  sleep requirements  .    Subjective:   Patient reports that she has a little pain today in her back from doing yard work over the weekend, but that has gone away from doing stretches and exercises. She states that she always has pain normally around a 2/10, but lately she has had less pain.  Pt has to use a cream to help manage her pain. States for exercise she is walking 6 days a week. She walks around 40 minutes and does her stretches after. She is also icing her back everyday around 4 pm. She is having to skip two weeks due to the holidays.     Objective:   Rosalia completed therapeutic stretches (EIL, PARISA) and the following MedX exercise machines: core lumbar, torso rotation l/r, leg extension, leg curl, upright row, chest press, biceps curl, triceps extension, leg press    See exercise log in patient folder for rate of exertion and repetitions completed.       Fitness Machine Education Key:  E=education on equipment initiated and further follow up and education needed  I=independent with  and exercise.  The patient:  Adjusts machines to his/her settings  Uses equipment levers, pins, weights " safely  Maintains safe and correct posture while exercising  Moves through exercise with correct pace and control  Gets on and off equipment safely      Lumbar/Cervical Ext.  Torso Rotation  Leg Press    Leg Extension  Seated Leg Curl  Chest Press    Seated Row  Hip ADD  Hip ABD    Triceps Extension  Bicep Curl  Other:      [x] Indicates exercise has been taught for home  Lumbar/Cervical Ext. [] Torso Rotation [] Leg Press []   Leg Extension [] Seated Leg Curl [] Chest Press []   Seated Row [] Hip ADD [] Hip ABD []   Triceps Extension [] Bicep Curl [] Other:        Assessment:   Patient tolerated MedX Core Lumbar Strength and all other peripheral exercises without an increase in symptoms. Patient warmed up on treadmill for 8 minutes, stretched, and iced low back for 5 minutes after the workout.     Plan:  Continue with established plan of care towards wellness goals.     Health  : Constance Sykes  12/4/2023

## 2023-12-13 ENCOUNTER — TELEPHONE (OUTPATIENT)
Dept: PRIMARY CARE CLINIC | Facility: CLINIC | Age: 73
End: 2023-12-13
Payer: MEDICARE

## 2023-12-13 NOTE — TELEPHONE ENCOUNTER
Patient c/o cough and nasal congestion and wants to know what Dr. Barnhart recommends. Patient was encourage to try Claritin or Zyrtec, Flonase and saline nasal spray for nasal congestion; Delsym or Mucinex for her chest congestion and cough. Also, a steam tent and hot tea with honey and lemon. Patient verbalized understanding.

## 2023-12-13 NOTE — TELEPHONE ENCOUNTER
----- Message from Kelsi Garcia sent at 12/13/2023 10:21 AM CST -----  Regarding: pt advcie - nasal drip  [phone] - call back ; having some severe runny nose, post nasal drip makes her cough and sneeze ; no fever and negative for Covid (home kit;Monday was the last test she did) it all started last Sunday.    She can not take aspirin because of bleeding;  have a little red pill thenylephine hci 10 mg takes it old the time for his de congestion to see if she can take that    Need advice what to do or what to take      Kelsi

## 2023-12-18 ENCOUNTER — DOCUMENTATION ONLY (OUTPATIENT)
Dept: REHABILITATION | Facility: HOSPITAL | Age: 73
End: 2023-12-18
Payer: MEDICARE

## 2023-12-18 NOTE — PROGRESS NOTES
"Health  Wellness Visit Note    Name: Rosalia Fernández  Clinic Number: [id number]  Physician: Sayda Barnhart MD  Diagnosis: No diagnosis found.  Past Medical History:   Diagnosis Date    Colon polyps     Diverticulitis     Endometriosis     Hemorrhage     "microhemorrhage of brain"     Infertility, female     Melanoma     MVP (mitral valve prolapse)     Osteoarthritis      Visit Number: 36  Precautions: standard      1st PT visit:  5/18/23  Year of care end date:  5/18/24  Mindbody plan: Plan A - 10 months  Patient level:b    Time In: 11:00  Time Out: 12:00  Total Treatment Time: 60    Wellness [employer] 2022  Handout on this week's wellness topic provided time management along with a discussion on what it means, the benefits, and suggestions for practice.  Reviewed last week's topic of  sleep requirements  .    Subjective:   Patient reports that she has a little pain today in her back from doing yard work over the weekend, but that has gone away from doing stretches and exercises. She states that she always has pain normally around a 2/10, but lately she has had less pain.  Pt has to use a cream to help manage her pain. States for exercise she is walking 6 days a week. She walks around 40 minutes and does her stretches after. She is also icing her back everyday around 4 pm. She is having to skip two weeks due to the holidays.     Objective:   Rosalia completed therapeutic stretches (EIL, PARISA) and the following MedX exercise machines: core lumbar, torso rotation l/r, leg extension, leg curl, upright row, chest press, biceps curl, triceps extension, leg press    See exercise log in patient folder for rate of exertion and repetitions completed.       Fitness Machine Education Key:  E=education on equipment initiated and further follow up and education needed  I=independent with  and exercise.  The patient:  Adjusts machines to his/her settings  Uses equipment levers, pins, weights safely  Maintains " [FreeTextEntry1] : 39 year old F is 12 months s/p Laparoscopic Sleeve Gastrectomy and hiatal hernia repair. Feels great. Weight loss since last visit. Saw nutrition 2/14/2023.\par Doing well.\par \par Reviewed guidelines about nutrition and snacking - protein focus diet with 3 meals/day\par Continue daily bariatric MVI and zero calorie liquid intake 64 oz/day\par Continue Colace daily for constipation - try Benefiber or Citrucel \par Continue daily exercise regimen incorporating cardio and strength training\par Track steps - goal approximately 8-10k steps per day\par \par Lab performed 12/3/2022, results reviewed with pt\par All vitamin levels normal \par Labs ordered to be done before next visit \par \par Return to office in 6 months\par Follow up with Nutritionist in 3 months\par All questions answered\par \par \par Additional time spent before and after visit reviewing chart  safe and correct posture while exercising  Moves through exercise with correct pace and control  Gets on and off equipment safely      Lumbar/Cervical Ext.  Torso Rotation  Leg Press    Leg Extension  Seated Leg Curl  Chest Press    Seated Row  Hip ADD  Hip ABD    Triceps Extension  Bicep Curl  Other:      [x] Indicates exercise has been taught for home  Lumbar/Cervical Ext. [] Torso Rotation [] Leg Press []   Leg Extension [] Seated Leg Curl [] Chest Press []   Seated Row [] Hip ADD [] Hip ABD []   Triceps Extension [] Bicep Curl [] Other:        Assessment:   Patient tolerated MedX Core Lumbar Strength and all other peripheral exercises without an increase in symptoms. Patient warmed up on treadmill for 8 minutes, stretched, and iced low back for 5 minutes after the workout.     Plan:  Continue with established plan of care towards wellness goals.     Health  : Constance Sykes  12/18/2023

## 2023-12-19 ENCOUNTER — DOCUMENTATION ONLY (OUTPATIENT)
Dept: REHABILITATION | Facility: HOSPITAL | Age: 73
End: 2023-12-19
Payer: MEDICARE

## 2023-12-19 NOTE — PROGRESS NOTES
Called pt to check on progress with exercises given for her shoulder. She reports she has been consistent and pain is improving. She will continue with exercise. Will recheck with pt in 3 weeks to determine need for further PT

## 2023-12-29 ENCOUNTER — DOCUMENTATION ONLY (OUTPATIENT)
Dept: REHABILITATION | Facility: HOSPITAL | Age: 73
End: 2023-12-29
Payer: MEDICARE

## 2023-12-29 NOTE — PROGRESS NOTES
"Health  Wellness Visit Note    Name: Rosalia Fernández  Clinic Number: [id number]  Physician: Sayda Barnhart MD  Diagnosis: No diagnosis found.  Past Medical History:   Diagnosis Date    Colon polyps     Diverticulitis     Endometriosis     Hemorrhage     "microhemorrhage of brain"     Infertility, female     Melanoma     MVP (mitral valve prolapse)     Osteoarthritis      Visit Number: 37  Precautions: standard      1st PT visit:  5/18/23  Year of care end date:  5/18/24  Mindbody plan: Plan A - 10 months  Patient level:b    Time In: 11:00  Time Out: 12:00  Total Treatment Time: 60    Wellness [employer] 2022  Handout on this week's wellness topic provided time management along with a discussion on what it means, the benefits, and suggestions for practice.  Reviewed last week's topic of  sleep requirements  .    Subjective:   Patient reports that she has a little pain today in her back from doing yard work over the weekend, but that has gone away from doing stretches and exercises. She states that she always has pain normally around a 2/10, but lately she has had less pain.  Pt has to use a cream to help manage her pain. States for exercise she is walking 6 days a week. She walks around 40 minutes and does her stretches after. She is also icing her back everyday around 4 pm. She is having to skip two weeks due to the holidays.     Objective:   Rosalia completed therapeutic stretches (EIL, PARISA) and the following MedX exercise machines: core lumbar, torso rotation l/r, leg extension, leg curl, upright row, chest press, biceps curl, triceps extension, leg press    See exercise log in patient folder for rate of exertion and repetitions completed.       Fitness Machine Education Key:  E=education on equipment initiated and further follow up and education needed  I=independent with  and exercise.  The patient:  Adjusts machines to his/her settings  Uses equipment levers, pins, weights safely  Maintains " safe and correct posture while exercising  Moves through exercise with correct pace and control  Gets on and off equipment safely      Lumbar/Cervical Ext.  Torso Rotation  Leg Press    Leg Extension  Seated Leg Curl  Chest Press    Seated Row  Hip ADD  Hip ABD    Triceps Extension  Bicep Curl  Other:      [x] Indicates exercise has been taught for home  Lumbar/Cervical Ext. [] Torso Rotation [] Leg Press []   Leg Extension [] Seated Leg Curl [] Chest Press []   Seated Row [] Hip ADD [] Hip ABD []   Triceps Extension [] Bicep Curl [] Other:        Assessment:   Patient tolerated MedX Core Lumbar Strength and all other peripheral exercises without an increase in symptoms. Patient warmed up on treadmill for 8 minutes, stretched, and iced low back for 5 minutes after the workout.     Plan:  Continue with established plan of care towards wellness goals.     Health  : Constance Sykes  12/29/2023

## 2024-01-05 ENCOUNTER — DOCUMENTATION ONLY (OUTPATIENT)
Dept: REHABILITATION | Facility: HOSPITAL | Age: 74
End: 2024-01-05
Payer: MEDICARE

## 2024-01-05 ENCOUNTER — OFFICE VISIT (OUTPATIENT)
Dept: PAIN MEDICINE | Facility: CLINIC | Age: 74
End: 2024-01-05
Payer: MEDICARE

## 2024-01-05 VITALS
BODY MASS INDEX: 18.5 KG/M2 | HEART RATE: 83 BPM | WEIGHT: 108.38 LBS | DIASTOLIC BLOOD PRESSURE: 70 MMHG | SYSTOLIC BLOOD PRESSURE: 153 MMHG | HEIGHT: 64 IN

## 2024-01-05 DIAGNOSIS — M47.816 LUMBAR SPONDYLOSIS: Primary | ICD-10-CM

## 2024-01-05 DIAGNOSIS — M54.9 DORSALGIA, UNSPECIFIED: ICD-10-CM

## 2024-01-05 DIAGNOSIS — M41.9 SCOLIOSIS, UNSPECIFIED SCOLIOSIS TYPE, UNSPECIFIED SPINAL REGION: ICD-10-CM

## 2024-01-05 PROCEDURE — 1159F MED LIST DOCD IN RCRD: CPT | Mod: CPTII,S$GLB,,

## 2024-01-05 PROCEDURE — 3077F SYST BP >= 140 MM HG: CPT | Mod: CPTII,S$GLB,,

## 2024-01-05 PROCEDURE — 1101F PT FALLS ASSESS-DOCD LE1/YR: CPT | Mod: CPTII,S$GLB,,

## 2024-01-05 PROCEDURE — 99999 PR PBB SHADOW E&M-EST. PATIENT-LVL III: CPT | Mod: PBBFAC,,,

## 2024-01-05 PROCEDURE — 3078F DIAST BP <80 MM HG: CPT | Mod: CPTII,S$GLB,,

## 2024-01-05 PROCEDURE — 99213 OFFICE O/P EST LOW 20 MIN: CPT | Mod: S$GLB,,,

## 2024-01-05 PROCEDURE — 3288F FALL RISK ASSESSMENT DOCD: CPT | Mod: CPTII,S$GLB,,

## 2024-01-05 PROCEDURE — 1126F AMNT PAIN NOTED NONE PRSNT: CPT | Mod: CPTII,S$GLB,,

## 2024-01-05 NOTE — PROGRESS NOTES
"Health  Wellness Visit Note    Name: Rosalia Fernández  Clinic Number: [id number]  Physician: No ref. provider found  Diagnosis: No diagnosis found.  Past Medical History:   Diagnosis Date    Colon polyps     Diverticulitis     Endometriosis     Hemorrhage     "microhemorrhage of brain"     Infertility, female     Melanoma     MVP (mitral valve prolapse)     Osteoarthritis      Visit Number: 37  Precautions: standard      1st PT visit:  5/18/23  Year of care end date:  5/18/24  Mindbody plan: Plan A - 10 months  Patient level:b    Time In: 11:00  Time Out: 12:00  Total Treatment Time: 60    Wellness Vision 2022  Handout on this week's wellness topic of gratitude provided time management along with a discussion on what it means, the benefits, and suggestions for practice.  Reviewed last week's topic of    .    Subjective:   Patient reports that she has a little pain today in her back, but that has gone away from doing stretches and exercises. Pt will see pain management today after our apt. She states that she always has pain normally around a 2/10, but lately she has had less pain.  Pt has to use a cream to help manage her pain. States for exercise she is walking 6 days a week. She walks around 40 minutes and does her stretches after. She is also icing her back everyday around 4 pm.     Objective:   Rosalia completed therapeutic stretches (EIL, PARISA) and the following MedX exercise machines: core lumbar, torso rotation l/r, leg extension, leg curl, upright row, chest press, biceps curl, triceps extension, leg press    See exercise log in patient folder for rate of exertion and repetitions completed.       Fitness Machine Education Key:  E=education on equipment initiated and further follow up and education needed  I=independent with  and exercise.  The patient:  Adjusts machines to his/her settings  Uses equipment levers, pins, weights safely  Maintains safe and correct posture while exercising  Moves " through exercise with correct pace and control  Gets on and off equipment safely      Lumbar/Cervical Ext.  Torso Rotation  Leg Press    Leg Extension  Seated Leg Curl  Chest Press    Seated Row  Hip ADD  Hip ABD    Triceps Extension  Bicep Curl  Other:      [x] Indicates exercise has been taught for home  Lumbar/Cervical Ext. [x] Torso Rotation [x] Leg Press [x]   Leg Extension [x] Seated Leg Curl [x] Chest Press [x]   Seated Row [x] Hip ADD [x] Hip ABD [x]   Triceps Extension [x] Bicep Curl [x] Other:        Assessment:   Patient tolerated MedX Core Lumbar Strength and all other peripheral exercises without an increase in symptoms. Patient warmed up on treadmill for 8 minutes, stretched, and iced low back for 5 minutes after the workout.     Plan:  Continue with established plan of care towards wellness goals.     Health  : Constance Sykes  1/5/2024     Shahana

## 2024-01-05 NOTE — PROGRESS NOTES
[facility] Follow Up Evaluation      Referred by: No ref. provider found    PCP:     CC:   Chief Complaint   Patient presents with    Low-back Pain     Left sided          1/5/2024     1:02 PM 9/6/2023     9:32 AM 4/5/2023     2:53 PM   Last 3 PDI Scores   Pain Disability Index (PDI) 2 16 28     Interval HPI 1/5/2024: Rosalia Fernández returns to the office for follow up.  She is s/p left L3/4, L4/5 and L5/S1 radiofrequency ablation on 11/15/2023 with 90% relief.  Overall, she denies any significant remaining pain.  She reports some continued chronic numbness in left foot but denies any other numbness, weakness or any new changes to her bowel bladder function.      HPI:   Rosalia Fernández is a 73 y.o. female who presents with back pain.  She is had chronic lower back pain for many years and has been gradually worsening.  Today she reports her back pain is primarily radiating down into the left buttock and occasionally down the outside of the left leg stopping at the knee.  She denies any numbness or weakness.  Her pain is worse with activities, walking, bending, coughing.  She is unsure of any specific thing that improves her pain as her pain is constant.      Pain Intervention History:  - status post 1st diagnostic left L3-4, L4-5, L5-S1 medial branch blocks on 4/12/23 with over 80% relief lasting for 8 hours.    - s/p left L3/4, L4/5 and L5/S1 radiofrequency ablation on 11/15/2023 with 90% relief.    Past Spine Surgical History:      Past and current medications:  Antineuropathics:  NSAIDs: mobic, celebrex  Physical therapy: yes, completed   Antidepressants:  Muscle relaxers:  Opioids:  Antiplatelets/Anticoagulants:    History:    Current Outpatient Medications:     acetaminophen (TYLENOL) 500 MG tablet, Take 1,000 mg by mouth every 6 (six) hours as needed for Pain., Disp: , Rfl:     ascorbic acid (VITAMIN C) 500 MG tablet, Take 500 mg by mouth once daily., Disp: , Rfl:     azelastine (ASTELIN) 137  "mcg (0.1 %) nasal spray, 1 spray (137 mcg total) by Nasal route 2 (two) times daily., Disp: 30 mL, Rfl: 6    CALCIUM CITRATE/VITAMIN D3 (CALCIUM CITRATE + ORAL), Take 1 tablet by mouth Daily., Disp: , Rfl:     cholecalciferol, vitamin D3, (VITAMIN D3) 50 mcg (2,000 unit) Cap capsule, Take 2,000 Units by mouth once daily., Disp: , Rfl:     coQ10, ubiquinol, 100 mg Cap, Take 1 tablet by mouth once daily., Disp: , Rfl:     creatine, bulk, 100 % Powd, by Misc.(Non-Drug; Combo Route) route., Disp: , Rfl:     krill oil 500 mg Cap, Take 2 capsules by mouth Daily., Disp: , Rfl:     LACTOBACILLUS ACIDOPHILUS (PROBIOTIC ORAL), Take 1 tablet by mouth Daily., Disp: , Rfl:     LIDOcaine 4 % PtMd, Apply topically daily as needed., Disp: , Rfl:     microfibrillar collagen powder, Apply 1 g topically as needed., Disp: , Rfl:     multivitamin (THERAGRAN) per tablet, Take 1 tablet by mouth once daily., Disp: , Rfl:     psyllium (METAMUCIL) powder, Take 1 packet by mouth 3 (three) times daily., Disp: , Rfl:     Past Medical History:   Diagnosis Date    Colon polyps     Diverticulitis     Endometriosis     Hemorrhage     "microhemorrhage of brain"     Infertility, female     Melanoma     MVP (mitral valve prolapse)     Osteoarthritis        Past Surgical History:   Procedure Laterality Date    ABLATION OF MEDIAL BRANCH NERVE OF LUMBAR SPINE FACET JOINT Left 11/15/2023    Procedure: ABLATION, NERVE, FACET JOINT, LUMBAR, MEDIAL BRANCH L3/4, L4/5, L5/S1;  Surgeon: Chase Cody MD;  Location: [facility] OR;  Service: Pain Management;  Laterality: Left;    COLONOSCOPY  06/29/2021    Dr. Jeanette MOSQUERA  2016    Endometriosis      HERNIA REPAIR      bladder hernia    HYSTERECTOMY      Patel, with MESH TAHBSO    INJECTION OF ANESTHETIC AGENT AROUND MEDIAL BRANCH NERVES INNERVATING LUMBAR FACET JOINT Left 4/12/2023    Procedure: Block-nerve-medial branch-lumbar L3/4,L4/5,L5/S1;  Surgeon: Chase Cody MD;  Location: [facility] OR;  Service: Pain " Management;  Laterality: Left;    INJECTION OF ANESTHETIC AGENT AROUND MEDIAL BRANCH NERVES INNERVATING LUMBAR FACET JOINT Left 9/22/2023    Procedure: Block-nerve-medial branch-lumbar;  Surgeon: Chase Cody MD;  Location: [facility] OR;  Service: Pain Management;  Laterality: Left;  L3/4, L4/5, L5/S1    Melanoma Removal      OOPHORECTOMY      TOE SURGERY Left     TONSILLECTOMY         Family History   Problem Relation Age of Onset    Thyroid disease Mother     Alzheimer's disease Mother     Hypertension Mother     Arthritis Father     Dementia Father     Breast cancer Sister 68    Stroke Sister     Hypertension Sister     No Known Problems Brother     No Known Problems Maternal Grandmother     No Known Problems Maternal Grandfather     No Known Problems Paternal Grandmother     No Known Problems Paternal Grandfather        Social History     Socioeconomic History    Marital status:      Spouse name: Mason     Number of children: 0   Occupational History    Occupation: retired   Tobacco Use    Smoking status: Never    Smokeless tobacco: Never   Substance and Sexual Activity    Alcohol use: Yes     Alcohol/week: 7.0 standard drinks of alcohol     Types: 7 Glasses of wine per week     Comment: 7 glasses of a week    Drug use: No    Sexual activity: Yes     Partners: Male     Social Determinants of Health     Financial Resource Strain: Low Risk  (10/31/2023)    Overall Financial Resource Strain (CARDIA)     Difficulty of Paying Living Expenses: Not hard at all   Food Insecurity: No Food Insecurity (10/31/2023)    Hunger Vital Sign     Worried About Running Out of Food in the Last Year: Never true     Ran Out of Food in the Last Year: Never true   Transportation Needs: No Transportation Needs (10/31/2023)    PRAPARE - Transportation     Lack of Transportation (Medical): No     Lack of Transportation (Non-Medical): No   Physical Activity: Sufficiently Active (10/31/2023)    Exercise Vital Sign     Days of Exercise  "per Week: 5 days     Minutes of Exercise per Session: 40 min   Stress: Stress Concern Present (10/31/2023)    [employer] - Occupational Stress Questionnaire     Feeling of Stress : To some extent   Social Connections: Unknown (10/31/2023)    Social Connection and Isolation Panel [NHANES]     Frequency of Communication with Friends and Family: More than three times a week     Frequency of Social Gatherings with Friends and Family: More than three times a week     Active Member of Clubs or Organizations: Yes     Attends Club or Organization Meetings: More than 4 times per year     Marital Status:    Housing Stability: Low Risk  (10/31/2023)    Housing Stability Vital Sign     Unable to Pay for Housing in the Last Year: No     Number of Places Lived in the Last Year: 1     Unstable Housing in the Last Year: No       Review of patient's allergies indicates:   Allergen Reactions    Aspirin Other (See Comments)     Risk of brain bleed    Celebrex [celecoxib]      Fuzzy feeling    Prednisone Other (See Comments)     Blurry vision, not sure if dose was too much for her.    Tramadol      Fuzzy feeling       Review of Systems:  12 point review of systems is negative.    Physical Exam:  Vitals:    01/05/24 1259   BP: (!) 153/70   Pulse: 83   Weight: 49.2 kg (108 lb 5.7 oz)   Height: 5' 4" (1.626 m)   PainSc: 0-No pain   PainLoc: Back     Body mass index is 18.6 kg/m².    Gen: NAD  Psych: mood appropriate for given condition  HEENT: eyes anicteric   CV: RRR, 2+ radial pulse  HEENT: anicteric   Respiratory: non-labored, no signs of respiratory distress  Abd: non-distended  Skin: warm, dry and intact.  Gait: No antalgic gait.     Mild pain with lumbar axial facet loading, left greater than right      Sensory:  Intact and symmetrical to light touch in L1-S1 dermatomes bilaterally.    Motor:     Right Left   L2/3 Iliacus Hip flexion  5  5   L3/4 Qudratus Femoris Knee Extension  5  5   L4/5 Tib " Anterior Ankle Dorsiflexion   5  5   L5/S1 Extensor Hallicus Longus Great toe extension  5  5   S1/S2 Gastroc/Soleus Plantar Flexion  5  5      Right Left                  Patellar DTR 2+ 0   Achilles DTR 0 0                      Imaging:  Xray lumbar spine 3/13/23  FINDINGS:  There is scoliosis present with convexity to the right.  This measures approximately 24° with the apex at L1-2.  No fractures are seen.  There are diffuse degenerative changes.    Xray SI joints 3/13/23  FINDINGS:  No acute displaced fracture or dislocation identified.  There are mild degenerative changes noted of the bilateral sacroiliac joints.  Sacral arcuate lines appear intact.  No radiopaque foreign body    MRI lumbar spine 03/30/2023     FINDINGS:  Morphology: Vertebral body heights are preserved.  Prominent Modic type 1 degenerative endplate changes at L2-L3 in the setting of broad rotatory dextroscoliotic curvature discussed in further detail below.  Associated small Schmorl's nodes.  Marrow signal is otherwise within normal limits..     Alignment: Broad rotatory dextroscoliotic curvature apex at the L2-L3 level.  Grade 1 anterolisthesis of L4 on L5.     Cord: Normal in contour, caliber, and signal intensity.  Conus positioning is within normal limits.     Disc levels:     T12-L1: Within normal limits.  The spinal canal and foramina are patent.  L1-L2: Mild degenerative disc height loss and bilateral facet arthrosis.  No disc herniation.  The spinal canal and foramina remain patent.  L2-L3: Severe degenerative disc height loss asymmetric to the left.  Shallow disc bulging and moderate bilateral facet arthrosis with encroachment of both subarticular recesses, left greater than right without substantial narrowing of the spinal canal.  There is moderate left foraminal narrowing.  The right foramen is patent.  L3-L4: Mild degenerative disc height loss and desiccation with shallow disc bulging and moderate to severe facet arthrosis with  ligamentum flavum thickening and small facet effusions.  Mild encroachment of both subarticular recesses.  The spinal canal remains patent.  Mild left foraminal narrowing.  The right foramen is patent.  Small facet effusions, left greater than right.  L4-L5: Severe degenerative disc height loss asymmetric to the right.  Shallow disc bulging and severe facet arthrosis with ligamentum flavum thickening and grade 1 spondylolisthesis producing mild encroachment of both subarticular recesses, right greater than left.  The spinal canal remains patent.  Mild-to-moderate right foraminal narrowing.  The left foramen is patent.  L5-S1: Mild degenerative disc height loss and moderate bilateral facet arthrosis with small facet effusions, right greater than left.  The spinal canal and subarticular recesses as well as both foramina remain patent.     Other: Visualized paraspinal soft tissues are within normal limits.     Impression:     1. Broad rotatory dextroscoliotic curvature of the lumbar spine apex at the L2-L3 level with reactive Modic type 1 endplate changes.  2. Multifactorial degenerative changes including advanced multilevel facet arthrosis but no substantial spinal canal narrowing.  Multilevel subarticular recess narrowing as discussed above.  3. Mild to moderate right L4-L5 and moderate left L2-L3 foraminal narrowing.    Labs:  Lab Results   Component Value Date    HGBA1C 5.7 (H) 09/26/2023       Lab Results   Component Value Date    WBC 4.60 09/26/2023    HGB 14.7 09/26/2023    HCT 44.6 09/26/2023    MCV 93 09/26/2023     09/26/2023           Assessment:   Problem List Items Addressed This Visit    None  Visit Diagnoses       Lumbar spondylosis    -  Primary    Dorsalgia, unspecified        Scoliosis, unspecified scoliosis type, unspecified spinal region                  73 y.o. year old female with PMH history of transient neurologic symptoms, mitral valve prolapse who presents with back pain.  She is had  chronic lower back pain for many years and has been gradually worsening.  Today she reports her back pain is primarily radiating down into the left buttock and occasionally down the outside of the left leg stopping at the knee.  She denies any numbness or weakness.  Her pain is worse with activities, walking, bending, coughing.  She is unsure of any specific thing that improves her pain as her pain is constant.    1/5/2024: Rosalia Fernández returns to the office for follow up.  She is s/p left L3/4, L4/5 and L5/S1 radiofrequency ablation on 11/15/2023 with 90% relief.  Overall, she denies any significant remaining pain.  She reports some continued chronic numbness in left foot but denies any other numbness, weakness or any new changes to her bowel bladder function.    - on exam she has full strength of her lower extremities intact sensation light touch bilateral L2-S1.   - She is s/p left L3/4, L4/5 and L5/S1 radiofrequency ablation on 11/15/2023 with 90% relief.  - I independently reviewed her lumbar MRI and is consistent with broad rotatory dextroscoliotic curvature of the lumbar spine apex at the L2-L3 level with reactive Modic type 1 endplate changes. Multifactorial degenerative changes including advanced multilevel facet arthrosis but no substantial spinal canal narrowing.  - she responded well to the lumbar RFA.  No significant remaining pain.  Discussed continuing her at-home PT directed exercises.  - follow up as needed.  Can repeat in the future      : Reviewed       This note was completed with dictation software and grammatical errors may exist.

## 2024-01-10 ENCOUNTER — DOCUMENTATION ONLY (OUTPATIENT)
Dept: REHABILITATION | Facility: HOSPITAL | Age: 74
End: 2024-01-10
Payer: MEDICARE

## 2024-01-10 NOTE — PROGRESS NOTES
"Health  Wellness Visit Note    Name: Rosalia Fernández  Clinic Number: [id number]  Physician: No ref. provider found  Diagnosis: No diagnosis found.  Past Medical History:   Diagnosis Date    Colon polyps     Diverticulitis     Endometriosis     Hemorrhage     "microhemorrhage of brain"     Infertility, female     Melanoma     MVP (mitral valve prolapse)     Osteoarthritis      Visit Number: 38  Precautions: standard      1st PT visit:  5/18/23  Year of care end date:  5/18/24  Mindbody plan: Plan A - 10 months  Patient level:b    Time In: 11:00  Time Out: 12:00  Total Treatment Time: 60    [employer]22  Handout on this week's wellness topic of get outdoors provided time management along with a discussion on what it means, the benefits, and suggestions for practice.  Reviewed last week's topic of   gratitude  .    Subjective:   Patient reports that she has a little pain today in her back, but that has gone away from doing stretches and exercises. Pt will see pain management today after our apt. She states that she always has pain normally around a 2/10, but lately she has had less pain.  Pt has to use a cream to help manage her pain. States for exercise she is walking 6 days a week. She walks around 40 minutes and does her stretches after. She is also icing her back everyday around 4 pm.     Objective:   Rosalia completed therapeutic stretches (EIL, PARISA) and the following MedX exercise machines: core lumbar, torso rotation l/r, leg extension, leg curl, upright row, chest press, biceps curl, triceps extension, leg press    See exercise log in patient folder for rate of exertion and repetitions completed.       Fitness Machine Education Key:  E=education on equipment initiated and further follow up and education needed  I=independent with  and exercise.  The patient:  Adjusts machines to his/her settings  Uses equipment levers, pins, weights safely  Maintains safe and correct posture while " exercising  Moves through exercise with correct pace and control  Gets on and off equipment safely      Lumbar/Cervical Ext.  Torso Rotation  Leg Press    Leg Extension  Seated Leg Curl  Chest Press    Seated Row  Hip ADD  Hip ABD    Triceps Extension  Bicep Curl  Other:      [x] Indicates exercise has been taught for home  Lumbar/Cervical Ext. [x] Torso Rotation [x] Leg Press [x]   Leg Extension [x] Seated Leg Curl [x] Chest Press [x]   Seated Row [x] Hip ADD [x] Hip ABD [x]   Triceps Extension [x] Bicep Curl [x] Other:        Assessment:   Patient tolerated MedX Core Lumbar Strength and all other peripheral exercises without an increase in symptoms. Patient warmed up on treadmill for 8 minutes, stretched, and iced low back for 5 minutes after the workout.     Plan:  Continue with established plan of care towards wellness goals.     Health  : Constance Sykes  1/10/2024

## 2024-01-19 ENCOUNTER — DOCUMENTATION ONLY (OUTPATIENT)
Dept: REHABILITATION | Facility: HOSPITAL | Age: 74
End: 2024-01-19
Payer: MEDICARE

## 2024-01-24 ENCOUNTER — TELEPHONE (OUTPATIENT)
Dept: PRIMARY CARE CLINIC | Facility: CLINIC | Age: 74
End: 2024-01-24
Payer: MEDICARE

## 2024-01-24 DIAGNOSIS — R19.00 ABDOMINAL WALL BULGE: Primary | ICD-10-CM

## 2024-01-24 NOTE — TELEPHONE ENCOUNTER
Called patient and told her MD ordered US, so appt scheduled 1/30/24 at 945a. Patient was advised to arrive to clinic a few mins early and fast 8 hours beforehand.

## 2024-01-26 ENCOUNTER — DOCUMENTATION ONLY (OUTPATIENT)
Dept: REHABILITATION | Facility: HOSPITAL | Age: 74
End: 2024-01-26
Payer: MEDICARE

## 2024-01-26 NOTE — PROGRESS NOTES
"Health  Wellness Visit Note    Name: Rosalia Fernández  Clinic Number: [id number]  Physician: No ref. provider found  Diagnosis: No diagnosis found.  Past Medical History:   Diagnosis Date    Colon polyps     Diverticulitis     Endometriosis     Hemorrhage     "microhemorrhage of brain"     Infertility, female     Melanoma     MVP (mitral valve prolapse)     Osteoarthritis      Visit Number: 42  Precautions: standard      1st PT visit:  5/18/23  Year of care end date:  5/18/24  Mindbody plan: Plan A - 10 months  Patient level:b    Time In: 11:00  Time Out: 12:00  Total Treatment Time: 60    [employer] 2022  Handout on this week's wellness topic of meditation  provided time management along with a discussion on what it means, the benefits, and suggestions for practice.  Reviewed last week's topic of get outdoors  Subjective:   Patient reports that she has a little pain today in her back, but that has gone away from doing stretches and exercises. Pt will see pain management today after our apt. She states that she always has pain normally around a 2/10, but lately she has had less pain.  Pt has to use a cream to help manage her pain. States for exercise she is walking 6 days a week. She walks around 40 minutes and does her stretches after. She is also icing her back everyday around 4 pm.     Objective:   Rosalia completed therapeutic stretches (EIL, PARISA) and the following MedX exercise machines: core lumbar, torso rotation l/r, leg extension, leg curl, upright row, chest press, biceps curl, triceps extension, leg press    See exercise log in patient folder for rate of exertion and repetitions completed.       Fitness Machine Education Key:  E=education on equipment initiated and further follow up and education needed  I=independent with  and exercise.  The patient:  Adjusts machines to his/her settings  Uses equipment levers, pins, weights safely  Maintains safe and correct posture while " exercising  Moves through exercise with correct pace and control  Gets on and off equipment safely      Lumbar/Cervical Ext.  Torso Rotation  Leg Press    Leg Extension  Seated Leg Curl  Chest Press    Seated Row  Hip ADD  Hip ABD    Triceps Extension  Bicep Curl  Other:      [x] Indicates exercise has been taught for home  Lumbar/Cervical Ext. [x] Torso Rotation [x] Leg Press [x]   Leg Extension [x] Seated Leg Curl [x] Chest Press [x]   Seated Row [x] Hip ADD [x] Hip ABD [x]   Triceps Extension [x] Bicep Curl [x] Other:        Assessment:   Patient tolerated MedX Core Lumbar Strength and all other peripheral exercises without an increase in symptoms. Patient warmed up on treadmill for 8 minutes, stretched, and iced low back for 5 minutes after the workout.     Plan:  Continue with established plan of care towards wellness goals.     Health  : Constance Sykes  1/26/2024

## 2024-01-31 ENCOUNTER — HOSPITAL ENCOUNTER (OUTPATIENT)
Dept: RADIOLOGY | Facility: HOSPITAL | Age: 74
Discharge: HOME | End: 2024-01-31
Attending: INTERNAL MEDICINE
Payer: MEDICARE

## 2024-01-31 ENCOUNTER — TELEPHONE (OUTPATIENT)
Dept: PRIMARY CARE CLINIC | Facility: CLINIC | Age: 74
End: 2024-01-31
Payer: MEDICARE

## 2024-01-31 DIAGNOSIS — R19.00 ABDOMINAL WALL BULGE: ICD-10-CM

## 2024-01-31 PROCEDURE — 76705 ECHO EXAM OF ABDOMEN: CPT | Mod: TC,PO

## 2024-01-31 PROCEDURE — 76705 ECHO EXAM OF ABDOMEN: CPT | Mod: 26,,, | Performed by: RADIOLOGY

## 2024-01-31 NOTE — TELEPHONE ENCOUNTER
----- Message from Sayda Barnhart MD sent at 1/31/2024  1:12 PM CST -----  US shows no evidence of hernia. She may move up her appointment to be seen to re examine to determine the need for any additional. Imaging or reassurance is all that sneeded. Can't recall what it looked like but the US says no evidence of hernia

## 2024-02-02 ENCOUNTER — DOCUMENTATION ONLY (OUTPATIENT)
Dept: REHABILITATION | Facility: HOSPITAL | Age: 74
End: 2024-02-02
Payer: MEDICARE

## 2024-02-02 ENCOUNTER — TELEPHONE (OUTPATIENT)
Dept: PRIMARY CARE CLINIC | Facility: CLINIC | Age: 74
End: 2024-02-02
Payer: MEDICARE

## 2024-02-02 NOTE — TELEPHONE ENCOUNTER
----- Message from Viki Stallworth sent at 1/31/2024  4:37 PM CST -----  Type:  Patient Returning Call    Who Called:TERESA MADRIGAL [[id number]]  Who Left Message for Patient:Ismael  Does the patient know what this is regarding?:results  Would the patient rather a call back or a response via [facility]? Call back   Best Call Back Number:[phone]  Additional Information: Patient incites she missed a call from the providers office in regards to her US. Patient indicates she is returning the call in regards to the results as she was asked to call back if she had any questions. Patient indicates she understands there is no hernia dn would like to know if there is going to be more orders or what the next step is.Patient indicates she is waiting for a call back with further assistance and more information.

## 2024-02-09 ENCOUNTER — DOCUMENTATION ONLY (OUTPATIENT)
Dept: REHABILITATION | Facility: HOSPITAL | Age: 74
End: 2024-02-09
Payer: MEDICARE

## 2024-02-09 NOTE — PROGRESS NOTES
"Health  Wellness Visit Note    Name: Rosalia Fernández  Clinic Number: [id number]  Physician: No ref. provider found  Diagnosis: No diagnosis found.  Past Medical History:   Diagnosis Date    Colon polyps     Diverticulitis     Endometriosis     Hemorrhage     "microhemorrhage of brain"     Infertility, female     Melanoma     MVP (mitral valve prolapse)     Osteoarthritis      Visit Number: 38  Precautions: standard      1st PT visit:  5/18/23  Year of care end date:  5/18/24  Mindbody plan: Plan A - 10 months  Patient level:b    Time In: 11:00  Time Out: 12:00  Total Treatment Time: 60    [employer]  Handout on this week's wellness topic of get outdoors provided time management along with a discussion on what it means, the benefits, and suggestions for practice.  Reviewed last week's topic of   gratitude  .    Subjective:   Patient reports that she has a little pain today in her back, but that has gone away from doing stretches and exercises. Pt will see pain management today after our apt. She states that she always has pain normally around a 2/10, but lately she has had less pain.  Pt has to use a cream to help manage her pain. States for exercise she is walking 6 days a week. She walks around 40 minutes and does her stretches after. She is also icing her back everyday around 4 pm.     Objective:   Rosalia completed therapeutic stretches (EIL, PARISA) and the following MedX exercise machines: core lumbar, torso rotation l/r, leg extension, leg curl, upright row, chest press, biceps curl, triceps extension, leg press    See exercise log in patient folder for rate of exertion and repetitions completed.       Fitness Machine Education Key:  E=education on equipment initiated and further follow up and education needed  I=independent with  and exercise.  The patient:  Adjusts machines to his/her settings  Uses equipment levers, pins, weights safely  Maintains safe and correct posture while " exercising  Moves through exercise with correct pace and control  Gets on and off equipment safely      Lumbar/Cervical Ext.  Torso Rotation  Leg Press    Leg Extension  Seated Leg Curl  Chest Press    Seated Row  Hip ADD  Hip ABD    Triceps Extension  Bicep Curl  Other:      [x] Indicates exercise has been taught for home  Lumbar/Cervical Ext. [x] Torso Rotation [x] Leg Press [x]   Leg Extension [x] Seated Leg Curl [x] Chest Press [x]   Seated Row [x] Hip ADD [x] Hip ABD [x]   Triceps Extension [x] Bicep Curl [x] Other:        Assessment:   Patient tolerated MedX Core Lumbar Strength and all other peripheral exercises without an increase in symptoms. Patient warmed up on treadmill for 8 minutes, stretched, and iced low back for 5 minutes after the workout.     Plan:  Continue with established plan of care towards wellness goals.     Health  : Constance Sykes  2/9/2024

## 2024-02-09 NOTE — PROGRESS NOTES
"Health  Wellness Visit Note    Name: Rosalia Fernández  Clinic Number: [id number]  Physician: No ref. provider found  Diagnosis: No diagnosis found.  Past Medical History:   Diagnosis Date    Colon polyps     Diverticulitis     Endometriosis     Hemorrhage     "microhemorrhage of brain"     Infertility, female     Melanoma     MVP (mitral valve prolapse)     Osteoarthritis      Visit Number: 43  Precautions: standard      1st PT visit:  5/18/23  Year of care end date:  5/18/24  Mindbody plan: Plan A - 10 months  Patient level:b    Time In: 11:00  Time Out: 12:00  Total Treatment Time: 60    [employer]  Handout on this week's wellness topic of muscle strength provided time management along with a discussion on what it means, the benefits, and suggestions for practice.  Reviewed last week's topic of meditation   Subjective:   Patient reports that she has a little pain today in her back, but that has gone away from doing stretches and exercises. She states that she always has pain normally around a 2/10, but lately she has had less pain.  Pt has to use a cream to help manage her pain. States for exercise she is walking 6 days a week. She walks around 40 minutes and does her stretches after. She is also icing her back everyday around 4 pm.     Objective:   Rosalia completed therapeutic stretches (EIL, PARISA) and the following MedX exercise machines: core lumbar, torso rotation l/r, leg extension, leg curl, upright row, chest press, biceps curl, triceps extension, leg press    See exercise log in patient folder for rate of exertion and repetitions completed.       Fitness Machine Education Key:  E=education on equipment initiated and further follow up and education needed  I=independent with  and exercise.  The patient:  Adjusts machines to his/her settings  Uses equipment levers, pins, weights safely  Maintains safe and correct posture while exercising  Moves through exercise with correct pace and " control  Gets on and off equipment safely      Lumbar/Cervical Ext.  Torso Rotation  Leg Press    Leg Extension  Seated Leg Curl  Chest Press    Seated Row  Hip ADD  Hip ABD    Triceps Extension  Bicep Curl  Other:      [x] Indicates exercise has been taught for home  Lumbar/Cervical Ext. [x] Torso Rotation [x] Leg Press [x]   Leg Extension [x] Seated Leg Curl [x] Chest Press [x]   Seated Row [x] Hip ADD [x] Hip ABD [x]   Triceps Extension [x] Bicep Curl [x] Other:        Assessment:   Patient tolerated MedX Core Lumbar Strength and all other peripheral exercises without an increase in symptoms. Patient warmed up on treadmill for 8 minutes, stretched, and iced low back for 5 minutes after the workout.     Plan:  Continue with established plan of care towards wellness goals.     Health  : Constance Sykes  2/9/2024

## 2024-02-16 ENCOUNTER — DOCUMENTATION ONLY (OUTPATIENT)
Dept: REHABILITATION | Facility: HOSPITAL | Age: 74
End: 2024-02-16
Payer: MEDICARE

## 2024-02-16 NOTE — PROGRESS NOTES
"Health  Wellness Visit Note    Name: Rosalia Fernández  Clinic Number: [id number]  Physician: No ref. provider found  Diagnosis: No diagnosis found.  Past Medical History:   Diagnosis Date    Colon polyps     Diverticulitis     Endometriosis     Hemorrhage     "microhemorrhage of brain"     Infertility, female     Melanoma     MVP (mitral valve prolapse)     Osteoarthritis      Visit Number: 44  Precautions: standard      1st PT visit:  5/18/23  Year of care end date:  5/18/24  Mindbody plan: Plan A - 10 months  Patient level:b    Time In: 11:00  Time Out: 12:00  Total Treatment Time: 60    [employer]22  Handout on this week's wellness topic of cardio provided time management along with a discussion on what it means, the benefits, and suggestions for practice.  Reviewed last week's topic of muscle strength    Subjective:   Patient reports that she has a little pain today in her back, but that has gone away from doing stretches and exercises. She states that she always has pain normally around a 2/10, but lately she has had less pain.  Pt has to use a cream to help manage her pain. States for exercise she is walking 6 days a week. She walks around 40 minutes and does her stretches after. She is also icing her back everyday around 4 pm.     Objective:   Rosalia completed therapeutic stretches (EIL, PARISA) and the following MedX exercise machines: core lumbar, torso rotation l/r, leg extension, leg curl, upright row, chest press, biceps curl, triceps extension, leg press    See exercise log in patient folder for rate of exertion and repetitions completed.       Fitness Machine Education Key:  E=education on equipment initiated and further follow up and education needed  I=independent with  and exercise.  The patient:  Adjusts machines to his/her settings  Uses equipment levers, pins, weights safely  Maintains safe and correct posture while exercising  Moves through exercise with correct pace and " control  Gets on and off equipment safely      Lumbar/Cervical Ext.  Torso Rotation  Leg Press    Leg Extension  Seated Leg Curl  Chest Press    Seated Row  Hip ADD  Hip ABD    Triceps Extension  Bicep Curl  Other:      [x] Indicates exercise has been taught for home  Lumbar/Cervical Ext. [x] Torso Rotation [x] Leg Press [x]   Leg Extension [x] Seated Leg Curl [x] Chest Press [x]   Seated Row [x] Hip ADD [x] Hip ABD [x]   Triceps Extension [x] Bicep Curl [x] Other:        Assessment:   Patient tolerated MedX Core Lumbar Strength and all other peripheral exercises without an increase in symptoms. Patient warmed up on treadmill for 8 minutes, stretched, and iced low back for 5 minutes after the workout.     Plan:  Continue with established plan of care towards wellness goals.     Health  : Constance Sykes  2/16/2024

## 2024-02-23 ENCOUNTER — DOCUMENTATION ONLY (OUTPATIENT)
Dept: REHABILITATION | Facility: HOSPITAL | Age: 74
End: 2024-02-23
Payer: MEDICARE

## 2024-02-23 NOTE — PROGRESS NOTES
"Health  Wellness Visit Note    Name: Rosalia Fernández  Clinic Number: [id number]  Physician: No ref. provider found  Diagnosis: No diagnosis found.  Past Medical History:   Diagnosis Date    Colon polyps     Diverticulitis     Endometriosis     Hemorrhage     "microhemorrhage of brain"     Infertility, female     Melanoma     MVP (mitral valve prolapse)     Osteoarthritis      Visit Number: 44  Precautions: standard      1st PT visit:  5/18/23  Year of care end date:  5/18/24  Mindbody plan: Plan A - 10 months  Patient level:b    Time In: 11:00  Time Out: 12:00  Total Treatment Time: 60    [employer]22  Handout on this week's wellness topic of cardio provided time management along with a discussion on what it means, the benefits, and suggestions for practice.  Reviewed last week's topic of muscle strength    Subjective:   Patient reports that she has a little pain today in her back, but that has gone away from doing stretches and exercises. She states that she always has pain normally around a 2/10, but lately she has had less pain.  Pt has to use a cream to help manage her pain. States for exercise she is walking 6 days a week. She walks around 40 minutes and does her stretches after. She is also icing her back everyday around 4 pm.     Objective:   Rosalia completed therapeutic stretches (EIL, PARISA) and the following MedX exercise machines: core lumbar, torso rotation l/r, leg extension, leg curl, upright row, chest press, biceps curl, triceps extension, leg press    See exercise log in patient folder for rate of exertion and repetitions completed.       Fitness Machine Education Key:  E=education on equipment initiated and further follow up and education needed  I=independent with  and exercise.  The patient:  Adjusts machines to his/her settings  Uses equipment levers, pins, weights safely  Maintains safe and correct posture while exercising  Moves through exercise with correct pace and " control  Gets on and off equipment safely      Lumbar/Cervical Ext.  Torso Rotation  Leg Press    Leg Extension  Seated Leg Curl  Chest Press    Seated Row  Hip ADD  Hip ABD    Triceps Extension  Bicep Curl  Other:      [x] Indicates exercise has been taught for home  Lumbar/Cervical Ext. [x] Torso Rotation [x] Leg Press [x]   Leg Extension [x] Seated Leg Curl [x] Chest Press [x]   Seated Row [x] Hip ADD [x] Hip ABD [x]   Triceps Extension [x] Bicep Curl [x] Other:        Assessment:   Patient tolerated MedX Core Lumbar Strength and all other peripheral exercises without an increase in symptoms. Patient warmed up on treadmill for 8 minutes, stretched, and iced low back for 5 minutes after the workout.     Plan:  Continue with established plan of care towards wellness goals.     Health  : Constance Sykes  2/23/2024

## 2024-03-01 ENCOUNTER — DOCUMENTATION ONLY (OUTPATIENT)
Dept: REHABILITATION | Facility: HOSPITAL | Age: 74
End: 2024-03-01
Payer: MEDICARE

## 2024-03-04 ENCOUNTER — DOCUMENTATION ONLY (OUTPATIENT)
Dept: REHABILITATION | Facility: HOSPITAL | Age: 74
End: 2024-03-04
Payer: MEDICARE

## 2024-03-04 NOTE — PROGRESS NOTES
"Health  Wellness Visit Note    Name: Rosalia Fernández  Clinic Number: [id number]  Physician: Sayda Barnhart MD  Diagnosis: No diagnosis found.  Past Medical History:   Diagnosis Date    Colon polyps     Diverticulitis     Endometriosis     Hemorrhage     "microhemorrhage of brain"     Infertility, female     Melanoma     MVP (mitral valve prolapse)     Osteoarthritis      Visit Number: 45  Precautions: standard      1st PT visit:  5/18/23  Year of care end date:  5/18/24  Mindbody plan: Plan A - 10 months  Patient level:b    Time In: 11:00  Time Out: 12:00  Total Treatment Time: 60    [employer] 2022  Handout on this week's wellness topic of cardio provided time management along with a discussion on what it means, the benefits, and suggestions for practice.  Reviewed last week's topic of muscle strength    Subjective:   Patient reports that she has a little pain today in her back, but that has gone away from doing stretches and exercises. She states that she always has pain normally around a 2/10, but lately she has had less pain.  Pt has to use a cream to help manage her pain. States for exercise she is walking 6 days a week. She walks around 40 minutes and does her stretches after. She is also icing her back everyday around 4 pm.     Objective:   Rosalia completed therapeutic stretches (EIL, PARISA) and the following MedX exercise machines: core lumbar, torso rotation l/r, leg extension, leg curl, upright row, chest press, biceps curl, triceps extension, leg press    See exercise log in patient folder for rate of exertion and repetitions completed.       Fitness Machine Education Key:  E=education on equipment initiated and further follow up and education needed  I=independent with  and exercise.  The patient:  Adjusts machines to his/her settings  Uses equipment levers, pins, weights safely  Maintains safe and correct posture while exercising  Moves through exercise with correct pace and " control  Gets on and off equipment safely      Lumbar/Cervical Ext.  Torso Rotation  Leg Press    Leg Extension  Seated Leg Curl  Chest Press    Seated Row  Hip ADD  Hip ABD    Triceps Extension  Bicep Curl  Other:      [x] Indicates exercise has been taught for home  Lumbar/Cervical Ext. [x] Torso Rotation [x] Leg Press [x]   Leg Extension [x] Seated Leg Curl [x] Chest Press [x]   Seated Row [x] Hip ADD [x] Hip ABD [x]   Triceps Extension [x] Bicep Curl [x] Other:        Assessment:   Patient tolerated MedX Core Lumbar Strength and all other peripheral exercises without an increase in symptoms. Patient warmed up on treadmill for 8 minutes, stretched, and iced low back for 5 minutes after the workout.     Plan:  Continue with established plan of care towards wellness goals.     Health  : Constance Sykes  3/4/2024

## 2024-03-12 ENCOUNTER — LAB VISIT (OUTPATIENT)
Dept: LAB | Facility: HOSPITAL | Age: 74
End: 2024-03-12
Attending: INTERNAL MEDICINE
Payer: MEDICARE

## 2024-03-12 DIAGNOSIS — M85.89 OSTEOPENIA OF MULTIPLE SITES: ICD-10-CM

## 2024-03-12 DIAGNOSIS — R73.03 PREDIABETES: ICD-10-CM

## 2024-03-12 DIAGNOSIS — R53.83 FATIGUE, UNSPECIFIED TYPE: ICD-10-CM

## 2024-03-12 DIAGNOSIS — E55.9 VITAMIN D DEFICIENCY: ICD-10-CM

## 2024-03-12 DIAGNOSIS — E78.2 MIXED HYPERLIPIDEMIA: ICD-10-CM

## 2024-03-12 LAB
ALBUMIN SERPL BCP-MCNC: 4 G/DL (ref 3.5–5.2)
ALP SERPL-CCNC: 68 U/L (ref 55–135)
ALT SERPL W/O P-5'-P-CCNC: 18 U/L (ref 10–44)
ANION GAP SERPL CALC-SCNC: 6 MMOL/L (ref 8–16)
AST SERPL-CCNC: 19 U/L (ref 10–40)
BASOPHILS # BLD AUTO: 0.04 K/UL (ref 0–0.2)
BASOPHILS NFR BLD: 0.8 % (ref 0–1.9)
BILIRUB SERPL-MCNC: 0.5 MG/DL (ref 0.1–1)
BUN SERPL-MCNC: 16 MG/DL (ref 8–23)
CALCIUM SERPL-MCNC: 9.8 MG/DL (ref 8.7–10.5)
CHLORIDE SERPL-SCNC: 108 MMOL/L (ref 95–110)
CHOLEST SERPL-MCNC: 202 MG/DL (ref 120–199)
CHOLEST/HDLC SERPL: 3.3 {RATIO} (ref 2–5)
CO2 SERPL-SCNC: 28 MMOL/L (ref 23–29)
CREAT SERPL-MCNC: 0.8 MG/DL (ref 0.5–1.4)
DIFFERENTIAL METHOD BLD: ABNORMAL
EOSINOPHIL # BLD AUTO: 0.1 K/UL (ref 0–0.5)
EOSINOPHIL NFR BLD: 1.1 % (ref 0–8)
ERYTHROCYTE [DISTWIDTH] IN BLOOD BY AUTOMATED COUNT: 12.1 % (ref 11.5–14.5)
EST. GFR  (NO RACE VARIABLE): >60 ML/MIN/1.73 M^2
ESTIMATED AVG GLUCOSE: 108 MG/DL (ref 68–131)
GLUCOSE SERPL-MCNC: 104 MG/DL (ref 70–110)
HBA1C MFR BLD: 5.4 % (ref 4–5.6)
HCT VFR BLD AUTO: 45.3 % (ref 37–48.5)
HDLC SERPL-MCNC: 61 MG/DL (ref 40–75)
HDLC SERPL: 30.2 % (ref 20–50)
HGB BLD-MCNC: 14.8 G/DL (ref 12–16)
IMM GRANULOCYTES # BLD AUTO: 0.01 K/UL (ref 0–0.04)
IMM GRANULOCYTES NFR BLD AUTO: 0.2 % (ref 0–0.5)
LDLC SERPL CALC-MCNC: 124.6 MG/DL (ref 63–159)
LYMPHOCYTES # BLD AUTO: 1.7 K/UL (ref 1–4.8)
LYMPHOCYTES NFR BLD: 33 % (ref 18–48)
MCH RBC QN AUTO: 31.2 PG (ref 27–31)
MCHC RBC AUTO-ENTMCNC: 32.7 G/DL (ref 32–36)
MCV RBC AUTO: 95 FL (ref 82–98)
MONOCYTES # BLD AUTO: 0.4 K/UL (ref 0.3–1)
MONOCYTES NFR BLD: 7.3 % (ref 4–15)
NEUTROPHILS # BLD AUTO: 3 K/UL (ref 1.8–7.7)
NEUTROPHILS NFR BLD: 57.6 % (ref 38–73)
NONHDLC SERPL-MCNC: 141 MG/DL
NRBC BLD-RTO: 0 /100 WBC
PLATELET # BLD AUTO: 252 K/UL (ref 150–450)
PMV BLD AUTO: 9.8 FL (ref 9.2–12.9)
POTASSIUM SERPL-SCNC: 5.1 MMOL/L (ref 3.5–5.1)
PROT SERPL-MCNC: 6.7 G/DL (ref 6–8.4)
RBC # BLD AUTO: 4.75 M/UL (ref 4–5.4)
SODIUM SERPL-SCNC: 142 MMOL/L (ref 136–145)
TRIGL SERPL-MCNC: 82 MG/DL (ref 30–150)
TSH SERPL DL<=0.005 MIU/L-ACNC: 1.5 UIU/ML (ref 0.4–4)
WBC # BLD AUTO: 5.22 K/UL (ref 3.9–12.7)

## 2024-03-12 PROCEDURE — 36415 COLL VENOUS BLD VENIPUNCTURE: CPT | Mod: PO | Performed by: INTERNAL MEDICINE

## 2024-03-12 PROCEDURE — 80061 LIPID PANEL: CPT | Performed by: INTERNAL MEDICINE

## 2024-03-12 PROCEDURE — 85025 COMPLETE CBC W/AUTO DIFF WBC: CPT | Performed by: INTERNAL MEDICINE

## 2024-03-12 PROCEDURE — 83036 HEMOGLOBIN GLYCOSYLATED A1C: CPT | Performed by: INTERNAL MEDICINE

## 2024-03-12 PROCEDURE — 80053 COMPREHEN METABOLIC PANEL: CPT | Performed by: INTERNAL MEDICINE

## 2024-03-12 PROCEDURE — 84443 ASSAY THYROID STIM HORMONE: CPT | Performed by: INTERNAL MEDICINE

## 2024-03-15 ENCOUNTER — DOCUMENTATION ONLY (OUTPATIENT)
Dept: REHABILITATION | Facility: HOSPITAL | Age: 74
End: 2024-03-15
Payer: MEDICARE

## 2024-03-15 NOTE — PROGRESS NOTES
"Health  Wellness Visit Note    Name: Rosalia Fernández  Clinic Number: [id number]  Physician: Sayda Barnhart MD  Diagnosis: No diagnosis found.  Past Medical History:   Diagnosis Date    Colon polyps     Diverticulitis     Endometriosis     Hemorrhage     "microhemorrhage of brain"     Infertility, female     Melanoma     MVP (mitral valve prolapse)     Osteoarthritis      Visit Number: 46  Precautions: standard      1st PT visit:  5/18/23  Year of care end date:  5/18/24  Mindbody plan: Plan A - 10 months  Patient level:b    Time In: 11:00  Time Out: 12:00  Total Treatment Time: 60    [employer] 2022  Handout on this week's wellness topic of cardio provided time management along with a discussion on what it means, the benefits, and suggestions for practice.  Reviewed last week's topic of muscle strength    Subjective:   Patient reports that she has a little pain today in her back, but that has gone away from doing stretches and exercises. She states that she always has pain normally around a 2/10, but lately she has had less pain.  Pt has to use a cream to help manage her pain. States for exercise she is walking 6 days a week. She walks around 40 minutes and does her stretches after. She is also icing her back everyday around 4 pm.     Objective:   Rosalia completed therapeutic stretches (EIL, PARISA) and the following MedX exercise machines: core lumbar, torso rotation l/r, leg extension, leg curl, upright row, chest press, biceps curl, triceps extension, leg press    See exercise log in patient folder for rate of exertion and repetitions completed.       Fitness Machine Education Key:  E=education on equipment initiated and further follow up and education needed  I=independent with  and exercise.  The patient:  Adjusts machines to his/her settings  Uses equipment levers, pins, weights safely  Maintains safe and correct posture while exercising  Moves through exercise with correct pace and " control  Gets on and off equipment safely      Lumbar/Cervical Ext.  Torso Rotation  Leg Press    Leg Extension  Seated Leg Curl  Chest Press    Seated Row  Hip ADD  Hip ABD    Triceps Extension  Bicep Curl  Other:      [x] Indicates exercise has been taught for home  Lumbar/Cervical Ext. [x] Torso Rotation [x] Leg Press [x]   Leg Extension [x] Seated Leg Curl [x] Chest Press [x]   Seated Row [x] Hip ADD [x] Hip ABD [x]   Triceps Extension [x] Bicep Curl [x] Other:        Assessment:   Patient tolerated MedX Core Lumbar Strength and all other peripheral exercises without an increase in symptoms. Patient warmed up on treadmill for 8 minutes, stretched, and iced low back for 5 minutes after the workout.     Plan:  Continue with established plan of care towards wellness goals.     Health  : Constance Sykes  3/15/2024

## 2024-03-18 ENCOUNTER — OFFICE VISIT (OUTPATIENT)
Dept: PRIMARY CARE CLINIC | Facility: CLINIC | Age: 74
End: 2024-03-18
Payer: MEDICARE

## 2024-03-18 VITALS
WEIGHT: 108 LBS | TEMPERATURE: 98 F | HEART RATE: 85 BPM | RESPIRATION RATE: 18 BRPM | OXYGEN SATURATION: 99 % | HEIGHT: 64 IN | SYSTOLIC BLOOD PRESSURE: 128 MMHG | BODY MASS INDEX: 18.44 KG/M2 | DIASTOLIC BLOOD PRESSURE: 62 MMHG

## 2024-03-18 DIAGNOSIS — R19.00 INTRA-ABDOMINAL AND PELVIC SWELLING, MASS AND LUMP, UNSPECIFIED SITE: ICD-10-CM

## 2024-03-18 DIAGNOSIS — F33.9 DEPRESSION, RECURRENT: ICD-10-CM

## 2024-03-18 DIAGNOSIS — R19.8 CHANGE IN BOWEL FUNCTION: ICD-10-CM

## 2024-03-18 DIAGNOSIS — M25.511 CHRONIC RIGHT SHOULDER PAIN: ICD-10-CM

## 2024-03-18 DIAGNOSIS — R29.90 EPISODE OF TRANSIENT NEUROLOGIC SYMPTOMS: Primary | ICD-10-CM

## 2024-03-18 DIAGNOSIS — E55.9 VITAMIN D DEFICIENCY: ICD-10-CM

## 2024-03-18 DIAGNOSIS — H93.13 TINNITUS OF BOTH EARS: ICD-10-CM

## 2024-03-18 DIAGNOSIS — M85.89 OSTEOPENIA OF MULTIPLE SITES: ICD-10-CM

## 2024-03-18 DIAGNOSIS — G89.29 CHRONIC RIGHT SHOULDER PAIN: ICD-10-CM

## 2024-03-18 DIAGNOSIS — R73.03 PREDIABETES: ICD-10-CM

## 2024-03-18 DIAGNOSIS — I34.1 MVP (MITRAL VALVE PROLAPSE): ICD-10-CM

## 2024-03-18 DIAGNOSIS — H90.3 SENSORINEURAL HEARING LOSS (SNHL) OF BOTH EARS: ICD-10-CM

## 2024-03-18 DIAGNOSIS — H81.11 BENIGN PAROXYSMAL POSITIONAL VERTIGO OF RIGHT EAR: ICD-10-CM

## 2024-03-18 DIAGNOSIS — R10.12 LEFT UPPER QUADRANT PAIN: ICD-10-CM

## 2024-03-18 PROCEDURE — 1159F MED LIST DOCD IN RCRD: CPT | Mod: CPTII,S$GLB,, | Performed by: INTERNAL MEDICINE

## 2024-03-18 PROCEDURE — 99214 OFFICE O/P EST MOD 30 MIN: CPT | Mod: S$GLB,,, | Performed by: INTERNAL MEDICINE

## 2024-03-18 PROCEDURE — 99999 PR PBB SHADOW E&M-EST. PATIENT-LVL V: CPT | Mod: PBBFAC,,, | Performed by: INTERNAL MEDICINE

## 2024-03-18 PROCEDURE — 3078F DIAST BP <80 MM HG: CPT | Mod: CPTII,S$GLB,, | Performed by: INTERNAL MEDICINE

## 2024-03-18 PROCEDURE — 1126F AMNT PAIN NOTED NONE PRSNT: CPT | Mod: CPTII,S$GLB,, | Performed by: INTERNAL MEDICINE

## 2024-03-18 PROCEDURE — 3044F HG A1C LEVEL LT 7.0%: CPT | Mod: CPTII,S$GLB,, | Performed by: INTERNAL MEDICINE

## 2024-03-18 PROCEDURE — 3074F SYST BP LT 130 MM HG: CPT | Mod: CPTII,S$GLB,, | Performed by: INTERNAL MEDICINE

## 2024-03-18 PROCEDURE — 1160F RVW MEDS BY RX/DR IN RCRD: CPT | Mod: CPTII,S$GLB,, | Performed by: INTERNAL MEDICINE

## 2024-03-18 NOTE — PATIENT INSTRUCTIONS
For the bowel issues,  maybe try once per day see if your stools firm up some. If not and you're constipated again, then restart the metamucil.

## 2024-03-18 NOTE — PROGRESS NOTES
"INTERNAL MEDICINE PROGRESS/URGENT CARE NOTE    CHIEF COMPLAINT     Chief Complaint   Patient presents with    Follow-up     4 mo f/u       Mass     RLQ    Neck Pain     When turning head to the right     Eye Problem     Discuss possible cataract surgery      Toe Pain     Right toe pain in the mornings     Altered Mental Status     Pt states she has confusion more often     Memory Loss     Forgetfulness, remembering, comprehending    GI Problem     Diarrhea & constipation        HPI     Rosalia Fernández is a 73 y.o.  female who presents with a PMHx of  diverticulitis, melanoma, MVP, osteoarthritis. Who presents today for routine follow up visit.       Her concerns and questions today are:     Left neck stiffness and pain.   2. Mass on her abdomen. Says there a bulge when she exercises. Has an US done which showed no hernia.   3. Changes in bowel  movements. Says less firm than they were a year ago. Started taking metamucil which helped with her diarrhea.   4. Changes in vision. Needs cataract surgery. Has just "been talking about it"   5. Forgetfulness. Says its nothing new. Its always been this way. Mentions neurology testing and she says she sees Dr. Nagy.   6. Toe pain in the am. Went away when she had ablation for her lower back pain.   7.still thinks something is wrong in her colon. Says she feels a bulge and that combined with her bowel movements really worries her.        Her main complaints and concerns are: lots of medical concerns including but not limited to   Has a scheduled ablation for her back   Bitemporal pressures: has been seeing neurology. MRI shows no changes   Anxiety CARLEEN 7 today is 8/21 down from 18/21. Admittedly anxious but says due to her back issues which took a while       Home Medications:  Prior to Admission medications    Medication Sig Start Date End Date Taking? Authorizing Provider   acetaminophen (TYLENOL) 500 MG tablet Take 1,000 mg by mouth every 6 (six) hours as needed for " "Pain.    Provider, Historical   ascorbic acid (VITAMIN C) 500 MG tablet Take 500 mg by mouth once daily.    Provider, Historical   azelastine (ASTELIN) 137 mcg (0.1 %) nasal spray 1 spray (137 mcg total) by Nasal route 2 (two) times daily. 9/26/23 9/25/24  Cheryl Samson MD   CALCIUM CITRATE/VITAMIN D3 (CALCIUM CITRATE + ORAL) Take 1 tablet by mouth Daily.    Provider, Historical   cholecalciferol, vitamin D3, (VITAMIN D3) 50 mcg (2,000 unit) Cap capsule Take 2,000 Units by mouth once daily.    Provider, Historical   coQ10, ubiquinol, 100 mg Cap Take 1 tablet by mouth once daily.    Provider, Historical   creatine, bulk, 100 % Powd by Misc.(Non-Drug; Combo Route) route.    Provider, Historical   krill oil 500 mg Cap Take 2 capsules by mouth Daily.    Provider, Historical   LACTOBACILLUS ACIDOPHILUS (PROBIOTIC ORAL) Take 1 tablet by mouth Daily.    Provider, Historical   LIDOcaine 4 % PtMd Apply topically daily as needed.    Provider, Historical   microfibrillar collagen powder Apply 1 g topically as needed.    Provider, Historical   multivitamin (THERAGRAN) per tablet Take 1 tablet by mouth once daily.    Provider, Historical   psyllium (METAMUCIL) powder Take 1 packet by mouth 3 (three) times daily.    Provider, Historical       Review of Systems:  Review of Systems        PHYSICAL EXAM     /62 (BP Location: Right arm, Patient Position: Sitting, BP Method: Medium (Manual))   Pulse 85   Temp 97.6 °F (36.4 °C) (Oral)   Resp 18   Ht 5' 4" (1.626 m)   Wt 49 kg (108 lb 0.4 oz)   SpO2 99%   BMI 18.54 kg/m²     GEN - A+OX4, NAD   HEENT - PERRL, EOMI, OP clear  Neck - No thyromegaly or cervical LAD. No thyroid masses felt.  CV - RRR, no m/r   Chest - CTAB, no wheezing or rhonchi  Abd - S/NT/ND/+BS.   Ext - 2+BDP and radial pulses. No C/C/E.  Skin - No rash.    LABS         ASSESSMENT/PLAN     Rosalia Fernández is a 73 y.o. female with  1. Episode of transient neurologic symptoms  Overview:  Follows with " "neurology. Says was instructed not to take ASA du eto bleeding risk.   Details having episodes where she cannot lift both arms       2. Depression, recurrent  Overview:  Declines this diagnosis. Says its more stress and anxiety  Denies SI/HI  Declines medication and therapy. "doesn't need it"       3. Benign paroxysmal positional vertigo of right ear  Overview:  Resolved   Encouraged head excercises      4. Tinnitus of both ears  Stable     5. Sensorineural hearing loss (SNHL) of both ears  Overview:  With hearing aids which work well for her       6. MVP (mitral valve prolapse)  Overview:  Asymptomatic  Continue to monitor      7. Vitamin D deficiency  Overview:  Continue with vitamin D supplementation       8. Prediabetes  Overview:  Counseled on limiting carbs in her diet       9. Osteopenia of multiple sites  Overview:  Encouraged calcium and vitamin D  Encouraged gait  Building excercises and fall prevention      10. Chronic right shoulder pain  Pain controlled           WORRY SCORE 3    RTC in 3 months, sooner if needed and depending on labs.    Sayda Barnhart MD  Board Certified Internist/Geriatrician  [facility] ([place])      "

## 2024-03-22 ENCOUNTER — DOCUMENTATION ONLY (OUTPATIENT)
Dept: REHABILITATION | Facility: HOSPITAL | Age: 74
End: 2024-03-22
Payer: MEDICARE

## 2024-03-22 NOTE — PROGRESS NOTES
"Health  Wellness Visit Note    Name: Rosalia Fernández  Clinic Number: [id number]  Physician: Sayda Barnhart MD  Diagnosis: No diagnosis found.  Past Medical History:   Diagnosis Date    Colon polyps     Diverticulitis     Endometriosis     Hemorrhage     "microhemorrhage of brain"     Infertility, female     Melanoma     MVP (mitral valve prolapse)     Osteoarthritis      Visit Number: 46  Precautions: standard      1st PT visit:  5/18/23  Year of care end date:  5/18/24  Mindbody plan: Plan A - 10 months  Patient level:b    Time In: 11:00  Time Out: 12:00  Total Treatment Time: 60    [employer] 2022  Handout on this week's wellness topic of cardio provided time management along with a discussion on what it means, the benefits, and suggestions for practice.  Reviewed last week's topic of muscle strength    Subjective:   Patient reports that she has a little pain today in her back, but that has gone away from doing stretches and exercises. She states that she always has pain normally around a 2/10, but lately she has had less pain.  Pt has to use a cream to help manage her pain. States for exercise she is walking 6 days a week. She walks around 40 minutes and does her stretches after. She is also icing her back everyday around 4 pm.     Objective:   Rosalia completed therapeutic stretches (EIL, PARISA) and the following MedX exercise machines: core lumbar, torso rotation l/r, leg extension, leg curl, upright row, chest press, biceps curl, triceps extension, leg press    See exercise log in patient folder for rate of exertion and repetitions completed.       Fitness Machine Education Key:  E=education on equipment initiated and further follow up and education needed  I=independent with  and exercise.  The patient:  Adjusts machines to his/her settings  Uses equipment levers, pins, weights safely  Maintains safe and correct posture while exercising  Moves through exercise with correct pace and " control  Gets on and off equipment safely      Lumbar/Cervical Ext.  Torso Rotation  Leg Press    Leg Extension  Seated Leg Curl  Chest Press    Seated Row  Hip ADD  Hip ABD    Triceps Extension  Bicep Curl  Other:      [x] Indicates exercise has been taught for home  Lumbar/Cervical Ext. [x] Torso Rotation [x] Leg Press [x]   Leg Extension [x] Seated Leg Curl [x] Chest Press [x]   Seated Row [x] Hip ADD [x] Hip ABD [x]   Triceps Extension [x] Bicep Curl [x] Other:        Assessment:   Patient tolerated MedX Core Lumbar Strength and all other peripheral exercises without an increase in symptoms. Patient warmed up on treadmill for 8 minutes, stretched, and iced low back for 5 minutes after the workout.     Plan:  Continue with established plan of care towards wellness goals.     Health  : Constance Sykes  3/22/2024

## 2024-03-25 ENCOUNTER — DOCUMENTATION ONLY (OUTPATIENT)
Dept: REHABILITATION | Facility: HOSPITAL | Age: 74
End: 2024-03-25
Payer: MEDICARE

## 2024-03-25 NOTE — PROGRESS NOTES
"Health  Wellness Visit Note    Name: Rosalia Fernández  Clinic Number: [id number]  Physician: Sayda Barnhart MD  Diagnosis: No diagnosis found.  Past Medical History:   Diagnosis Date    Colon polyps     Diverticulitis     Endometriosis     Hemorrhage     "microhemorrhage of brain"     Infertility, female     Melanoma     MVP (mitral valve prolapse)     Osteoarthritis      Visit Number: 47  Precautions: standard      1st PT visit:  5/18/23  Year of care end date:  5/18/24  Mindbody plan: Plan A - 10 months  Patient level:b    Time In: 11:00  Time Out: 12:00  Total Treatment Time: 60    [employer] 2022  Handout on this week's wellness topic of anti inflammatory diet  provided time management along with a discussion on what it means, the benefits, and suggestions for practice.  Reviewed last week's topic of hydration     Subjective:   Patient reports that she has a little pain today in her back, but that has gone away from doing stretches and exercises. She states that she always has pain normally around a 2/10, but lately she has had less pain.  Pt has to use a cream to help manage her pain. States for exercise she is walking 6 days a week. She walks around 40 minutes and does her stretches after. She is also icing her back everyday around 4 pm.     Objective:   Rosalia completed therapeutic stretches (EIL, PARISA) and the following MedX exercise machines: core lumbar, torso rotation l/r, leg extension, leg curl, upright row, chest press, biceps curl, triceps extension, leg press    See exercise log in patient folder for rate of exertion and repetitions completed.       Fitness Machine Education Key:  E=education on equipment initiated and further follow up and education needed  I=independent with  and exercise.  The patient:  Adjusts machines to his/her settings  Uses equipment levers, pins, weights safely  Maintains safe and correct posture while exercising  Moves through exercise with " correct pace and control  Gets on and off equipment safely      Lumbar/Cervical Ext.  Torso Rotation  Leg Press    Leg Extension  Seated Leg Curl  Chest Press    Seated Row  Hip ADD  Hip ABD    Triceps Extension  Bicep Curl  Other:      [x] Indicates exercise has been taught for home  Lumbar/Cervical Ext. [x] Torso Rotation [x] Leg Press [x]   Leg Extension [x] Seated Leg Curl [x] Chest Press [x]   Seated Row [x] Hip ADD [x] Hip ABD [x]   Triceps Extension [x] Bicep Curl [x] Other:        Assessment:   Patient tolerated MedX Core Lumbar Strength and all other peripheral exercises without an increase in symptoms. Patient warmed up on treadmill for 8 minutes, stretched, and iced low back for 5 minutes after the workout.     Plan:  Continue with established plan of care towards wellness goals.     Health  : Constance Sykes  3/25/2024

## 2024-03-27 ENCOUNTER — TELEPHONE (OUTPATIENT)
Dept: PRIMARY CARE CLINIC | Facility: CLINIC | Age: 74
End: 2024-03-27
Payer: MEDICARE

## 2024-03-27 ENCOUNTER — HOSPITAL ENCOUNTER (OUTPATIENT)
Dept: RADIOLOGY | Facility: HOSPITAL | Age: 74
Discharge: HOME | End: 2024-03-27
Attending: INTERNAL MEDICINE
Payer: MEDICARE

## 2024-03-27 PROCEDURE — 74178 CT ABD&PLV WO CNTR FLWD CNTR: CPT | Mod: 26,,, | Performed by: RADIOLOGY

## 2024-03-27 PROCEDURE — 74178 CT ABD&PLV WO CNTR FLWD CNTR: CPT | Mod: TC,PO

## 2024-03-27 PROCEDURE — A9698 NON-RAD CONTRAST MATERIALNOC: HCPCS | Mod: PO | Performed by: INTERNAL MEDICINE

## 2024-03-27 PROCEDURE — 25500020 PHARM REV CODE 255: Mod: PO | Performed by: INTERNAL MEDICINE

## 2024-03-27 RX ADMIN — IOHEXOL 75 ML: 350 INJECTION, SOLUTION INTRAVENOUS at 09:03

## 2024-03-27 RX ADMIN — IOHEXOL 1000 ML: 9 SOLUTION ORAL at 09:03

## 2024-03-27 NOTE — TELEPHONE ENCOUNTER
Spoke with patient, informing her of Dr. Barnhart's comments. She verbalized understanding. Patient scheduled for 4/3/24.

## 2024-03-27 NOTE — TELEPHONE ENCOUNTER
----- Message from Sayda Barnhart MD sent at 3/27/2024 12:57 PM CDT -----  Please have patient come in to discuss the CT. Nothing concerning nor alarming but too much to discuss over the phone.

## 2024-04-03 ENCOUNTER — OFFICE VISIT (OUTPATIENT)
Dept: PRIMARY CARE CLINIC | Facility: CLINIC | Age: 74
End: 2024-04-03
Payer: MEDICARE

## 2024-04-03 VITALS
HEART RATE: 94 BPM | RESPIRATION RATE: 18 BRPM | BODY MASS INDEX: 18.71 KG/M2 | OXYGEN SATURATION: 99 % | DIASTOLIC BLOOD PRESSURE: 78 MMHG | WEIGHT: 109 LBS | SYSTOLIC BLOOD PRESSURE: 118 MMHG

## 2024-04-03 DIAGNOSIS — E78.2 MIXED HYPERLIPIDEMIA: ICD-10-CM

## 2024-04-03 DIAGNOSIS — H93.13 TINNITUS OF BOTH EARS: ICD-10-CM

## 2024-04-03 DIAGNOSIS — F33.9 DEPRESSION, RECURRENT: ICD-10-CM

## 2024-04-03 DIAGNOSIS — R73.03 PREDIABETES: ICD-10-CM

## 2024-04-03 DIAGNOSIS — I34.1 MVP (MITRAL VALVE PROLAPSE): ICD-10-CM

## 2024-04-03 DIAGNOSIS — K59.9 BOWEL DYSFUNCTION: ICD-10-CM

## 2024-04-03 DIAGNOSIS — H81.11 BENIGN PAROXYSMAL POSITIONAL VERTIGO OF RIGHT EAR: ICD-10-CM

## 2024-04-03 DIAGNOSIS — I87.1 RENAL VEIN STENOSIS: ICD-10-CM

## 2024-04-03 DIAGNOSIS — M85.89 OSTEOPENIA OF MULTIPLE SITES: ICD-10-CM

## 2024-04-03 DIAGNOSIS — H90.3 SENSORINEURAL HEARING LOSS (SNHL) OF BOTH EARS: ICD-10-CM

## 2024-04-03 DIAGNOSIS — R29.90 EPISODE OF TRANSIENT NEUROLOGIC SYMPTOMS: Primary | ICD-10-CM

## 2024-04-03 DIAGNOSIS — E55.9 VITAMIN D DEFICIENCY: ICD-10-CM

## 2024-04-03 DIAGNOSIS — K86.9 ENLARGED PANCREAS: ICD-10-CM

## 2024-04-03 DIAGNOSIS — R53.83 FATIGUE, UNSPECIFIED TYPE: ICD-10-CM

## 2024-04-03 PROCEDURE — 1101F PT FALLS ASSESS-DOCD LE1/YR: CPT | Mod: CPTII,S$GLB,, | Performed by: INTERNAL MEDICINE

## 2024-04-03 PROCEDURE — 99999 PR PBB SHADOW E&M-EST. PATIENT-LVL III: CPT | Mod: PBBFAC,,, | Performed by: INTERNAL MEDICINE

## 2024-04-03 PROCEDURE — 3044F HG A1C LEVEL LT 7.0%: CPT | Mod: CPTII,S$GLB,, | Performed by: INTERNAL MEDICINE

## 2024-04-03 PROCEDURE — 1126F AMNT PAIN NOTED NONE PRSNT: CPT | Mod: CPTII,S$GLB,, | Performed by: INTERNAL MEDICINE

## 2024-04-03 PROCEDURE — 3288F FALL RISK ASSESSMENT DOCD: CPT | Mod: CPTII,S$GLB,, | Performed by: INTERNAL MEDICINE

## 2024-04-03 PROCEDURE — 3074F SYST BP LT 130 MM HG: CPT | Mod: CPTII,S$GLB,, | Performed by: INTERNAL MEDICINE

## 2024-04-03 PROCEDURE — 3078F DIAST BP <80 MM HG: CPT | Mod: CPTII,S$GLB,, | Performed by: INTERNAL MEDICINE

## 2024-04-03 PROCEDURE — 99214 OFFICE O/P EST MOD 30 MIN: CPT | Mod: S$GLB,,, | Performed by: INTERNAL MEDICINE

## 2024-04-03 PROCEDURE — 1159F MED LIST DOCD IN RCRD: CPT | Mod: CPTII,S$GLB,, | Performed by: INTERNAL MEDICINE

## 2024-04-03 PROCEDURE — 1160F RVW MEDS BY RX/DR IN RCRD: CPT | Mod: CPTII,S$GLB,, | Performed by: INTERNAL MEDICINE

## 2024-04-03 RX ORDER — ROSUVASTATIN CALCIUM 10 MG/1
10 TABLET, COATED ORAL DAILY
Qty: 90 TABLET | Refills: 3 | Status: SHIPPED | OUTPATIENT
Start: 2024-04-03 | End: 2025-04-03

## 2024-04-03 NOTE — PROGRESS NOTES
"INTERNAL MEDICINE PROGRESS/URGENT CARE NOTE    CHIEF COMPLAINT     Chief Complaint   Patient presents with    Follow-up     Discuss recent CT   Medication review for probiotics   Discuss metamucil        HPI     Rosalia Fernández is a very tricia 73 y.o.  female who presents with a PMHx of  diverticulitis, melanoma, MVP, osteoarthritis. Who presents today for routine follow up visit.         Her concerns and questions today are:   Still having soft  stools. Has an appointment with gastro. reviewed her colonscnoopy and is wnl done in 2021. But she's had bowel changes since then so I will refer her to gastro to review CT findings and discuss symptoms.   Reveiwed CT scan. Ordered all appropriate follow up. Ie pancreatic enzymes     At her previous visit, the complaints included but not limited to   Left neck stiffness and pain.   2. Mass on her abdomen. Says there a bulge when she exercises. Has an US done which showed no hernia.   3. Changes in bowel  movements. Says less firm than they were a year ago. Started taking metamucil which helped with her diarrhea.   4. Changes in vision. Needs cataract surgery. Has just "been talking about it"   5. Forgetfulness. Says its nothing new. Its always been this way. Mentions neurology testing and she says she sees Dr. Nagy.   6. Toe pain in the am. Went away when she had ablation for her lower back pain.   7.still thinks something is wrong in her colon. Says she feels a bulge and that combined with her bowel movements really worries her.     Has a scheduled ablation for her back   Bitemporal pressures: has been seeing neurology. MRI shows no changes   Anxiety CARLEEN 7 today is 8/21 down from 18/21. Admittedly anxious but says due to her back issues which took a while    HLD: LDL is not at goal. Statin therapy agreed to today.     History of melanoma: her last visit with dermatology was        Home Medications:  Prior to Admission medications    Medication Sig Start Date End Date " Taking? Authorizing Provider   acetaminophen (TYLENOL) 500 MG tablet Take 1,000 mg by mouth every 6 (six) hours as needed for Pain.    Provider, Historical   ascorbic acid (VITAMIN C) 500 MG tablet Take 500 mg by mouth once daily.    Provider, Historical   azelastine (ASTELIN) 137 mcg (0.1 %) nasal spray 1 spray (137 mcg total) by Nasal route 2 (two) times daily. 9/26/23 9/25/24  Cheryl Samson MD   CALCIUM CITRATE/VITAMIN D3 (CALCIUM CITRATE + ORAL) Take 1 tablet by mouth Daily.    Provider, Historical   cholecalciferol, vitamin D3, (VITAMIN D3) 50 mcg (2,000 unit) Cap capsule Take 2,000 Units by mouth once daily.    Provider, Historical   coQ10, ubiquinol, 100 mg Cap Take 1 tablet by mouth once daily.    Provider, Historical   krill oil 500 mg Cap Take 2 capsules by mouth Daily.    Provider, Historical   LACTOBACILLUS ACIDOPHILUS (PROBIOTIC ORAL) Take 1 tablet by mouth Daily.    Provider, Historical   LIDOcaine 4 % PtMd Apply topically daily as needed.    Provider, Historical   microfibrillar collagen powder Apply 1 g topically as needed.    Provider, Historical   multivitamin (THERAGRAN) per tablet Take 1 tablet by mouth once daily.    Provider, Historical   psyllium (METAMUCIL) powder Take 1 packet by mouth 3 (three) times daily.    Provider, Historical       Review of Systems:  Review of Systems        PHYSICAL EXAM     /78 (BP Location: Left arm, Patient Position: Sitting, BP Method: Medium (Manual))   Pulse 94   Resp 18   Wt 49.5 kg (109 lb 0.3 oz)   SpO2 99%   BMI 18.71 kg/m²     GEN - A+OX4, NAD       LABS     ASSESSMENT/PLAN     Rosalia Fernández is a 73 y.o. female with  1. Episode of transient neurologic symptoms  Overview:  Follows with neurology. Says was instructed not to take ASA du eto bleeding risk.   Details having episodes where she cannot lift both arms       2. Depression, recurrent  Overview:  Declines this diagnosis. Says its more stress and anxiety  Denies SI/HI  Declines  "medication and therapy. "doesn't need it"       3. Benign paroxysmal positional vertigo of right ear  Overview:  Resolved   Encouraged head excercises      4. Tinnitus of both ears  Stable     5. Sensorineural hearing loss (SNHL) of both ears  Overview:  With hearing aids which work well for her       6. MVP (mitral valve prolapse)  Overview:  Asymptomatic  Continue to monitor      7. Vitamin D deficiency  Overview:  Continue with vitamin D supplementation       8. Prediabetes  Overview:  Counseled on limiting carbs in her diet       9. Osteopenia of multiple sites  Overview:  Encouraged calcium and vitamin D  Encouraged gait  Building excercises and fall prevention             WORRY SCORE 2    RTC in 3 months, sooner if needed and depending on labs.    Sayda Barnhart MD  Board Certified Internist/Geriatrician  [facility]      "

## 2024-04-08 ENCOUNTER — TELEPHONE (OUTPATIENT)
Dept: PRIMARY CARE CLINIC | Facility: CLINIC | Age: 74
End: 2024-04-08
Payer: MEDICARE

## 2024-04-08 NOTE — TELEPHONE ENCOUNTER
Patient wanted to know why she had been ordered to have a renal artery us. She was told that her CT revealed narrowing of her renal vein and Dr. Barnhart was wanting her renal artery to be checked. Patient verbalized understanding.

## 2024-04-10 NOTE — PROGRESS NOTES
"Health  Wellness Visit Note    Name: Rosalia Fernández  Clinic Number: [id number]  Physician: Sayda Barnhart MD  Diagnosis: No diagnosis found.  Past Medical History:   Diagnosis Date    Colon polyps     Diverticulitis     Endometriosis     Hemorrhage     "microhemorrhage of brain"     Infertility, female     Melanoma     MVP (mitral valve prolapse)     Osteoarthritis      Visit Number: 45  Precautions: standard      1st PT visit:  5/18/23  Year of care end date:  5/18/24  Mindbody plan: Plan A - 10 months  Patient level:b    Time In: 11:00  Time Out: 12:00  Total Treatment Time: 60    [employer] 2022  Handout on this week's wellness topic of cardio provided time management along with a discussion on what it means, the benefits, and suggestions for practice.  Reviewed last week's topic of muscle strength    Subjective:   Patient reports that she has a little pain today in her back, but that has gone away from doing stretches and exercises. She states that she always has pain normally around a 2/10, but lately she has had less pain.  Pt has to use a cream to help manage her pain. States for exercise she is walking 6 days a week. She walks around 40 minutes and does her stretches after. She is also icing her back everyday around 4 pm.     Objective:   Rosalia completed therapeutic stretches (EIL, PARISA) and the following MedX exercise machines: core lumbar, torso rotation l/r, leg extension, leg curl, upright row, chest press, biceps curl, triceps extension, leg press    See exercise log in patient folder for rate of exertion and repetitions completed.       Fitness Machine Education Key:  E=education on equipment initiated and further follow up and education needed  I=independent with  and exercise.  The patient:  Adjusts machines to his/her settings  Uses equipment levers, pins, weights safely  Maintains safe and correct posture while exercising  Moves through exercise with correct pace and " control  Gets on and off equipment safely      Lumbar/Cervical Ext.  Torso Rotation  Leg Press    Leg Extension  Seated Leg Curl  Chest Press    Seated Row  Hip ADD  Hip ABD    Triceps Extension  Bicep Curl  Other:      [x] Indicates exercise has been taught for home  Lumbar/Cervical Ext. [x] Torso Rotation [x] Leg Press [x]   Leg Extension [x] Seated Leg Curl [x] Chest Press [x]   Seated Row [x] Hip ADD [x] Hip ABD [x]   Triceps Extension [x] Bicep Curl [x] Other:        Assessment:   Patient tolerated MedX Core Lumbar Strength and all other peripheral exercises without an increase in symptoms. Patient warmed up on treadmill for 8 minutes, stretched, and iced low back for 5 minutes after the workout.     Plan:  Continue with established plan of care towards wellness goals.     Health  : Constance Sykes  4/10/2024

## 2024-04-10 NOTE — PROGRESS NOTES
"Health  Wellness Visit Note    Name: Rosalia Fernández  Clinic Number: [id number]  Physician: No ref. provider found  Diagnosis: No diagnosis found.  Past Medical History:   Diagnosis Date    Colon polyps     Diverticulitis     Endometriosis     Hemorrhage     "microhemorrhage of brain"     Infertility, female     Melanoma     MVP (mitral valve prolapse)     Osteoarthritis      Visit Number: 42  Precautions: standard      1st PT visit:  5/18/23  Year of care end date:  5/18/24  Mindbody plan: Plan A - 10 months  Patient level:b    Time In: 11:00  Time Out: 12:00  Total Treatment Time: 60    [employer] 2022  Handout on this week's wellness topic of meditation  provided time management along with a discussion on what it means, the benefits, and suggestions for practice.  Reviewed last week's topic of get outdoors  Subjective:   Patient reports that she has a little pain today in her back, but that has gone away from doing stretches and exercises. Pt will see pain management today after our apt. She states that she always has pain normally around a 2/10, but lately she has had less pain.  Pt has to use a cream to help manage her pain. States for exercise she is walking 6 days a week. She walks around 40 minutes and does her stretches after. She is also icing her back everyday around 4 pm.     Objective:   Rosalia completed therapeutic stretches (EIL, PARISA) and the following MedX exercise machines: core lumbar, torso rotation l/r, leg extension, leg curl, upright row, chest press, biceps curl, triceps extension, leg press    See exercise log in patient folder for rate of exertion and repetitions completed.       Fitness Machine Education Key:  E=education on equipment initiated and further follow up and education needed  I=independent with  and exercise.  The patient:  Adjusts machines to his/her settings  Uses equipment levers, pins, weights safely  Maintains safe and correct posture while " exercising  Moves through exercise with correct pace and control  Gets on and off equipment safely      Lumbar/Cervical Ext.  Torso Rotation  Leg Press    Leg Extension  Seated Leg Curl  Chest Press    Seated Row  Hip ADD  Hip ABD    Triceps Extension  Bicep Curl  Other:      [x] Indicates exercise has been taught for home  Lumbar/Cervical Ext. [x] Torso Rotation [x] Leg Press [x]   Leg Extension [x] Seated Leg Curl [x] Chest Press [x]   Seated Row [x] Hip ADD [x] Hip ABD [x]   Triceps Extension [x] Bicep Curl [x] Other:        Assessment:   Patient tolerated MedX Core Lumbar Strength and all other peripheral exercises without an increase in symptoms. Patient warmed up on treadmill for 8 minutes, stretched, and iced low back for 5 minutes after the workout.     Plan:  Continue with established plan of care towards wellness goals.     Health  : Constance Sykes  4/10/2024

## 2024-04-12 ENCOUNTER — HOSPITAL ENCOUNTER (OUTPATIENT)
Dept: RADIOLOGY | Facility: HOSPITAL | Age: 74
Discharge: HOME | End: 2024-04-12
Attending: INTERNAL MEDICINE
Payer: MEDICARE

## 2024-04-12 ENCOUNTER — TELEPHONE (OUTPATIENT)
Dept: PRIMARY CARE CLINIC | Facility: CLINIC | Age: 74
End: 2024-04-12
Payer: MEDICARE

## 2024-04-12 DIAGNOSIS — I87.1 RENAL VEIN STENOSIS: ICD-10-CM

## 2024-04-12 DIAGNOSIS — I87.1 RENAL VEIN STENOSIS: Primary | ICD-10-CM

## 2024-04-12 PROCEDURE — 76770 US EXAM ABDO BACK WALL COMP: CPT | Mod: 26,59,, | Performed by: RADIOLOGY

## 2024-04-12 PROCEDURE — 93975 VASCULAR STUDY: CPT | Mod: TC,PO

## 2024-04-12 PROCEDURE — 93975 VASCULAR STUDY: CPT | Mod: 26,,, | Performed by: RADIOLOGY

## 2024-04-12 NOTE — TELEPHONE ENCOUNTER
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 Not sure of clinic relevance. Would discuss with a vascular surgeon .if agreeable we will place a referral. Again can be a anatomical variant but id like a vascular specialist to review

## 2024-04-12 NOTE — TELEPHONE ENCOUNTER
Patient was informed of her us results and Dr. Barnhart's recs. She verbalized understanding. She is agreeable to seeing vascular about this.

## 2024-04-12 NOTE — PROGRESS NOTES
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 Not sure of clinic relevance. Would discuss with a vascular surgeon .if agreeable we will place a referral. Again can be a anatomical variant but id like a vascular specialist to review

## 2024-04-15 ENCOUNTER — DOCUMENTATION ONLY (OUTPATIENT)
Dept: REHABILITATION | Facility: HOSPITAL | Age: 74
End: 2024-04-15
Payer: MEDICARE

## 2024-04-16 NOTE — PROGRESS NOTES
"Health  Wellness Visit Note    Name: Rosalia Fernández  Clinic Number: [id number]  Physician: No ref. provider found  Diagnosis: No diagnosis found.  Past Medical History:   Diagnosis Date    Colon polyps     Diverticulitis     Endometriosis     Hemorrhage     "microhemorrhage of brain"     Infertility, female     Melanoma     MVP (mitral valve prolapse)     Osteoarthritis      Visit Number: 48  Precautions: standard      1st PT visit:  5/18/23  Year of care end date:  5/18/24  Mindbody plan: Plan A - 10 months  Patient level:b    Time In: 11:00  Time Out: 12:00  Total Treatment Time: 60    [employer] 2022  Handout on this week's wellness topic of anti inflammatory diet  provided time management along with a discussion on what it means, the benefits, and suggestions for practice.  Reviewed last week's topic of hydration     Subjective:   Patient reports that she has a little pain today in her back, but that has gone away from doing stretches and exercises. She states that she always has pain normally around a 2/10, but lately she has had less pain.  Pt has to use a cream to help manage her pain. States for exercise she is walking 6 days a week. She walks around 40 minutes and does her stretches after. She is also icing her back everyday around 4 pm.     Objective:   Rosalia completed therapeutic stretches (EIL, PARISA) and the following MedX exercise machines: core lumbar, torso rotation l/r, leg extension, leg curl, upright row, chest press, biceps curl, triceps extension, leg press    See exercise log in patient folder for rate of exertion and repetitions completed.       Fitness Machine Education Key:  E=education on equipment initiated and further follow up and education needed  I=independent with  and exercise.  The patient:  Adjusts machines to his/her settings  Uses equipment levers, pins, weights safely  Maintains safe and correct posture while exercising  Moves through exercise with correct " pace and control  Gets on and off equipment safely      Lumbar/Cervical Ext.  Torso Rotation  Leg Press    Leg Extension  Seated Leg Curl  Chest Press    Seated Row  Hip ADD  Hip ABD    Triceps Extension  Bicep Curl  Other:      [x] Indicates exercise has been taught for home  Lumbar/Cervical Ext. [x] Torso Rotation [x] Leg Press [x]   Leg Extension [x] Seated Leg Curl [x] Chest Press [x]   Seated Row [x] Hip ADD [x] Hip ABD [x]   Triceps Extension [x] Bicep Curl [x] Other:        Assessment:   Patient tolerated MedX Core Lumbar Strength and all other peripheral exercises without an increase in symptoms. Patient warmed up on treadmill for 8 minutes, stretched, and iced low back for 5 minutes after the workout.     Plan:  Continue with established plan of care towards wellness goals.     Health  : Constance Sykes  4/16/2024

## 2024-04-18 ENCOUNTER — OFFICE VISIT (OUTPATIENT)
Dept: GASTROENTEROLOGY | Facility: CLINIC | Age: 74
End: 2024-04-18
Payer: MEDICARE

## 2024-04-18 ENCOUNTER — TELEPHONE (OUTPATIENT)
Dept: GASTROENTEROLOGY | Facility: CLINIC | Age: 74
End: 2024-04-18

## 2024-04-18 VITALS — BODY MASS INDEX: 18.56 KG/M2 | HEIGHT: 64 IN | WEIGHT: 108.69 LBS

## 2024-04-18 DIAGNOSIS — I87.1 RENAL VEIN STENOSIS: ICD-10-CM

## 2024-04-18 DIAGNOSIS — R19.8 CHANGE IN BOWEL FUNCTION: Primary | ICD-10-CM

## 2024-04-18 DIAGNOSIS — Z86.010 HISTORY OF COLON POLYPS: ICD-10-CM

## 2024-04-18 PROCEDURE — 99214 OFFICE O/P EST MOD 30 MIN: CPT | Mod: S$GLB,,, | Performed by: NURSE PRACTITIONER

## 2024-04-18 PROCEDURE — 1126F AMNT PAIN NOTED NONE PRSNT: CPT | Mod: CPTII,S$GLB,, | Performed by: NURSE PRACTITIONER

## 2024-04-18 PROCEDURE — 99999 PR PBB SHADOW E&M-EST. PATIENT-LVL IV: CPT | Mod: PBBFAC,,, | Performed by: NURSE PRACTITIONER

## 2024-04-18 PROCEDURE — 1101F PT FALLS ASSESS-DOCD LE1/YR: CPT | Mod: CPTII,S$GLB,, | Performed by: NURSE PRACTITIONER

## 2024-04-18 PROCEDURE — 3288F FALL RISK ASSESSMENT DOCD: CPT | Mod: CPTII,S$GLB,, | Performed by: NURSE PRACTITIONER

## 2024-04-18 PROCEDURE — 1160F RVW MEDS BY RX/DR IN RCRD: CPT | Mod: CPTII,S$GLB,, | Performed by: NURSE PRACTITIONER

## 2024-04-18 PROCEDURE — 3044F HG A1C LEVEL LT 7.0%: CPT | Mod: CPTII,S$GLB,, | Performed by: NURSE PRACTITIONER

## 2024-04-18 PROCEDURE — 1159F MED LIST DOCD IN RCRD: CPT | Mod: CPTII,S$GLB,, | Performed by: NURSE PRACTITIONER

## 2024-04-18 NOTE — PATIENT INSTRUCTIONS
Eating a High-Fiber Diet  Fiber is what gives strength and structure to plants. Most grains, beans, vegetables, and fruits contain fiber. Foods rich in fiber are often low in calories and fat, and they fill you up more. They may also reduce your risks for certain health problems. To find out the amount of fiber in canned, packaged, or frozen foods, read the Nutrition Facts label. It tells you how much fiber is in a serving.    Types of fiber and their benefits  There are two types of fiber: insoluble and soluble. They both aid digestion and help you maintain a healthy weight.  Insoluble fiber. This is found in whole grains, cereals, certain fruits and vegetables such as apple skin, corn, and carrots. Insoluble fiber may prevent constipation and reduce the risk for certain types of cancer.  Soluble fiber. This type of fiber is in oats, beans, and certain fruits and vegetables such as strawberries and peas. Soluble fiber can reduce cholesterol, which may help lower the risk for heart disease. It also helps control blood sugar levels.  Look for high-fiber foods  Try these foods to add fiber to your diet:  Whole-grain breads and cereals. Try to eat 6 to 8 ounces a day. Include wheat and oat bran cereals, whole-wheat muffins or toast, and corn tortillas in your meals.  Fruits. Try to eat 2 cups a day. Apples, oranges, strawberries, pears, and bananas are good sources. (Note: Fruit juice is low in fiber.)  Vegetables. Try to eat at least 2.5 cups a day. Add asparagus, carrots, broccoli, peas, and corn to your meals.  Beans. One cup of cooked lentils gives you over 15 grams of fiber. Try navy beans, lentils, and chickpeas.  Seeds. A small handful of seeds gives you about 3 grams of fiber. Try sunflower seeds.  Keep track of your fiber  Keep track of how much fiber you eat. Start by reading food labels. Then eat a variety of foods high in fiber. As you begin to eat more fiber, ask your healthcare provider how much water  you should be drinking to keep your digestive system working smoothly.  You should aim for a certain amount of fiber in your diet each day. If you are a woman, that amount is between 25 and 28 grams per day. Men should aim for 30 to 33 grams per day. After age 50, your daily fiber needs drop to 22 grams for women and 28 grams for men.  Before you reach for the fiber supplements, think about this. Fiber is found naturally in healthy whole foods. It gives you that feeling of fullness after you eat. Taking fiber supplements or eating fiber-enriched foods will not give you this full feeling.  Your fiber intake is a good measure for the quality of your overall diet. If you are missing out on your daily amount of fiber, you may be lacking other important nutrients as well.  Date Last Reviewed: 5/11/2015  © 2129-7367 [employer]. [address] [place] [address], [place] [address]. All rights reserved. This information is not intended as a substitute for professional medical care. Always follow your healthcare professional's instructions.

## 2024-04-18 NOTE — TELEPHONE ENCOUNTER
----- Message from Rosalia Barrett sent at 4/18/2024 11:03 AM CDT -----  non-specific  Pt is calling the office because she was seen this morning and set up for colonoscopy but she is unable to keep that date.  Please call back to advise   [phone]

## 2024-04-18 NOTE — PROGRESS NOTES
Subjective:       Patient ID: Rosalia Fernández is a 73 y.o. female Body mass index is 18.66 kg/m².    Chief Complaint: Change in bowel habits    This patient is new to me.  Referring Provider: Dr. Sayda Barnhart for change in bowel habits.     GI Problem  Primary symptoms do not include fever, weight loss (reports always been petite), fatigue, abdominal pain, nausea, vomiting, diarrhea, melena, hematemesis, jaundice, hematochezia or dysuria. Episode onset: change in bowel habits started several months ago of constipation and stool consistency.   The illness is also significant for constipation (bowel movements are 2-3 times a day of soft stools). The illness does not include chills, dysphagia or odynophagia. Associated symptoms comments: TREATMENT: metamucil 1-2 times a day  PAST TREATMENT: tried OTC laxative (does not remember name) helped but caused too frequent stools, imodium when she had too frequent stools, Miralax, prune juice. Associated medical issues do not include GERD.     Review of Systems   Constitutional:  Negative for appetite change, chills, fatigue, fever and weight loss (reports always been petite).        Denies change in diet; denies recent antibiotics/hospitalization, foreign travel, ill contacts, or suspect food intake   HENT:  Negative for sore throat and trouble swallowing.    Respiratory:  Negative for cough, choking and shortness of breath.    Cardiovascular:  Negative for chest pain.   Gastrointestinal:  Positive for constipation (bowel movements are 2-3 times a day of soft stools). Negative for abdominal pain, anal bleeding, blood in stool, diarrhea, dysphagia, hematemesis, hematochezia, jaundice, melena, nausea, rectal pain and vomiting.   Genitourinary:  Negative for difficulty urinating, dysuria and flank pain.   Neurological:  Negative for weakness.       No LMP recorded. Patient has had a hysterectomy.  Past Medical History:   Diagnosis Date    Colon polyps     Diverticulitis  "    Diverticulosis     Endometriosis     Hemorrhage     "microhemorrhage of brain"     Infertility, female     Melanoma     MVP (mitral valve prolapse)     Osteoarthritis      Past Surgical History:   Procedure Laterality Date    ABLATION OF MEDIAL BRANCH NERVE OF LUMBAR SPINE FACET JOINT Left 11/15/2023    Procedure: ABLATION, NERVE, FACET JOINT, LUMBAR, MEDIAL BRANCH L3/4, L4/5, L5/S1;  Surgeon: Chase Cody MD;  Location: [facility] OR;  Service: Pain Management;  Laterality: Left;    COLONOSCOPY  06/29/2021    Dr. Reid, in procedures, repeat in 5 years for surveillance    DEXA  2016    Endometriosis      HERNIA REPAIR      bladder hernia    HYSTERECTOMY      Patel, with MESH TAHBSO    INJECTION OF ANESTHETIC AGENT AROUND MEDIAL BRANCH NERVES INNERVATING LUMBAR FACET JOINT Left 04/12/2023    Procedure: Block-nerve-medial branch-lumbar L3/4,L4/5,L5/S1;  Surgeon: Chase Cody MD;  Location: [facility] OR;  Service: Pain Management;  Laterality: Left;    INJECTION OF ANESTHETIC AGENT AROUND MEDIAL BRANCH NERVES INNERVATING LUMBAR FACET JOINT Left 09/22/2023    Procedure: Block-nerve-medial branch-lumbar;  Surgeon: Chase Cody MD;  Location: [facility] OR;  Service: Pain Management;  Laterality: Left;  L3/4, L4/5, L5/S1    Melanoma Removal      OOPHORECTOMY      TOE SURGERY Left     TONSILLECTOMY       Family History   Problem Relation Name Age of Onset    Thyroid disease Mother      Alzheimer's disease Mother      Hypertension Mother      Arthritis Father      Dementia Father      Breast cancer Sister  68    Stroke Sister      Hypertension Sister      No Known Problems Brother      No Known Problems Maternal Grandmother      No Known Problems Maternal Grandfather      No Known Problems Paternal Grandmother      No Known Problems Paternal Grandfather      Colon cancer Neg Hx      Crohn's disease Neg Hx      Stomach cancer Neg Hx      Esophageal cancer Neg Hx       Social History     Tobacco Use    Smoking status: Never "    Smokeless tobacco: Never   Substance Use Topics    Alcohol use: Yes     Alcohol/week: 7.0 standard drinks of alcohol     Types: 7 Glasses of wine per week     Comment: 7 glasses of a week    Drug use: No     Wt Readings from Last 10 Encounters:   04/18/24 49.3 kg (108 lb 11 oz)   04/03/24 49.5 kg (109 lb 0.3 oz)   03/18/24 49 kg (108 lb 0.4 oz)   01/05/24 49.2 kg (108 lb 5.7 oz)   11/13/23 48.5 kg (107 lb)   11/03/23 48.6 kg (107 lb 4.1 oz)   11/02/23 49.2 kg (108 lb 9.2 oz)   10/13/23 48.5 kg (107 lb)   10/09/23 49.1 kg (108 lb 3.9 oz)   10/05/23 48.9 kg (107 lb 14.4 oz)     Lab Results   Component Value Date    WBC 5.22 03/12/2024    HGB 14.8 03/12/2024    HCT 45.3 03/12/2024    MCV 95 03/12/2024     03/12/2024     CMP  Sodium   Date Value Ref Range Status   03/12/2024 142 136 - 145 mmol/L Final   11/06/2015 141 137 - 145 MMOL/L Final     Potassium   Date Value Ref Range Status   03/12/2024 5.1 3.5 - 5.1 mmol/L Final   11/06/2015 4.1 3.5 - 5.1 MMOL/L Final     Chloride   Date Value Ref Range Status   03/12/2024 108 95 - 110 mmol/L Final   11/06/2015 101 98 - 107 MMOL/L Final     CO2   Date Value Ref Range Status   03/12/2024 28 23 - 29 mmol/L Final     Glucose   Date Value Ref Range Status   03/12/2024 104 70 - 110 mg/dL Final     BUN   Date Value Ref Range Status   03/12/2024 16 8 - 23 mg/dL Final     Creatinine   Date Value Ref Range Status   03/12/2024 0.8 0.5 - 1.4 mg/dL Final   11/06/2015 0.78 0.52 - 1.04 MG/DL Final     Calcium   Date Value Ref Range Status   03/12/2024 9.8 8.7 - 10.5 mg/dL Final     Total Protein   Date Value Ref Range Status   03/12/2024 6.7 6.0 - 8.4 g/dL Final     Albumin   Date Value Ref Range Status   03/12/2024 4.0 3.5 - 5.2 g/dL Final   11/06/2015 4.1 3.5 - 5.0 G/DL Final     Total Bilirubin   Date Value Ref Range Status   03/12/2024 0.5 0.1 - 1.0 mg/dL Final     Comment:     For infants and newborns, interpretation of results should be based  on gestational age, weight and  in agreement with clinical  observations.    Premature Infant recommended reference ranges:  Up to 24 hours.............<8.0 mg/dL  Up to 48 hours............<12.0 mg/dL  3-5 days..................<15.0 mg/dL  6-29 days.................<15.0 mg/dL       Alkaline Phosphatase   Date Value Ref Range Status   03/12/2024 68 55 - 135 U/L Final     AST   Date Value Ref Range Status   03/12/2024 19 10 - 40 U/L Final     ALT   Date Value Ref Range Status   03/12/2024 18 10 - 44 U/L Final     Anion Gap   Date Value Ref Range Status   03/12/2024 6 (L) 8 - 16 mmol/L Final     eGFR if    Date Value Ref Range Status   03/24/2022 >60 >60 mL/min/1.73 m^2 Final     eGFR if non    Date Value Ref Range Status   03/24/2022 >60 >60 mL/min/1.73 m^2 Final     Comment:     Calculation used to obtain the estimated glomerular filtration  rate (eGFR) is the CKD-EPI equation.        Lab Results   Component Value Date    LIPASE 116 07/07/2016     Lab Results   Component Value Date    TSH 1.503 03/12/2024     Reviewed prior medical records including radiology report of 4/12/2024 ultrasound doppler renal artery; 3/27/2024 CT abdomen pelvis; 1/31/2024 limited abdominal ultrasound; 4/3/2024 visit note with Dr. Barnhart; & endoscopy history (see surgical history/procedures).    Objective:      Physical Exam  Vitals and nursing note reviewed.   Constitutional:       General: She is not in acute distress.     Appearance: Normal appearance. She is well-developed and underweight. She is not diaphoretic.   HENT:      Mouth/Throat:      Lips: Pink. No lesions.      Mouth: Mucous membranes are moist. No oral lesions.      Tongue: No lesions.      Pharynx: Oropharynx is clear. No pharyngeal swelling or posterior oropharyngeal erythema.   Eyes:      General: No scleral icterus.     Conjunctiva/sclera: Conjunctivae normal.   Pulmonary:      Effort: Pulmonary effort is normal. No respiratory distress.      Breath sounds: Normal  breath sounds. No wheezing.   Abdominal:      General: Bowel sounds are normal. There is no distension or abdominal bruit.      Palpations: Abdomen is soft. Abdomen is not rigid. There is no mass.      Tenderness: There is no abdominal tenderness. There is no guarding or rebound. Negative signs include Durham's sign and McBurney's sign.   Skin:     General: Skin is warm and dry.      Coloration: Skin is not jaundiced or pale.      Findings: No erythema or rash.   Neurological:      Mental Status: She is alert and oriented to person, place, and time.   Psychiatric:         Behavior: Behavior normal.         Thought Content: Thought content normal.         Judgment: Judgment normal.         Assessment:       1. Change in bowel function    2. History of colon polyps    3. Adult BMI <19 kg/sq m    4. Renal vein stenosis        Plan:       Change in bowel function  - schedule Colonoscopy, discussed procedure with the patient, including risks and benefits, patient verbalized understanding  -     Stool Exam-Ova,Cysts,Parasites; Future; Expected date: 04/18/2024  -     Giardia / Cryptosporidum, EIA; Future; Expected date: 04/18/2024  -     Stool culture; Future; Expected date: 04/18/2024  -     Clostridium difficile EIA; Future; Expected date: 04/18/2024  - Complete pancreatic enzymes as ordered by PCP  - Recommend daily exercise as tolerated, adequate water intake (six 8-oz glasses of water daily), and high fiber diet. CONTINUE OTC fiber supplement, Metamucil (take as directed, separate from other oral medications by >2 hours).  - CONTINUE OTC PROBIOTIC AS DIRECTED ON PACKAGING  -If still no improvement with these measures, call/follow-up    History of colon polyps    Adult BMI <19 kg/sq m  - encouraged PO intake and daily calorie counts to ensure adequate nutrition is taken in, recommend at least 2,000 calories a day  - recommend nutritional drinks, such as Boost, Ensure or Glucerna, to supplement nutrition needs    Renal  vein stenosis  Recommend follow-up with Primary Care Provider & VASCULAR SURGERY for continued evaluation and management.    Follow up in about 1 month (around 5/18/2024), or if symptoms worsen or fail to improve.      If no improvement in symptoms or symptoms worsen, call/follow-up at clinic or go to ER.        40 minutes of total time spent on the encounter, which includes face to face time and non-face to face time preparing to see the patient (e.g., review of tests), Obtaining and/or reviewing separately obtained history, Documenting clinical information in the electronic or other health record, Independently interpreting results (not separately reported) and communicating results to the patient/family/caregiver, or Care coordination (not separately reported).

## 2024-04-19 ENCOUNTER — TELEPHONE (OUTPATIENT)
Dept: GASTROENTEROLOGY | Facility: CLINIC | Age: 74
End: 2024-04-19
Payer: MEDICARE

## 2024-04-19 NOTE — TELEPHONE ENCOUNTER
----- Message from Pilar Houston sent at 4/19/2024  2:06 PM CDT -----  Type:  Patient Returning Call    Who Called:Pt  Who Left Message for Patient: Yvonne  Does the patient know what this is regarding?:re-scheduling colonoscopy  Would the patient rather a call back or a response via [facility]? call  Best Call Back Number: [phone]  Additional Information:

## 2024-04-25 ENCOUNTER — INITIAL CONSULT (OUTPATIENT)
Dept: VASCULAR SURGERY | Facility: CLINIC | Age: 74
End: 2024-04-25
Payer: MEDICARE

## 2024-04-25 VITALS
BODY MASS INDEX: 18.58 KG/M2 | WEIGHT: 108.25 LBS | SYSTOLIC BLOOD PRESSURE: 118 MMHG | HEART RATE: 73 BPM | DIASTOLIC BLOOD PRESSURE: 70 MMHG

## 2024-04-25 DIAGNOSIS — I87.1 RENAL VEIN STENOSIS: ICD-10-CM

## 2024-04-25 PROCEDURE — 99999 PR PBB SHADOW E&M-EST. PATIENT-LVL III: CPT | Mod: PBBFAC,,, | Performed by: STUDENT IN AN ORGANIZED HEALTH CARE EDUCATION/TRAINING PROGRAM

## 2024-04-25 PROCEDURE — 99214 OFFICE O/P EST MOD 30 MIN: CPT | Mod: S$GLB,,, | Performed by: STUDENT IN AN ORGANIZED HEALTH CARE EDUCATION/TRAINING PROGRAM

## 2024-04-25 NOTE — PROGRESS NOTES
"[facility]  [facility]  History & Physical    SUBJECTIVE:     Chief Complaint:  Imaging findings of nutcracker syndrome      History of Present Illness:  Rosalia Fernández is a 73 y.o. female presents with history of diverticulitis, melanoma, and osteoarthritis.  She was currently undergoing workup for soft/loose stools.  She has had an abdominal and renal ultrasound as well as abdominal CT scans.  There was report of left renal vein compression suggestive of nutcracker syndrome.    She denies any pelvic or lower back pain or thigh or buttock varicosities, or any symptoms consistent with pelvic congestion syndrome.      Review of patient's allergies indicates:   Allergen Reactions    Aspirin Other (See Comments)     Risk of brain bleed    Celebrex [celecoxib]      Fuzzy feeling    Prednisone Other (See Comments)     Blurry vision, not sure if dose was too much for her.    Tramadol      Fuzzy feeling       Past Medical History:   Diagnosis Date    Colon polyps     Diverticulitis     Diverticulosis     Endometriosis     Hemorrhage     "microhemorrhage of brain"     Infertility, female     Melanoma     MVP (mitral valve prolapse)     Osteoarthritis      Past Surgical History:   Procedure Laterality Date    ABLATION OF MEDIAL BRANCH NERVE OF LUMBAR SPINE FACET JOINT Left 11/15/2023    Procedure: ABLATION, NERVE, FACET JOINT, LUMBAR, MEDIAL BRANCH L3/4, L4/5, L5/S1;  Surgeon: Chase Cody MD;  Location: [facility] OR;  Service: Pain Management;  Laterality: Left;    COLONOSCOPY  06/29/2021    Dr. Reid, in procedures, repeat in 5 years for surveillance    DEXA  2016    Endometriosis      HERNIA REPAIR      bladder hernia    HYSTERECTOMY      Patel, with MESH TAHBSO    INJECTION OF ANESTHETIC AGENT AROUND MEDIAL BRANCH NERVES INNERVATING LUMBAR FACET JOINT Left 04/12/2023    Procedure: Block-nerve-medial branch-lumbar L3/4,L4/5,L5/S1;  Surgeon: Chase Cody MD;  Location: [facility] OR; "  Service: Pain Management;  Laterality: Left;    INJECTION OF ANESTHETIC AGENT AROUND MEDIAL BRANCH NERVES INNERVATING LUMBAR FACET JOINT Left 09/22/2023    Procedure: Block-nerve-medial branch-lumbar;  Surgeon: Chase Cody MD;  Location: [facility];  Service: Pain Management;  Laterality: Left;  L3/4, L4/5, L5/S1    Melanoma Removal      OOPHORECTOMY      TOE SURGERY Left     TONSILLECTOMY       Family History   Problem Relation Name Age of Onset    Thyroid disease Mother      Alzheimer's disease Mother      Hypertension Mother      Arthritis Father      Dementia Father      Breast cancer Sister  68    Stroke Sister      Hypertension Sister      No Known Problems Brother      No Known Problems Maternal Grandmother      No Known Problems Maternal Grandfather      No Known Problems Paternal Grandmother      No Known Problems Paternal Grandfather      Colon cancer Neg Hx      Crohn's disease Neg Hx      Stomach cancer Neg Hx      Esophageal cancer Neg Hx       Social History     Tobacco Use    Smoking status: Never    Smokeless tobacco: Never   Substance Use Topics    Alcohol use: Yes     Alcohol/week: 7.0 standard drinks of alcohol     Types: 7 Glasses of wine per week     Comment: 7 glasses of a week    Drug use: No        Review of Systems:  Review of Systems   All other systems reviewed and are negative.      OBJECTIVE:     Vital Signs (Most Recent):  Pulse: 73 (04/25/24 0851)  BP: 118/70 (04/25/24 0851)    Physical Exam:  Physical Exam   Constitutional: She is oriented to person, place, and time.  Non-toxic appearance. No distress.   Cardiovascular: Normal rate and normal pulses. Pulmonary:      Effort: Pulmonary effort is normal. No respiratory distress.      Breath sounds: No wheezing.     Abdominal: Soft. She exhibits no distension. There is no abdominal tenderness.   Musculoskeletal:      Right lower leg: No edema.      Left lower leg: No edema.   Neurological: She is alert and oriented to person, place,  and time.   Skin: Skin is warm and dry. Capillary refill takes less than 2 seconds.   Psychiatric: Her behavior is normal. Mood and thought content normal.   Vitals reviewed.      Laboratory:  Lab Results   Component Value Date    WBC 5.22 03/12/2024    HGB 14.8 03/12/2024    HCT 45.3 03/12/2024     03/12/2024    CHOL 202 (H) 03/12/2024    TRIG 82 03/12/2024    HDL 61 03/12/2024    ALT 18 03/12/2024    AST 19 03/12/2024     03/12/2024    K 5.1 03/12/2024     03/12/2024    CREATININE 0.8 03/12/2024    BUN 16 03/12/2024    CO2 28 03/12/2024    TSH 1.503 03/12/2024    HGBA1C 5.4 03/12/2024         Diagnostic Results:  Impression:     1. Sonographic findings which could produce symptoms of anterior nutcracker syndrome with reduced SMA-aortic angle of 7° and apparent compression of the left renal vein as it crosses between the proximal SMA and abdominal aorta.  Please correlate clinically.  2. No sonographic evidence of hemodynamically significant renal artery stenosis  3. Non-obstructing right nephrolithiasis.  This report was flagged in Epic as abnormal.      ASSESSMENT/PLAN:     73-year-old female presents with imaging findings consistent with nutcracker syndrome.    I did personally visualize and review her ultrasound and CT scan.  There is some slight left renal vein compression.  I do not visualize any notable pelvic varicosities on the CT scan.  I think this imaging finding is of no consequence considering she has no clinical symptoms consistent with nutcracker syndrome despite the imaging finding.  She denies any pelvic pain or back pain    I do not think there is any intervention needed or any additional imaging is warranted.    I do appreciate the consult.    Renal vein stenosis  -     Ambulatory referral/consult to Vascular Surgery          Davis Sheppard M.D.   [facility]

## 2024-04-26 ENCOUNTER — DOCUMENTATION ONLY (OUTPATIENT)
Dept: REHABILITATION | Facility: HOSPITAL | Age: 74
End: 2024-04-26
Payer: MEDICARE

## 2024-04-26 NOTE — PROGRESS NOTES
"Health  Wellness Visit Note    Name: Rosalia Fernández  Clinic Number: [id number]  Physician: No ref. provider found  Diagnosis: No diagnosis found.  Past Medical History:   Diagnosis Date    Colon polyps     Diverticulitis     Diverticulosis     Endometriosis     Hemorrhage     "microhemorrhage of brain"     Infertility, female     Melanoma     MVP (mitral valve prolapse)     Osteoarthritis      Visit Number: 49  Precautions: standard      1st PT visit:  6/18/23  Year of care end date:  6/18/24  Mindbody plan: Plan A - 10 months  Patient level:b    Time In: 11:00  Time Out: 12:00  Total Treatment Time: 60    [employer]  Handout on this week's wellness topic of anti inflammatory diet  provided time management along with a discussion on what it means, the benefits, and suggestions for practice.  Reviewed last week's topic of hydration     Subjective:   Patient reports that she has a little pain today in her back, but that has gone away from doing stretches and exercises. She states that she always has pain normally around a 2/10, but lately she has had less pain.  Pt has to use a cream to help manage her pain. States for exercise she is walking 6 days a week. She walks around 40 minutes and does her stretches after. She is also icing her back everyday around 4 pm.     Objective:   Rosalia completed therapeutic stretches (EIL, PARISA) and the following MedX exercise machines: core lumbar, torso rotation l/r, leg extension, leg curl, upright row, chest press, biceps curl, triceps extension, leg press    See exercise log in patient folder for rate of exertion and repetitions completed.       Fitness Machine Education Key:  E=education on equipment initiated and further follow up and education needed  I=independent with  and exercise.  The patient:  Adjusts machines to his/her settings  Uses equipment levers, pins, weights safely  Maintains safe and correct posture while exercising  Moves through " exercise with correct pace and control  Gets on and off equipment safely      Lumbar/Cervical Ext.  Torso Rotation  Leg Press    Leg Extension  Seated Leg Curl  Chest Press    Seated Row  Hip ADD  Hip ABD    Triceps Extension  Bicep Curl  Other:      [x] Indicates exercise has been taught for home  Lumbar/Cervical Ext. [x] Torso Rotation [x] Leg Press [x]   Leg Extension [x] Seated Leg Curl [x] Chest Press [x]   Seated Row [x] Hip ADD [x] Hip ABD [x]   Triceps Extension [x] Bicep Curl [x] Other:        Assessment:   Patient tolerated MedX Core Lumbar Strength and all other peripheral exercises without an increase in symptoms. Patient warmed up on treadmill for 8 minutes, stretched, and iced low back for 5 minutes after the workout.     Plan:  Continue with established plan of care towards wellness goals.     Health  : Constance Sykes  4/26/2024

## 2024-04-29 ENCOUNTER — LAB VISIT (OUTPATIENT)
Dept: LAB | Facility: HOSPITAL | Age: 74
End: 2024-04-29
Attending: NURSE PRACTITIONER
Payer: MEDICARE

## 2024-04-29 DIAGNOSIS — R19.8 CHANGE IN BOWEL FUNCTION: ICD-10-CM

## 2024-04-29 PROCEDURE — 87046 STOOL CULTR AEROBIC BACT EA: CPT | Performed by: NURSE PRACTITIONER

## 2024-04-29 PROCEDURE — 87045 FECES CULTURE AEROBIC BACT: CPT | Performed by: NURSE PRACTITIONER

## 2024-04-29 PROCEDURE — 87449 NOS EACH ORGANISM AG IA: CPT | Performed by: NURSE PRACTITIONER

## 2024-04-29 PROCEDURE — 87329 GIARDIA AG IA: CPT | Performed by: NURSE PRACTITIONER

## 2024-04-29 PROCEDURE — 87209 SMEAR COMPLEX STAIN: CPT | Performed by: NURSE PRACTITIONER

## 2024-04-29 PROCEDURE — 87427 SHIGA-LIKE TOXIN AG IA: CPT | Mod: 59 | Performed by: NURSE PRACTITIONER

## 2024-04-30 ENCOUNTER — TELEPHONE (OUTPATIENT)
Dept: GASTROENTEROLOGY | Facility: CLINIC | Age: 74
End: 2024-04-30
Payer: MEDICARE

## 2024-04-30 LAB
CRYPTOSP AG STL QL IA: NEGATIVE
E COLI SXT1 STL QL IA: NEGATIVE
E COLI SXT2 STL QL IA: NEGATIVE
G LAMBLIA AG STL QL IA: NEGATIVE

## 2024-04-30 NOTE — TELEPHONE ENCOUNTER
----- Message from Latonia Nagy sent at 4/30/2024  4:05 AM CDT -----  Regarding: Lab Client Services  Contact: [phone]  Good Morning,     My name is Latonia Nagy, I work in the lab. We received a specimen for Clostridium Difficile test. The test was unable to be performed due to the stool sample was formed stool. If you have any questions regarding this, please contact Lab Client Services at [phone].      Thank you,

## 2024-05-01 LAB — BACTERIA STL CULT: NORMAL

## 2024-05-02 ENCOUNTER — TELEPHONE (OUTPATIENT)
Dept: GASTROENTEROLOGY | Facility: CLINIC | Age: 74
End: 2024-05-02
Payer: MEDICARE

## 2024-05-02 LAB — O+P STL MICRO: NORMAL

## 2024-05-02 NOTE — TELEPHONE ENCOUNTER
"Please call to inform & review the results with the patient- stool studies received back showed negative/unremarkable findings.  "Comment: C difficile Toxin, Antigen was cancelled on 04/29/2024 at 23:03 by NM3; Formed stool specimen received for testing. Testing is only performed on liquid/semi-formed specimens." If diarrhea persist, recommend completing C. Diff stool study.    Continue with previous recommendations. If no improvement in symptoms or symptoms worsen, call/follow-up at clinic or go to ER.    Thanks,      "

## 2024-05-03 ENCOUNTER — DOCUMENTATION ONLY (OUTPATIENT)
Dept: REHABILITATION | Facility: HOSPITAL | Age: 74
End: 2024-05-03
Payer: MEDICARE

## 2024-05-03 NOTE — PROGRESS NOTES
"Health  Wellness Visit Note    Name: Rosalia Fernández  Clinic Number: [id number]  Physician: No ref. provider found  Diagnosis: No diagnosis found.  Past Medical History:   Diagnosis Date    Colon polyps     Diverticulitis     Diverticulosis     Endometriosis     Hemorrhage     "microhemorrhage of brain"     Infertility, female     Melanoma     MVP (mitral valve prolapse)     Osteoarthritis      Visit Number: 50  Precautions: standard      1st PT visit:  6/18/23  Year of care end date:  6/18/24  Mindbody plan: Plan A - 10 months  Patient level:b    Time In: 11:00  Time Out: 12:00  Total Treatment Time: 60    [employer]  Handout on this week's wellness topic of breathing exercises provided time management along with a discussion on what it means, the benefits, and suggestions for practice.  Reviewed last week's topic of hydration     Subjective:   Patient reports that she has a little pain today in her back, but that has gone away from doing stretches and exercises. She states that she always has pain normally around a 2/10, but lately she has had less pain.  Pt has to use a cream to help manage her pain. States for exercise she is walking 6 days a week. She walks around 40 minutes and does her stretches after. She is also icing her back everyday around 4 pm.     Objective:   Rosalia completed therapeutic stretches (EIL, PARISA) and the following MedX exercise machines: core lumbar, torso rotation l/r, leg extension, leg curl, upright row, chest press, biceps curl, triceps extension, leg press    See exercise log in patient folder for rate of exertion and repetitions completed.       Fitness Machine Education Key:  E=education on equipment initiated and further follow up and education needed  I=independent with  and exercise.  The patient:  Adjusts machines to his/her settings  Uses equipment levers, pins, weights safely  Maintains safe and correct posture while exercising  Moves through " exercise with correct pace and control  Gets on and off equipment safely      Lumbar/Cervical Ext.  Torso Rotation  Leg Press    Leg Extension  Seated Leg Curl  Chest Press    Seated Row  Hip ADD  Hip ABD    Triceps Extension  Bicep Curl  Other:      [x] Indicates exercise has been taught for home  Lumbar/Cervical Ext. [x] Torso Rotation [x] Leg Press [x]   Leg Extension [x] Seated Leg Curl [x] Chest Press [x]   Seated Row [x] Hip ADD [x] Hip ABD [x]   Triceps Extension [x] Bicep Curl [x] Other:        Assessment:   Patient tolerated MedX Core Lumbar Strength and all other peripheral exercises without an increase in symptoms. Patient warmed up on treadmill for 8 minutes, stretched, and iced low back for 5 minutes after the workout.     Plan:  Continue with established plan of care towards wellness goals.     Health  : Constance Sykes  5/3/2024

## 2024-05-06 ENCOUNTER — TELEPHONE (OUTPATIENT)
Dept: GASTROENTEROLOGY | Facility: CLINIC | Age: 74
End: 2024-05-06
Payer: MEDICARE

## 2024-05-06 PROBLEM — G89.29 CHRONIC RIGHT SHOULDER PAIN: Status: RESOLVED | Noted: 2023-11-28 | Resolved: 2023-11-28

## 2024-05-06 PROBLEM — R29.898 WEAKNESS OF SHOULDER: Status: RESOLVED | Noted: 2023-11-28 | Resolved: 2023-11-28

## 2024-05-06 PROBLEM — M25.511 CHRONIC RIGHT SHOULDER PAIN: Status: RESOLVED | Noted: 2023-11-28 | Resolved: 2023-11-28

## 2024-05-06 NOTE — TELEPHONE ENCOUNTER
----- Message from Jono Valentine sent at 5/6/2024 12:19 PM CDT -----  Type: Needs Medical Advice    Who Called:  Pt    Best Call Back Number: [phone]    Additional Information: Pt wanted to get sooner miguel for procedure on 6/27, states she's having issues with colon now.  Also, wanted to check if he takes her insurance . Please call back to advise, Thanks!

## 2024-05-08 ENCOUNTER — DOCUMENTATION ONLY (OUTPATIENT)
Dept: REHABILITATION | Facility: HOSPITAL | Age: 74
End: 2024-05-08
Payer: MEDICARE

## 2024-05-08 NOTE — PROGRESS NOTES
"Health  Wellness Visit Note    Name: Rosalia Fernández  Clinic Number: [id number]  Physician: No ref. provider found  Diagnosis: No diagnosis found.  Past Medical History:   Diagnosis Date    Colon polyps     Diverticulitis     Diverticulosis     Endometriosis     Hemorrhage     "microhemorrhage of brain"     Infertility, female     Melanoma     MVP (mitral valve prolapse)     Osteoarthritis      Visit Number: 51  Precautions: standard      1st PT visit:  6/18/23  Year of care end date:  6/18/24  Mindbody plan: Plan A - 10 months  Patient level:b    Time In: 11:00  Time Out: 12:00  Total Treatment Time: 60    [employer] 2022  Handout on this week's wellness topic of breathing exercises provided time management along with a discussion on what it means, the benefits, and suggestions for practice.  Reviewed last week's topic of hydration     Subjective:   Patient reports that she has a little pain today in her back, but that has gone away from doing stretches and exercises. She states that she always has pain normally around a 2/10, but lately she has had less pain.  Pt has to use a cream to help manage her pain. States for exercise she is walking 6 days a week. She walks around 40 minutes and does her stretches after. She is also icing her back everyday around 4 pm.     Objective:   Rosalia completed therapeutic stretches (EIL, PARISA) and the following MedX exercise machines: core lumbar, torso rotation l/r, leg extension, leg curl, upright row, chest press, biceps curl, triceps extension, leg press    See exercise log in patient folder for rate of exertion and repetitions completed.       Fitness Machine Education Key:  E=education on equipment initiated and further follow up and education needed  I=independent with  and exercise.  The patient:  Adjusts machines to his/her settings  Uses equipment levers, pins, weights safely  Maintains safe and correct posture while exercising  Moves through " exercise with correct pace and control  Gets on and off equipment safely      Lumbar/Cervical Ext.  Torso Rotation  Leg Press    Leg Extension  Seated Leg Curl  Chest Press    Seated Row  Hip ADD  Hip ABD    Triceps Extension  Bicep Curl  Other:      [x] Indicates exercise has been taught for home  Lumbar/Cervical Ext. [x] Torso Rotation [x] Leg Press [x]   Leg Extension [x] Seated Leg Curl [x] Chest Press [x]   Seated Row [x] Hip ADD [x] Hip ABD [x]   Triceps Extension [x] Bicep Curl [x] Other:        Assessment:   Patient tolerated MedX Core Lumbar Strength and all other peripheral exercises without an increase in symptoms. Patient warmed up on treadmill for 8 minutes, stretched, and iced low back for 5 minutes after the workout.     Plan:  Continue with established plan of care towards wellness goals.     Health  : Constance Sykes  5/8/2024

## 2024-05-17 ENCOUNTER — DOCUMENTATION ONLY (OUTPATIENT)
Dept: REHABILITATION | Facility: HOSPITAL | Age: 74
End: 2024-05-17
Payer: MEDICARE

## 2024-05-17 NOTE — PROGRESS NOTES
"Health  Wellness Visit Note    Name: Rosalia Fernández  Clinic Number: [id number]  Physician: No ref. provider found  Diagnosis: No diagnosis found.  Past Medical History:   Diagnosis Date    Colon polyps     Diverticulitis     Diverticulosis     Endometriosis     Hemorrhage     "microhemorrhage of brain"     Infertility, female     Melanoma     MVP (mitral valve prolapse)     Osteoarthritis      Visit Number: 52  Precautions: standard      1st PT visit:  6/18/23  Year of care end date:  6/18/24  Mindbody plan: Plan A - 10 months  Patient level:b    Time In: 11:00  Time Out: 12:00  Total Treatment Time: 60    [employer] 2022  Handout on this week's wellness topic of learning  provided time management along with a discussion on what it means, the benefits, and suggestions for practice.  Reviewed last week's topic of breathing exercises     Subjective:   Patient reports that she has a little pain today in her back, but that has gone away from doing stretches and exercises. She states that she always has pain normally around a 2/10, but lately she has had less pain.  Pt has to use a cream to help manage her pain. States for exercise she is walking 6 days a week. She walks around 40 minutes and does her stretches after. She is also icing her back everyday around 4 pm.     Objective:   Rosalia completed therapeutic stretches (EIL, PARISA) and the following MedX exercise machines: core lumbar, torso rotation l/r, leg extension, leg curl, upright row, chest press, biceps curl, triceps extension, leg press    See exercise log in patient folder for rate of exertion and repetitions completed.       Fitness Machine Education Key:  E=education on equipment initiated and further follow up and education needed  I=independent with  and exercise.  The patient:  Adjusts machines to his/her settings  Uses equipment levers, pins, weights safely  Maintains safe and correct posture while exercising  Moves through " exercise with correct pace and control  Gets on and off equipment safely      Lumbar/Cervical Ext.  Torso Rotation  Leg Press    Leg Extension  Seated Leg Curl  Chest Press    Seated Row  Hip ADD  Hip ABD    Triceps Extension  Bicep Curl  Other:      [x] Indicates exercise has been taught for home  Lumbar/Cervical Ext. [x] Torso Rotation [x] Leg Press [x]   Leg Extension [x] Seated Leg Curl [x] Chest Press [x]   Seated Row [x] Hip ADD [x] Hip ABD [x]   Triceps Extension [x] Bicep Curl [x] Other:        Assessment:   Patient tolerated MedX Core Lumbar Strength and all other peripheral exercises without an increase in symptoms. Patient warmed up on treadmill for 8 minutes, stretched, and iced low back for 5 minutes after the workout.     Plan:  Continue with established plan of care towards wellness goals.     Health  : Constance Sykes  5/17/2024

## 2024-05-20 ENCOUNTER — DOCUMENTATION ONLY (OUTPATIENT)
Dept: REHABILITATION | Facility: HOSPITAL | Age: 74
End: 2024-05-20
Payer: MEDICARE

## 2024-05-20 NOTE — PROGRESS NOTES
"Health  Wellness Visit Note    Name: Rosalia Fernández  Clinic Number: [id number]  Physician: No ref. provider found  Diagnosis: No diagnosis found.  Past Medical History:   Diagnosis Date    Colon polyps     Diverticulitis     Diverticulosis     Endometriosis     Hemorrhage     "microhemorrhage of brain"     Infertility, female     Melanoma     MVP (mitral valve prolapse)     Osteoarthritis      Visit Number: 53  Precautions: standard      1st PT visit:  6/18/23  Year of care end date:  6/18/24  Mindbody plan: Plan A - 10 months  Patient level:b    Time In: 11:00  Time Out: 12:00  Total Treatment Time: 60    [employer] 2022  Handout on this week's wellness topic of positive thinking provided time management along with a discussion on what it means, the benefits, and suggestions for practice.  Reviewed last week's topic of learning     Subjective:   Patient reports that she has a little pain today in her back, but that has gone away from doing stretches and exercises. She states that she always has pain normally around a 2/10, but lately she has had less pain.  Pt has to use a cream to help manage her pain. States for exercise she is walking 6 days a week. She walks around 40 minutes and does her stretches after. She is also icing her back everyday around 4 pm.     Objective:   Rosalia completed therapeutic stretches (EIL, PARISA) and the following MedX exercise machines: core lumbar, torso rotation l/r, leg extension, leg curl, upright row, chest press, biceps curl, triceps extension, leg press    See exercise log in patient folder for rate of exertion and repetitions completed.       Fitness Machine Education Key:  E=education on equipment initiated and further follow up and education needed  I=independent with  and exercise.  The patient:  Adjusts machines to his/her settings  Uses equipment levers, pins, weights safely  Maintains safe and correct posture while exercising  Moves through exercise " with correct pace and control  Gets on and off equipment safely      Lumbar/Cervical Ext.  Torso Rotation  Leg Press    Leg Extension  Seated Leg Curl  Chest Press    Seated Row  Hip ADD  Hip ABD    Triceps Extension  Bicep Curl  Other:      [x] Indicates exercise has been taught for home  Lumbar/Cervical Ext. [x] Torso Rotation [x] Leg Press [x]   Leg Extension [x] Seated Leg Curl [x] Chest Press [x]   Seated Row [x] Hip ADD [x] Hip ABD [x]   Triceps Extension [x] Bicep Curl [x] Other:        Assessment:   Patient tolerated MedX Core Lumbar Strength and all other peripheral exercises without an increase in symptoms. Patient warmed up on treadmill for 8 minutes, stretched, and iced low back for 5 minutes after the workout.     Plan:  Continue with established plan of care towards wellness goals.     Health  : Constance Sykes  5/20/2024

## 2024-05-27 ENCOUNTER — DOCUMENTATION ONLY (OUTPATIENT)
Dept: REHABILITATION | Facility: HOSPITAL | Age: 74
End: 2024-05-27
Payer: MEDICARE

## 2024-05-27 NOTE — PROGRESS NOTES
"Health  Wellness Visit Note    Name: Rosalia Fernández  Clinic Number: [id number]  Physician: No ref. provider found  Diagnosis: No diagnosis found.  Past Medical History:   Diagnosis Date    Colon polyps     Diverticulitis     Diverticulosis     Endometriosis     Hemorrhage     "microhemorrhage of brain"     Infertility, female     Melanoma     MVP (mitral valve prolapse)     Osteoarthritis      Visit Number: 53  Precautions: standard      1st PT visit:  6/18/23  Year of care end date:  6/18/24  Mindbody plan: Plan A - 10 months  Patient level:b    Time In: 11:00 AM  Time Out: 11:56 AM  Total Treatment Time: 56 minutes    Well[employer] 2022  Handout on this week's wellness topic of  Guilt and shame provided along with a discussion on what it means, the benefits, and suggestions for practice.  Reviewed last week's topic of positive thinking     Subjective:   Patient reports no pain in her back, but that has gone away from doing stretches and exercises. Patient was out of town last week so she wasn't able to keep up with her normal routine. Patient usually walks for 6 days a week. She walks around 40 minutes and does her stretches after. She is also icing her back everyday around 4 pm.     Objective:   Rosalia completed therapeutic stretches (EIL, PARIAS) and the following MedX exercise machines: core lumbar, torso rotation l/r, leg extension, leg curl, upright row, chest press, biceps curl, triceps extension, leg press    See exercise log in patient folder for rate of exertion and repetitions completed.       Fitness Machine Education Key:  E=education on equipment initiated and further follow up and education needed  I=independent with  and exercise.  The patient:  Adjusts machines to his/her settings  Uses equipment levers, pins, weights safely  Maintains safe and correct posture while exercising  Moves through exercise with correct pace and control  Gets on and off equipment " safely      Lumbar/Cervical Ext.  Torso Rotation  Leg Press    Leg Extension  Seated Leg Curl  Chest Press    Seated Row  Hip ADD  Hip ABD    Triceps Extension  Bicep Curl  Other:      [x] Indicates exercise has been taught for home  Lumbar/Cervical Ext. [x] Torso Rotation [x] Leg Press [x]   Leg Extension [x] Seated Leg Curl [x] Chest Press [x]   Seated Row [x] Hip ADD [x] Hip ABD [x]   Triceps Extension [x] Bicep Curl [x] Other:        Assessment:   Patient tolerated MedX Core Lumbar Strength and all other peripheral exercises without an increase in symptoms. Patient warmed up on treadmill for 8 minutes, stretched, and iced low back for 5 minutes after the workout.     Plan:  Continue with established plan of care towards wellness goals.     Health  : Jovanna Zacarias  5/27/2024

## 2024-05-28 ENCOUNTER — LAB VISIT (OUTPATIENT)
Dept: LAB | Facility: HOSPITAL | Age: 74
End: 2024-05-28
Attending: INTERNAL MEDICINE
Payer: MEDICARE

## 2024-05-28 DIAGNOSIS — R53.83 FATIGUE, UNSPECIFIED TYPE: ICD-10-CM

## 2024-05-28 DIAGNOSIS — E78.2 MIXED HYPERLIPIDEMIA: ICD-10-CM

## 2024-05-28 DIAGNOSIS — K86.9 ENLARGED PANCREAS: ICD-10-CM

## 2024-05-28 LAB
ALBUMIN SERPL BCP-MCNC: 3.9 G/DL (ref 3.5–5.2)
ALP SERPL-CCNC: 71 U/L (ref 55–135)
ALT SERPL W/O P-5'-P-CCNC: 24 U/L (ref 10–44)
AMYLASE SERPL-CCNC: 77 U/L (ref 20–110)
ANION GAP SERPL CALC-SCNC: 4 MMOL/L (ref 8–16)
AST SERPL-CCNC: 22 U/L (ref 10–40)
BILIRUB SERPL-MCNC: 0.6 MG/DL (ref 0.1–1)
BUN SERPL-MCNC: 14 MG/DL (ref 8–23)
CALCIUM SERPL-MCNC: 9.6 MG/DL (ref 8.7–10.5)
CHLORIDE SERPL-SCNC: 107 MMOL/L (ref 95–110)
CHOLEST SERPL-MCNC: 153 MG/DL (ref 120–199)
CHOLEST/HDLC SERPL: 2.6 {RATIO} (ref 2–5)
CO2 SERPL-SCNC: 30 MMOL/L (ref 23–29)
CREAT SERPL-MCNC: 0.9 MG/DL (ref 0.5–1.4)
EST. GFR  (NO RACE VARIABLE): >60 ML/MIN/1.73 M^2
GLUCOSE SERPL-MCNC: 103 MG/DL (ref 70–110)
HDLC SERPL-MCNC: 60 MG/DL (ref 40–75)
HDLC SERPL: 39.2 % (ref 20–50)
LDLC SERPL CALC-MCNC: 73.4 MG/DL (ref 63–159)
LIPASE SERPL-CCNC: 26 U/L (ref 4–60)
NONHDLC SERPL-MCNC: 93 MG/DL
POTASSIUM SERPL-SCNC: 5 MMOL/L (ref 3.5–5.1)
PROT SERPL-MCNC: 6.7 G/DL (ref 6–8.4)
SODIUM SERPL-SCNC: 141 MMOL/L (ref 136–145)
TRIGL SERPL-MCNC: 98 MG/DL (ref 30–150)

## 2024-05-28 PROCEDURE — 36415 COLL VENOUS BLD VENIPUNCTURE: CPT | Mod: PO | Performed by: INTERNAL MEDICINE

## 2024-05-28 PROCEDURE — 83690 ASSAY OF LIPASE: CPT | Performed by: INTERNAL MEDICINE

## 2024-05-28 PROCEDURE — 82150 ASSAY OF AMYLASE: CPT | Performed by: INTERNAL MEDICINE

## 2024-05-28 PROCEDURE — 80061 LIPID PANEL: CPT | Performed by: INTERNAL MEDICINE

## 2024-05-28 PROCEDURE — 80053 COMPREHEN METABOLIC PANEL: CPT | Performed by: INTERNAL MEDICINE

## 2024-06-03 ENCOUNTER — DOCUMENTATION ONLY (OUTPATIENT)
Dept: REHABILITATION | Facility: HOSPITAL | Age: 74
End: 2024-06-03
Payer: MEDICARE

## 2024-06-03 NOTE — PROGRESS NOTES
"Health  Wellness Visit Note    Name: Rosalia Fernández  Clinic Number: [id number]  Physician: No ref. provider found  Diagnosis: No diagnosis found.  Past Medical History:   Diagnosis Date    Colon polyps     Diverticulitis     Diverticulosis     Endometriosis     Hemorrhage     "microhemorrhage of brain"     Infertility, female     Melanoma     MVP (mitral valve prolapse)     Osteoarthritis      Visit Number: 54  Precautions: standard      1st PT visit:  6/18/23  Year of care end date:  6/18/24  Mindbody plan: Plan A - 10 months  Patient level:b    Time In: 9:25 AM  Time Out: 10:30 AM  Total Treatment Time: 55 minutes    [employer] 2022  Handout on this week's wellness topic of Anxiety provided along with a discussion on what it means, the benefits, and suggestions for practice.  Reviewed last week's topic of Guilt and shame      Subjective:   Patient reports no complaints. Patient usually walks for 6 days a week. She walks around 40 minutes and does her stretches after. Patient plans on going to water aerobics. She did not use any ice this week.      Objective:   Rosalia completed therapeutic stretches (EIL, PARISA) and the following MedX exercise machines: core lumbar, torso rotation l/r, leg extension, leg curl, upright row, chest press, biceps curl, triceps extension, leg press    See exercise log in patient folder for rate of exertion and repetitions completed.       Fitness Machine Education Key:  E=education on equipment initiated and further follow up and education needed  I=independent with  and exercise.  The patient:  Adjusts machines to his/her settings  Uses equipment levers, pins, weights safely  Maintains safe and correct posture while exercising  Moves through exercise with correct pace and control  Gets on and off equipment safely      Lumbar/Cervical Ext.  Torso Rotation  Leg Press    Leg Extension  Seated Leg Curl  Chest Press    Seated Row  Hip ADD  Hip ABD    Triceps Extension  " Bicep Curl  Other:      [x] Indicates exercise has been taught for home  Lumbar/Cervical Ext. [x] Torso Rotation [x] Leg Press [x]   Leg Extension [x] Seated Leg Curl [x] Chest Press [x]   Seated Row [x] Hip ADD [x] Hip ABD [x]   Triceps Extension [x] Bicep Curl [x] Other:        Assessment:   Patient tolerated MedX Core Lumbar Strength and all other peripheral exercises without an increase in symptoms. Patient warmed up on treadmill for 8 minutes, stretched, and iced low back for 5 minutes after the workout.     Plan:  Continue with established plan of care towards wellness goals.     Health  : Jovanna Zacarias  6/3/2024

## 2024-06-05 ENCOUNTER — OFFICE VISIT (OUTPATIENT)
Dept: PRIMARY CARE CLINIC | Facility: CLINIC | Age: 74
End: 2024-06-05
Payer: MEDICARE

## 2024-06-05 ENCOUNTER — TELEPHONE (OUTPATIENT)
Dept: PRIMARY CARE CLINIC | Facility: CLINIC | Age: 74
End: 2024-06-05

## 2024-06-05 VITALS
TEMPERATURE: 98 F | OXYGEN SATURATION: 98 % | WEIGHT: 107.69 LBS | RESPIRATION RATE: 17 BRPM | HEIGHT: 64 IN | SYSTOLIC BLOOD PRESSURE: 122 MMHG | DIASTOLIC BLOOD PRESSURE: 74 MMHG | BODY MASS INDEX: 18.39 KG/M2 | HEART RATE: 79 BPM

## 2024-06-05 DIAGNOSIS — E78.2 MIXED HYPERLIPIDEMIA: Primary | ICD-10-CM

## 2024-06-05 DIAGNOSIS — H90.3 SENSORINEURAL HEARING LOSS (SNHL) OF BOTH EARS: ICD-10-CM

## 2024-06-05 DIAGNOSIS — H81.11 BENIGN PAROXYSMAL POSITIONAL VERTIGO OF RIGHT EAR: ICD-10-CM

## 2024-06-05 DIAGNOSIS — R73.03 PREDIABETES: ICD-10-CM

## 2024-06-05 DIAGNOSIS — M54.2 NECK PAIN: ICD-10-CM

## 2024-06-05 DIAGNOSIS — E55.9 VITAMIN D DEFICIENCY: ICD-10-CM

## 2024-06-05 DIAGNOSIS — M85.89 OSTEOPENIA OF MULTIPLE SITES: ICD-10-CM

## 2024-06-05 DIAGNOSIS — I34.1 MVP (MITRAL VALVE PROLAPSE): ICD-10-CM

## 2024-06-05 DIAGNOSIS — F33.9 DEPRESSION, RECURRENT: ICD-10-CM

## 2024-06-05 PROCEDURE — 3078F DIAST BP <80 MM HG: CPT | Mod: CPTII,S$GLB,, | Performed by: INTERNAL MEDICINE

## 2024-06-05 PROCEDURE — 99214 OFFICE O/P EST MOD 30 MIN: CPT | Mod: S$GLB,,, | Performed by: INTERNAL MEDICINE

## 2024-06-05 PROCEDURE — 3074F SYST BP LT 130 MM HG: CPT | Mod: CPTII,S$GLB,, | Performed by: INTERNAL MEDICINE

## 2024-06-05 PROCEDURE — 1158F ADVNC CARE PLAN TLK DOCD: CPT | Mod: CPTII,S$GLB,, | Performed by: INTERNAL MEDICINE

## 2024-06-05 PROCEDURE — 1125F AMNT PAIN NOTED PAIN PRSNT: CPT | Mod: CPTII,S$GLB,, | Performed by: INTERNAL MEDICINE

## 2024-06-05 PROCEDURE — 3044F HG A1C LEVEL LT 7.0%: CPT | Mod: CPTII,S$GLB,, | Performed by: INTERNAL MEDICINE

## 2024-06-05 PROCEDURE — 1160F RVW MEDS BY RX/DR IN RCRD: CPT | Mod: CPTII,S$GLB,, | Performed by: INTERNAL MEDICINE

## 2024-06-05 PROCEDURE — 99999 PR PBB SHADOW E&M-EST. PATIENT-LVL V: CPT | Mod: PBBFAC,,, | Performed by: INTERNAL MEDICINE

## 2024-06-05 PROCEDURE — 1159F MED LIST DOCD IN RCRD: CPT | Mod: CPTII,S$GLB,, | Performed by: INTERNAL MEDICINE

## 2024-06-05 PROCEDURE — 3288F FALL RISK ASSESSMENT DOCD: CPT | Mod: CPTII,S$GLB,, | Performed by: INTERNAL MEDICINE

## 2024-06-05 PROCEDURE — 1101F PT FALLS ASSESS-DOCD LE1/YR: CPT | Mod: CPTII,S$GLB,, | Performed by: INTERNAL MEDICINE

## 2024-06-05 NOTE — TELEPHONE ENCOUNTER
Patient is coming to [facility] for an exercise class on 6/6/24. She states she will complete her PHQ-9 at that time. Her form was left at the  for her to .

## 2024-06-05 NOTE — PROGRESS NOTES
"INTERNAL MEDICINE PROGRESS/URGENT CARE NOTE    CHIEF COMPLAINT     Chief Complaint   Patient presents with    Neck Pain     L side of neck has had pain for the past 2 weeks - no known injury       HPI     Rosalia Fernández is a very tricia 73 y.o.  female who presents with a PMHx of  diverticulitis, melanoma, MVP, osteoarthritis. Who presents today for routine follow up visit.         Her concerns and questions today are:   Increase in her anxiety due to family issues. Ie here mom recently passed and she's having issues with her brother. And her sister has been ill.   Left neck pain. She does see PT and we agreed she would discuss this with them and have them give her some excercises     At her most recent visit we discussed:   Still having soft  stools. Has an appointment with gastro. reviewed her colonscnoopy and is wnl done in 2021. Is now scheduled for a repeat colonsncopy in 2 weeks.  Reveiwed CT scan. Ordered all appropriate follow up. Ie pancreatic enzymes   Left neck stiffness and pain.    Mass on her abdomen. Says there a bulge when she exercises. Has an US done which showed no hernia.   4. Changes in vision. Needs cataract surgery. Has just "been talking about it"   5. Forgetfulness. Says its nothing new. Its always been this way. Mentions neurology testing and she says she sees Dr. Nagy.   6. Toe pain in the am. Went away when she had ablation for her lower back pain.   7.still thinks something is wrong in her colon. Says she feels a bulge and that combined with her bowel movements really worries her. Scheduled for upcoming colonoscopy.   8. Has a scheduled ablation for her back   9. Bitemporal pressures: has been seeing neurology. MRI shows no changes   10. Anxiety CARLEEN 7 today is 8/21 down from 18/21. Admittedly anxious but says due to her back issues which took a while     HLD: LDL is not at goal. Statin therapy agreed to today.      History of melanoma: advised to follow up with " dermatologby      Home Medications:  Prior to Admission medications    Medication Sig Start Date End Date Taking? Authorizing Provider   acetaminophen (TYLENOL) 500 MG tablet Take 1,000 mg by mouth every 6 (six) hours as needed for Pain.  Patient not taking: Reported on 4/18/2024    Provider, Historical   ascorbic acid (VITAMIN C) 500 MG tablet Take 500 mg by mouth once daily.    Provider, Historical   azelastine (ASTELIN) 137 mcg (0.1 %) nasal spray 1 spray (137 mcg total) by Nasal route 2 (two) times daily. 9/26/23 9/25/24  Cheryl Samson MD   CALCIUM CITRATE/VITAMIN D3 (CALCIUM CITRATE + ORAL) Take 1 tablet by mouth Daily.    Provider, Historical   cholecalciferol, vitamin D3, (VITAMIN D3) 50 mcg (2,000 unit) Cap capsule Take 2,000 Units by mouth once daily.    Provider, Historical   coQ10, ubiquinol, 100 mg Cap Take 1 tablet by mouth once daily.    Provider, Historical   krill oil 500 mg Cap Take 2 capsules by mouth Daily.    Provider, Historical   LACTOBACILLUS ACIDOPHILUS (PROBIOTIC ORAL) Take 1 tablet by mouth Daily.    Provider, Historical   LIDOcaine 4 % PtMd Apply topically daily as needed.    Provider, Historical   microfibrillar collagen powder Apply 1 g topically as needed.  Patient not taking: Reported on 4/18/2024    Provider, Historical   multivitamin (THERAGRAN) per tablet Take 1 tablet by mouth once daily.    Provider, Historical   psyllium (METAMUCIL) powder Take 1 packet by mouth 2 (two) times daily as needed.    Provider, Historical   rosuvastatin (CRESTOR) 10 MG tablet Take 1 tablet (10 mg total) by mouth once daily. 4/3/24 4/3/25  Sayda Barnhart MD       Review of Systems:  Review of Systems      Advance Care Planning     Date: 06/05/2024    Power of   I initiated the process of voluntary advance care planning today and explained the importance of this process to the patient.  I introduced the concept of advance directives to the patient, as well. Then the patient received  "detailed information about the importance of designating a Health Care Power of  (HCPOA). She was also instructed to communicate with this person about their wishes for future healthcare, should she become sick and lose decision-making capacity. The patient has previously appointed a HCPOA. After our discussion, the patient has decided to complete a HCPOA and has appointed her son, health care agent:  on file  & health care agent number:  on file  I spent a total time of 10 minutes discussing this issue with the patient.                  PHYSICAL EXAM     /74 (BP Location: Left arm, Patient Position: Sitting, BP Method: Medium (Manual))   Pulse 79   Temp 98 °F (36.7 °C) (Oral)   Resp 17   Ht 5' 4" (1.626 m)   Wt 48.9 kg (107 lb 11.1 oz)   SpO2 98%   BMI 18.49 kg/m²     GEN - A+OX4, NAD   HEENT - PERRL, EOMI, OP clear  Neck - No thyromegaly or cervical LAD. No thyroid masses felt.  CV - RRR, no m/r   Chest - CTAB, no wheezing or rhonchi  Abd - S/NT/ND/+BS.   Ext - 2+BDP and radial pulses. No C/C/E.  Skin - No rash.    LABS       ASSESSMENT/PLAN     Roaslia Fernández is a 73 y.o. female with  1. Mixed hyperlipidemia  Well controlled. Continue current managenebt  Low fat diet and excecrise     2. Vitamin D deficiency  Overview:  Continue with vitamin D supplementation       3. Prediabetes  Overview:  Counseled on limiting carbs in her diet   Has improved. Encouraged to continue with proper diet       4. Osteopenia of multiple sites  Overview:  Encouraged calcium and vitamin D  Encouraged gait  Building excercises and fall prevention      5. MVP (mitral valve prolapse)  Overview:  Asymptomatic  Continue to monitor      6. Sensorineural hearing loss (SNHL) of both ears  Overview:  With hearing aids which work well for her       7. Depression, recurrent  Overview:  Declines this diagnosis. Says its more stress and anxiety  Denies SI/HI  Declines medication and therapy. "doesn't need it"       8. " Benign paroxysmal positional vertigo of right ear  Overview:  Resolved   Encouraged head excercises      9. Neck pain  Advised PT, massage and heat           WORRY SCORE 2    RTC in 3 months, sooner if needed and depending on labs.    Sayda Barnhart MD  Board Certified Internist/Geriatrician  [facility] ([place])

## 2024-06-05 NOTE — TELEPHONE ENCOUNTER
Patient did not complete the PHQ-9. I spoke with her, informing her. Patient asked to call back tomorrow 6/6/24 to complete it.

## 2024-06-06 ENCOUNTER — TELEPHONE (OUTPATIENT)
Dept: OBSTETRICS AND GYNECOLOGY | Facility: CLINIC | Age: 74
End: 2024-06-06
Payer: MEDICARE

## 2024-06-06 RX ORDER — VALACYCLOVIR HYDROCHLORIDE 500 MG/1
500 TABLET, FILM COATED ORAL 2 TIMES DAILY
Qty: 10 TABLET | Refills: 3 | Status: SHIPPED | OUTPATIENT
Start: 2024-06-06 | End: 2024-06-11

## 2024-06-06 NOTE — TELEPHONE ENCOUNTER
----- Message from Figueroa Peters sent at 6/6/2024  2:17 PM CDT -----  Contact: Self  Type: Needs Medical Advice  Who Called:  Patient    Pharmacy name and phone #:    [id number] - [facility] [address] [facility] [address] [facility] [address]  [id number] [place] [id number]  Phone: [phone] Fax: [phone]      Best Call Back Number: [phone]   Additional Information:  Called to have prescription for herpes refilled. States cannot recall name.

## 2024-06-10 ENCOUNTER — DOCUMENTATION ONLY (OUTPATIENT)
Dept: REHABILITATION | Facility: HOSPITAL | Age: 74
End: 2024-06-10
Payer: MEDICARE

## 2024-06-10 NOTE — PROGRESS NOTES
"Health  Wellness Visit Note    Name: Rosalia Fernández  Clinic Number: [id number]  Physician: No ref. provider found  Diagnosis: No diagnosis found.  Past Medical History:   Diagnosis Date    Colon polyps     Diverticulitis     Diverticulosis     Endometriosis     Hemorrhage     "microhemorrhage of brain"     Infertility, female     Melanoma     MVP (mitral valve prolapse)     Osteoarthritis      Visit Number: 55  Precautions: standard      1st PT visit:  6/18/23  Year of care end date:  6/18/24  Mindbody plan: Plan A - 10 months  Patient level:b    Time In: 10:55 AM  Time Out: 11:50 AM  Total Treatment Time: 55 minutes    [employer] 2022  Handout on this week's wellness topic of Anxiety provided along with a discussion on what it means, the benefits, and suggestions for practice.  Reviewed last week's topic of Guilt and Shame      Subjective:   Patient reports no complaints. Patient usually walks for 6 days a week. She walks around 40 minutes and does her stretches after. Patient started water aerobics for the first time in over a year. She did not use any ice this week.      Objective:   Rosalia completed therapeutic stretches (EIL, PARISA) and the following MedX exercise machines: core lumbar, torso rotation l/r, leg extension, leg curl, upright row, chest press, biceps curl, triceps extension, leg press    See exercise log in patient folder for rate of exertion and repetitions completed.       Fitness Machine Education Key:  E=education on equipment initiated and further follow up and education needed  I=independent with  and exercise.  The patient:  Adjusts machines to his/her settings  Uses equipment levers, pins, weights safely  Maintains safe and correct posture while exercising  Moves through exercise with correct pace and control  Gets on and off equipment safely      Lumbar/Cervical Ext.  Torso Rotation  Leg Press    Leg Extension  Seated Leg Curl  Chest Press    Seated Row  Hip ADD  Hip " ABD    Triceps Extension  Bicep Curl  Other:      [x] Indicates exercise has been taught for home  Lumbar/Cervical Ext. [x] Torso Rotation [x] Leg Press [x]   Leg Extension [x] Seated Leg Curl [x] Chest Press [x]   Seated Row [x] Hip ADD [x] Hip ABD [x]   Triceps Extension [x] Bicep Curl [x] Other:        Assessment:   Patient tolerated MedX Core Lumbar Strength and all other peripheral exercises without an increase in symptoms. Patient warmed up on treadmill for 8 minutes, stretched, and iced low back for 5 minutes after the workout.     Plan:  Continue with established plan of care towards wellness goals.     Health  : Jovanna Zacarias  6/10/2024

## 2024-06-17 ENCOUNTER — DOCUMENTATION ONLY (OUTPATIENT)
Dept: REHABILITATION | Facility: HOSPITAL | Age: 74
End: 2024-06-17
Payer: MEDICARE

## 2024-06-17 NOTE — PROGRESS NOTES
"Health  Wellness Visit Note    Name: Rosalia Fernández  Clinic Number: [id number]  Physician: No ref. provider found  Diagnosis: No diagnosis found.  Past Medical History:   Diagnosis Date    Colon polyps     Diverticulitis     Diverticulosis     Endometriosis     Hemorrhage     "microhemorrhage of brain"     Infertility, female     Melanoma     MVP (mitral valve prolapse)     Osteoarthritis      Visit Number: 57  Precautions: standard      1st PT visit:  6/18/23  Year of care end date:  6/18/24  Mindbody plan: Plan A - 10 months  Patient level:b    Time In: 10:55 AM  Time Out: 12:03 AM  Total Treatment Time: 68 minutes    [employer] 2022  Handout on this week's wellness topic of Avoidance provided along with a discussion on what it means, the benefits, and suggestions for practice.  Reviewed last week's topic of Anxiety      Subjective:   Patient reports no complaints. Patient usually walks for 6 days a week. She walks around 40 minutes and does her stretches after. Patient started water aerobics for the first time in over a year. She did not use any ice this week. HC showed patient discharge HEP exercises.     Objective:   Rosalia completed therapeutic stretches (EIL, PARISA) and the following MedX exercise machines: core lumbar, torso rotation l/r, leg extension, leg curl, upright row, chest press, biceps curl, triceps extension, leg press    See exercise log in patient folder for rate of exertion and repetitions completed.       Fitness Machine Education Key:  E=education on equipment initiated and further follow up and education needed  I=independent with  and exercise.  The patient:  Adjusts machines to his/her settings  Uses equipment levers, pins, weights safely  Maintains safe and correct posture while exercising  Moves through exercise with correct pace and control  Gets on and off equipment safely      Lumbar/Cervical Ext.  Torso Rotation  Leg Press    Leg Extension  Seated Leg Curl  " Chest Press    Seated Row  Hip ADD  Hip ABD    Triceps Extension  Bicep Curl  Other:      [x] Indicates exercise has been taught for home  Lumbar/Cervical Ext. [x] Torso Rotation [x] Leg Press [x]   Leg Extension [x] Seated Leg Curl [x] Chest Press [x]   Seated Row [x] Hip ADD [x] Hip ABD [x]   Triceps Extension [x] Bicep Curl [x] Other:        Assessment:   Patient tolerated MedX Core Lumbar Strength and all other peripheral exercises without an increase in symptoms. Patient warmed up on treadmill for 8 minutes, stretched, and iced low back for 5 minutes after the workout.     Plan:  Continue with established plan of care towards wellness goals.     Health  : Jovanna Zacarias  6/17/2024

## 2024-06-21 ENCOUNTER — TELEPHONE (OUTPATIENT)
Dept: PRIMARY CARE CLINIC | Facility: CLINIC | Age: 74
End: 2024-06-21
Payer: MEDICARE

## 2024-06-24 ENCOUNTER — TELEPHONE (OUTPATIENT)
Dept: PRIMARY CARE CLINIC | Facility: CLINIC | Age: 74
End: 2024-06-24

## 2024-06-24 ENCOUNTER — OFFICE VISIT (OUTPATIENT)
Dept: PRIMARY CARE CLINIC | Facility: CLINIC | Age: 74
End: 2024-06-24
Payer: MEDICARE

## 2024-06-24 VITALS
HEIGHT: 64 IN | DIASTOLIC BLOOD PRESSURE: 62 MMHG | BODY MASS INDEX: 18.08 KG/M2 | HEART RATE: 77 BPM | TEMPERATURE: 98 F | RESPIRATION RATE: 20 BRPM | WEIGHT: 105.94 LBS | OXYGEN SATURATION: 99 % | SYSTOLIC BLOOD PRESSURE: 118 MMHG

## 2024-06-24 DIAGNOSIS — M51.36 LUMBAR DEGENERATIVE DISC DISEASE: ICD-10-CM

## 2024-06-24 DIAGNOSIS — E78.2 MIXED HYPERLIPIDEMIA: ICD-10-CM

## 2024-06-24 DIAGNOSIS — Z00.00 ENCOUNTER FOR PREVENTIVE HEALTH EXAMINATION: Primary | ICD-10-CM

## 2024-06-24 DIAGNOSIS — E55.9 VITAMIN D DEFICIENCY: ICD-10-CM

## 2024-06-24 DIAGNOSIS — F33.9 DEPRESSION, RECURRENT: ICD-10-CM

## 2024-06-24 DIAGNOSIS — H90.3 SENSORINEURAL HEARING LOSS (SNHL) OF BOTH EARS: ICD-10-CM

## 2024-06-24 DIAGNOSIS — Z85.820 HISTORY OF MELANOMA: ICD-10-CM

## 2024-06-24 DIAGNOSIS — K59.9 BOWEL DYSFUNCTION: ICD-10-CM

## 2024-06-24 DIAGNOSIS — R63.6 UNDERWEIGHT: ICD-10-CM

## 2024-06-24 DIAGNOSIS — M85.89 OSTEOPENIA OF MULTIPLE SITES: ICD-10-CM

## 2024-06-24 PROCEDURE — 1101F PT FALLS ASSESS-DOCD LE1/YR: CPT | Mod: CPTII,S$GLB,, | Performed by: PHYSICIAN ASSISTANT

## 2024-06-24 PROCEDURE — G0439 PPPS, SUBSEQ VISIT: HCPCS | Mod: S$GLB,,, | Performed by: PHYSICIAN ASSISTANT

## 2024-06-24 PROCEDURE — 1170F FXNL STATUS ASSESSED: CPT | Mod: CPTII,S$GLB,, | Performed by: PHYSICIAN ASSISTANT

## 2024-06-24 PROCEDURE — 3074F SYST BP LT 130 MM HG: CPT | Mod: CPTII,S$GLB,, | Performed by: PHYSICIAN ASSISTANT

## 2024-06-24 PROCEDURE — 1158F ADVNC CARE PLAN TLK DOCD: CPT | Mod: CPTII,S$GLB,, | Performed by: PHYSICIAN ASSISTANT

## 2024-06-24 PROCEDURE — 1159F MED LIST DOCD IN RCRD: CPT | Mod: CPTII,S$GLB,, | Performed by: PHYSICIAN ASSISTANT

## 2024-06-24 PROCEDURE — 3288F FALL RISK ASSESSMENT DOCD: CPT | Mod: CPTII,S$GLB,, | Performed by: PHYSICIAN ASSISTANT

## 2024-06-24 PROCEDURE — 99999 PR PBB SHADOW E&M-EST. PATIENT-LVL V: CPT | Mod: PBBFAC,,, | Performed by: PHYSICIAN ASSISTANT

## 2024-06-24 PROCEDURE — 1160F RVW MEDS BY RX/DR IN RCRD: CPT | Mod: CPTII,S$GLB,, | Performed by: PHYSICIAN ASSISTANT

## 2024-06-24 PROCEDURE — 3078F DIAST BP <80 MM HG: CPT | Mod: CPTII,S$GLB,, | Performed by: PHYSICIAN ASSISTANT

## 2024-06-24 PROCEDURE — 3044F HG A1C LEVEL LT 7.0%: CPT | Mod: CPTII,S$GLB,, | Performed by: PHYSICIAN ASSISTANT

## 2024-06-24 NOTE — TELEPHONE ENCOUNTER
Pt called, wanted to discuss that form that you were doing this morning with her during her appt.

## 2024-06-24 NOTE — PROGRESS NOTES
"  Rosalia Fernández presented for a follow-up Medicare AWV today. The following components were reviewed and updated:    Medical history  Family History  Social history  Allergies and Current Medications  Health Risk Assessment  Health Maintenance  Care Team    **See Completed Assessments for Annual Wellness visit with in the encounter summary    The following assessments were completed:  Depression Screening  Cognitive function Screening  Timed Get Up Test  Whisper Test        Opioid documentation:      Patient does not have a current opioid prescription.          Vitals:    06/24/24 0854   BP: 118/62   BP Location: Right arm   Patient Position: Sitting   BP Method: Medium (Manual)   Pulse: 77   Resp: 20   Temp: 97.8 °F (36.6 °C)   TempSrc: Oral   SpO2: 99%   Weight: 48.1 kg (105 lb 14.9 oz)   Height: 5' 4" (1.626 m)     Body mass index is 18.18 kg/m².       Physical Exam  Vitals and nursing note reviewed.   Constitutional:       General: She is not in acute distress.     Appearance: Normal appearance. She is not ill-appearing.   HENT:      Head: Normocephalic and atraumatic.      Right Ear: External ear normal.      Left Ear: External ear normal.   Eyes:      General:         Right eye: No discharge.         Left eye: No discharge.      Extraocular Movements: Extraocular movements intact.      Conjunctiva/sclera: Conjunctivae normal.   Cardiovascular:      Rate and Rhythm: Normal rate and regular rhythm.   Pulmonary:      Effort: Pulmonary effort is normal. No respiratory distress.   Skin:     General: Skin is warm and dry.      Coloration: Skin is not jaundiced or pale.   Neurological:      General: No focal deficit present.      Mental Status: She is alert.   Psychiatric:         Mood and Affect: Mood normal.         Behavior: Behavior normal.         Thought Content: Thought content normal.         Judgment: Judgment normal.           Diagnoses and health risks identified today and associated " recommendations/orders:  1. Encounter for preventive health examination  Annual wellness visit completed today    2. Underweight  Discussed strategies such as protein drinks    3. Lumbar degenerative disc disease  Stable  Follow up by pain medicine    4. Depression, recurrent  Stable  Follow up by PCP    5. Sensorineural hearing loss (SNHL) of both ears  Wears hearing aids    6. Mixed hyperlipidemia  Stable  On statin  Follow up by PCP    7. History of melanoma  Follow up by Dermatology    8. Vitamin D deficiency  Stable  On D supplement  Follow up by PCP    9. Bowel dysfunction  Stable  Followed by Gastroenterology    10. Osteopenia of multiple sites  Stable  On calcium and D supplements  Followed by PCP      Provided Rosalia with a 5-10 year written screening schedule and personal prevention plan. Recommendations were developed using the USPSTF age appropriate recommendations. Education, counseling, and referrals were provided as needed.  After Visit Summary printed and given to patient which includes a list of additional screenings\tests needed.    No follow-ups on file.      FLORENCIO Martin  I offered to discuss advanced care planning, including how to pick a person who would make decisions for you if you were unable to make them for yourself, called a health care power of , and what kind of decisions you might make such as use of life sustaining treatments such as ventilators and tube feeding when faced with a life limiting illness recorded on a living will that they will need to know. (How you want to be cared for as you near the end of your natural life)     X Patient is interested in learning more about how to make advanced directives.  I provided them paperwork and offered to discuss this with them.

## 2024-09-06 ENCOUNTER — OFFICE VISIT (OUTPATIENT)
Dept: PRIMARY CARE CLINIC | Facility: CLINIC | Age: 74
End: 2024-09-06
Payer: MEDICARE

## 2024-09-06 VITALS
SYSTOLIC BLOOD PRESSURE: 114 MMHG | HEIGHT: 64 IN | OXYGEN SATURATION: 96 % | BODY MASS INDEX: 18.58 KG/M2 | HEART RATE: 76 BPM | DIASTOLIC BLOOD PRESSURE: 60 MMHG | WEIGHT: 108.81 LBS

## 2024-09-06 DIAGNOSIS — R53.83 FATIGUE, UNSPECIFIED TYPE: ICD-10-CM

## 2024-09-06 DIAGNOSIS — E78.2 MIXED HYPERLIPIDEMIA: ICD-10-CM

## 2024-09-06 DIAGNOSIS — M54.2 NECK PAIN: ICD-10-CM

## 2024-09-06 DIAGNOSIS — F33.9 DEPRESSION, RECURRENT: Primary | ICD-10-CM

## 2024-09-06 DIAGNOSIS — M21.962 DEFORMITY OF BOTH FEET: ICD-10-CM

## 2024-09-06 DIAGNOSIS — M85.89 OSTEOPENIA OF MULTIPLE SITES: ICD-10-CM

## 2024-09-06 DIAGNOSIS — H81.11 BENIGN PAROXYSMAL POSITIONAL VERTIGO OF RIGHT EAR: ICD-10-CM

## 2024-09-06 DIAGNOSIS — M21.961 DEFORMITY OF BOTH FEET: ICD-10-CM

## 2024-09-06 DIAGNOSIS — E55.9 VITAMIN D DEFICIENCY: ICD-10-CM

## 2024-09-06 DIAGNOSIS — R73.03 PREDIABETES: ICD-10-CM

## 2024-09-06 DIAGNOSIS — K59.9 BOWEL DYSFUNCTION: ICD-10-CM

## 2024-09-06 PROCEDURE — 99999 PR PBB SHADOW E&M-EST. PATIENT-LVL V: CPT | Mod: PBBFAC,,, | Performed by: INTERNAL MEDICINE

## 2024-09-06 NOTE — PROGRESS NOTES
"INTERNAL MEDICINE PROGRESS/URGENT CARE NOTE    CHIEF COMPLAINT     Chief Complaint   Patient presents with    Follow-up     Patient presents for 3 month follow up appointment.    Foot Pain     Patient complaining of right foot pain and would like referral to podiatry for eval.       HPI     Rosalia Fernández is a very tricia 73 y.o.  female who presents with a PMHx of  diverticulitis, melanoma, MVP, osteoarthritis. Who presents today for routine follow up visit.         Her concerns and questions today are:   Foot pain. Calluses.   Neck pain still says she has not worked with PT like we had discussed     At her most recent visit, we discussed:   Increase in her anxiety due to family issues. Ie here mom recently passed and she's having issues with her brother. And her sister has been ill.   Left neck pain. She does see PT and we agreed she would discuss this with them and have them give her some excercises      At her previous  visits, we discussed:   Still having soft  stools. Has an appointment with gastro. reviewed her colonscnoopy and is wnl done in 2021. Is now scheduled for a repeat colonsncopy in 2 weeks.  Reveiwed CT scan. Ordered all appropriate follow up. Ie pancreatic enzymes   Left neck stiffness and pain.    Mass on her abdomen. Says there a bulge when she exercises. Has an US done which showed no hernia.   4. Changes in vision. Needs cataract surgery. Has just "been talking about it"   5. Forgetfulness. Says its nothing new. Its always been this way. Mentions neurology testing and she says she sees Dr. Nagy.   6. Toe pain in the am. Went away when she had ablation for her lower back pain.   7.still thinks something is wrong in her colon. Says she feels a bulge and that combined with her bowel movements really worries her. Scheduled for upcoming colonoscopy.   8. Has a scheduled ablation for her back   9. Bitemporal pressures: has been seeing neurology. MRI shows no changes   10. Anxiety CARLEEN 7 today is " 8/21 down from 18/21. Admittedly anxious but says due to her back issues which took a while     HLD: LDL is not at goal. Statin therapy agreed to today.      History of melanoma: advised to follow up with dermatologby    Home Medications:  Prior to Admission medications    Medication Sig Start Date End Date Taking? Authorizing Provider   acetaminophen (TYLENOL) 500 MG tablet Take 1,000 mg by mouth every 6 (six) hours as needed for Pain.    Provider, Historical   ascorbic acid (VITAMIN C) 500 MG tablet Take 500 mg by mouth once daily.    Provider, Historical   azelastine (ASTELIN) 137 mcg (0.1 %) nasal spray 1 spray (137 mcg total) by Nasal route 2 (two) times daily. 9/26/23 9/25/24  Cheryl Samson MD   CALCIUM CITRATE/VITAMIN D3 (CALCIUM CITRATE + ORAL) Take 1 tablet by mouth Daily.    Provider, Historical   cholecalciferol, vitamin D3, (VITAMIN D3) 50 mcg (2,000 unit) Cap capsule Take 2,000 Units by mouth once daily.    Provider, Historical   coQ10, ubiquinol, 100 mg Cap Take 1 tablet by mouth once daily.    Provider, Historical   krill oil 500 mg Cap Take 2 capsules by mouth Daily.    Provider, Historical   LACTOBACILLUS ACIDOPHILUS (PROBIOTIC ORAL) Take 1 tablet by mouth Daily.    Provider, Historical   LIDOcaine 4 % PtMd Apply topically daily as needed.  Patient not taking: Reported on 6/5/2024    Provider, Historical   microfibrillar collagen powder Apply 1 g topically as needed.  Patient not taking: Reported on 6/24/2024    Provider, Historical   multivitamin (THERAGRAN) per tablet Take 1 tablet by mouth once daily.    Provider, Historical   psyllium (METAMUCIL) powder Take 1 packet by mouth 2 (two) times daily as needed.    Provider, Historical   rosuvastatin (CRESTOR) 10 MG tablet Take 1 tablet (10 mg total) by mouth once daily. 4/3/24 4/3/25  Sayda Barnhart MD   valACYclovir (VALTREX) 500 MG tablet Take 1 tablet (500 mg total) by mouth 2 (two) times daily. for 5 days 6/6/24 6/24/24  Chepe Ding  "MD DEVEN       Review of Systems:  Review of Systems      Advance Care Planning     Date: 09/06/2024    Power of   I initiated the process of voluntary advance care planning today and explained the importance of this process to the patient.  I introduced the concept of advance directives to the patient, as well. Then the patient received detailed information about the importance of designating a Health Care Power of  (HCPOA). She was also instructed to communicate with this person about their wishes for future healthcare, should she become sick and lose decision-making capacity. The patient has previously appointed a HCPOA. After our discussion, the patient has decided to complete a HCPOA and has appointed her significant other, health care agent:  on file  & health care agent number:  on file . I encouraged her to communicate with this person about their wishes for future healthcare, should she become sick and lose decision-making capacity.      A total of 10 min was spent on advance care planning, goals of care discussion, emotional support, formulating and communicating prognosis and exploring burden/benefit of various approaches of treatment. This discussion occurred on a fully voluntary basis with the verbal consent of the patient and/or family.             PHYSICAL EXAM     /60 (BP Location: Left arm, Patient Position: Sitting, BP Method: Medium (Manual))   Pulse 76   Ht 5' 4" (1.626 m)   Wt 49.3 kg (108 lb 12.8 oz)   SpO2 96%   BMI 18.67 kg/m²     GEN - A+OX4, NAD   HEENT - PERRL, EOMI, OP clear  Neck - No thyromegaly or cervical LAD. No thyroid masses felt.  CV - RRR, no m/r   Chest - CTAB, no wheezing or rhonchi  Abd - S/NT/ND/+BS.   Ext - 2+BDP and radial pulses. No C/C/E.  Skin - No rash.    LABS       ASSESSMENT/PLAN     Rosalia Fernández is a 73 y.o. female with  1. Depression, recurrent  Overview:  Declines this diagnosis. Says its more stress and anxiety  Denies " "SI/HI  Declines medication and therapy. "doesn't need it"       2. Mixed hyperlipidemia  Very well controlled.   Adhere to a low cholesterol   -     Lipid Panel; Future; Expected date: 09/06/2024    3. Vitamin D deficiency  Overview:  Continue with vitamin D supplementation       4. Bowel dysfunction    5. Osteopenia of multiple sites  Overview:  Encouraged calcium and vitamin D  Encouraged gait  Building excercises and fall prevention      6. Prediabetes  Overview:  Counseled on limiting carbs in her diet     Orders:  -     Hemoglobin A1C; Future; Expected date: 09/06/2024    7. Benign paroxysmal positional vertigo of right ear  Overview:  Resolved   Encouraged head excercises      8. Deformity of both feet  -     Ambulatory referral/consult to Podiatry; Future; Expected date: 09/13/2024    9. Neck pain  -     Ambulatory referral/consult to Physical/Occupational Therapy; Future; Expected date: 09/13/2024    10. Fatigue, unspecified type  -     CBC Auto Differential; Future; Expected date: 09/06/2024  -     Comprehensive Metabolic Panel; Future; Expected date: 09/06/2024  -     TSH; Future; Expected date: 09/06/2024           WORRY SCORE 1    RTC in 6 months, sooner if needed and depending on labs.    Sayda Barnhart MD  Board Certified Internist/Geriatrician  [facility]      "

## 2024-09-09 ENCOUNTER — TELEPHONE (OUTPATIENT)
Dept: PODIATRY | Facility: CLINIC | Age: 74
End: 2024-09-09
Payer: MEDICARE

## 2024-09-09 NOTE — TELEPHONE ENCOUNTER
----- Message from Alec Molina sent at 9/9/2024  8:01 AM CDT -----  Regarding: return call  Contact: patient  Type:  Patient Returning Call    Who Called:patient  Who Left Message for Patient:office staff  Does the patient know what this is regarding?:patient canceled  her appointment due to insurance out of network/ patient wanted staff to know why she canceled  Would the patient rather a call back or a response via [facility]?   Best Call Back Number:[phone]  Additional Information:

## 2024-09-10 ENCOUNTER — TELEPHONE (OUTPATIENT)
Dept: PODIATRY | Facility: CLINIC | Age: 74
End: 2024-09-10
Payer: MEDICARE

## 2024-09-10 NOTE — TELEPHONE ENCOUNTER
----- Message from Fior Sahu sent at 9/10/2024  1:21 PM CDT -----  Regarding: INSURANCE CONCERN  Contact: [phone]  Good afternoon,     Pt called, stating she has a scheduled appointment with your office on Monday, 10/14 @ 1:40 pm.  She is requesting a call back from clinical staff to discuss Insurance - asking if there is a problem, and will her Insurance be accepted for the scheduled appointment .  Pt is requesting a call back from clinical staff  to discuss.    Thank you,  Berenice Pizarro Navigator

## 2024-09-23 ENCOUNTER — CLINICAL SUPPORT (OUTPATIENT)
Dept: REHABILITATION | Facility: HOSPITAL | Age: 74
End: 2024-09-23
Payer: MEDICARE

## 2024-09-23 DIAGNOSIS — M54.2 NECK PAIN: ICD-10-CM

## 2024-09-23 DIAGNOSIS — R41.840 CONCENTRATION DEFICIT: Primary | ICD-10-CM

## 2024-09-23 PROCEDURE — 97161 PT EVAL LOW COMPLEX 20 MIN: CPT | Mod: PO | Performed by: PHYSICAL THERAPIST

## 2024-09-23 PROCEDURE — 97112 NEUROMUSCULAR REEDUCATION: CPT | Mod: PO | Performed by: PHYSICAL THERAPIST

## 2024-09-24 PROBLEM — R41.840 CONCENTRATION DEFICIT: Status: ACTIVE | Noted: 2024-09-24

## 2024-09-24 NOTE — PLAN OF CARE
"[facility]   Physical Therapy Initial Evaluation      Name: Rosalia Fernández  Clinic Number: [id number]    Therapy Diagnosis:   Encounter Diagnoses   Name Primary?    Neck pain     Concentration deficit Yes        Physician: Sayda Barnhart MD    Physician Orders: PT Eval and Treat   Post Surgical?No Eval and TreatYes Type of TherapyOutpatient Therapy Location:Neck  Medical Diagnosis from Referral: M54.2 (ICD-10-CM) - Neck pain  Evaluation Date: 9/23/2024  Authorization Period Expiration: 12/31/2024  Plan of Care Expiration: 10/25/2024  Progress Note Due: 10/22/2024  Date of Surgery: na  Visit # / Visits authorized: 1/ 1   FOTO: 1/ 3 (9/23/2024)    Precautions: Standard and OA, CA     Time In: 900  Time Out: 945  Total Billable Time: 45 minutes    Subjective     Date of onset: about 5/2024    History of current condition - Karmen reports: neck pain that may have started while working in [employer] or with Health  while exercising. She has Left sided neck pain that she reports is not bad but has not gone away. She has pain that is not sometimes there. This does not stop her from turning her head or driving. She reports cautious movements.    Falls: 0    Imaging: none    Prior Therapy: none  Social History:  lives with their spouse  Occupation: retired  Prior Level of Function: no neck pain  Current Level of Function: Neck pain presents difficulty to head turning and concentration    Pain:  Current 1/10, worst 1/10, best 0/10   Location: left neck    Description: Aching and Dull  Aggravating Factors: turning  Easing Factors: rest    Patients goals: To alleviate the pn.     Medical History:   Past Medical History:   Diagnosis Date    Colon polyps     Diverticulitis     Diverticulosis     Endometriosis     Hemorrhage     "microhemorrhage of brain"     Infertility, female     Melanoma     MVP (mitral valve prolapse)     Osteoarthritis      Surgical History:   Rosalia Fernández  has a " past surgical history that includes Hysterectomy; Tonsillectomy; Melanoma Removal; Hernia repair; Endometriosis; Colonoscopy (06/29/2021); DEXA (2016); Oophorectomy; Toe Surgery (Left); Injection of anesthetic agent around medial branch nerves innervating lumbar facet joint (Left, 04/12/2023); Injection of anesthetic agent around medial branch nerves innervating lumbar facet joint (Left, 09/22/2023); Ablation of medial branch nerve of lumbar spine facet joint (Left, 11/15/2023); and Colonoscopy (N/A, 6/27/2024).    Medications:   Rosalia has a current medication list which includes the following prescription(s): acetaminophen, ascorbic acid (vitamin c), azelastine, calcium citrate/vitamin d3, cholecalciferol (vitamin d3), coq10 (ubiquinol), krill oil, lactobacillus acidophilus, multivitamin, psyllium, and rosuvastatin.    Allergies:   Review of patient's allergies indicates:   Allergen Reactions    Aspirin Other (See Comments)     Risk of brain bleed    Celebrex [celecoxib]      Fuzzy feeling    Prednisone Other (See Comments)     Blurry vision, not sure if dose was too much for her.    Tramadol      Fuzzy feeling      Objective      Observation: FHP, rounded shoulders    Posture: fair    Cervical Range of Motion:    Degrees Pain   Flexion 60 -     Extension 60 + at end range     Right Rotation 55 -     Left Rotation 50 -     Right Side Bending 60 -   Left Side Bending 50 +      Shoulder Range of Motion:   Shoulder Left Right   Flexion wnl wnl   Abduction wnl wnl   ER wnl wnl   IR wnl wnl     Strength:  Cervical MMT   Flexion 4/5   Extension 5/5   Right Side Bend 4/5   Left Side Bend 3+/5     Upper Extremity Strength  (R) UE  (L) UE    Shoulder flexion: 3+/5 Shoulder flexion: 3+/5   Shoulder Abduction: 4/5 Shoulder abduction: 4/5   Shoulder ER 4/5 Shoulder ER 4/5   Shoulder IR 4/5 Shoulder IR 4/5   Rhomboids 4-/5 Rhomboids 4-/5     Special Tests:  Distraction -   Compression -   Spurlings -     Palpation: pos Left UT   "    Sensation: intact bilateral UEs    Intake Outcome Measure for FOTO NECK Survey    Therapist reviewed FOTO scores for Rosalia Fernández on 9/23/2024.   FOTO report - see Media section or FOTO account episode details.    Intake Score: 65  Goal: 70       Treatment     Total Treatment time (time-based codes) separate from Evaluation: 10 minutes     Karmen received the treatments listed below:      neuromuscular re-education activities to improve: Sense, Proprioception, and Posture for 10 minutes. The following activities were included:  2x/day:  UPPER TRAP stretching 3 x 30"  Seated chin tucks x 20  No money with resistance 2 x 10    Patient Education and Home Exercises     Education provided:   - HEP  - Pt/family was provided educational information, including: role of PT, role of pt/caregiver, goals for PT, POC, scheduling, and attendance policy.- pt verbalized understanding.     Written Home Exercises Provided: Yes. Exercises were reviewed and Karmen was able to demonstrate them prior to the end of the session.  Karmen demonstrated good  understanding of the education provided. See EMR under Patient Instructions for exercises provided during therapy sessions.    Assessment     Rosalia is a 73 y.o. female referred to outpatient Physical Therapy with a medical diagnosis of Neck pain. Patient presents with impaired cervical spine ROM, impaired strength, and impaired concentration and QOL. She reports the pain is minimal but just wants it to return to normal.    Patient prognosis is Good.   Patient will benefit from skilled outpatient Physical Therapy to address the deficits stated above and in the chart below, provide patient /family education, and to maximize patientt's level of independence.     Plan of care discussed with patient: Yes  Patient's spiritual, cultural and educational needs considered and patient is agreeable to the plan of care and goals as stated below:     Anticipated Barriers for therapy: " "age, coping style (Pt had some trouble answering FOTO questionnaire.)    Medical Necessity is demonstrated by the following  History  Co-morbidities and personal factors that may impact the plan of care [] LOW: no personal factors / co-morbidities  [] MODERATE: 1-2 personal factors / co-morbidities  [] HIGH: 3+ personal factors / co-morbidities    Moderate / High Support Documentation:   Co-morbidities affecting plan of care:   Past Medical History:   Diagnosis Date    Colon polyps     Diverticulitis     Diverticulosis     Endometriosis     Hemorrhage     "microhemorrhage of brain"     Infertility, female     Melanoma     MVP (mitral valve prolapse)     Osteoarthritis        Personal Factors:   age  coping style  lifestyle     Examination  Body Structures and Functions, activity limitations and participation restrictions that may impact the plan of care [] LOW: addressing 1-2 elements  [x] MODERATE: 3+ elements  [] HIGH: 4+ elements (please support below)    Moderate / High Support Documentation: Patient presents with impaired cervical spine ROM, impaired strength, and impaired concentration and QOL.     Clinical Presentation [x] LOW: stable  [] MODERATE: Evolving  [] HIGH: Unstable     Decision Making/ Complexity Score: low       Goals:  Short Term Goals: 2 weeks   Pt will demo Left cervical spine lat flexion strength 4/5 for improving pain.  Pt will demo bilateral shoulder flexion to 4-/5 or greater for improving strength.    Long Term Goals: 4 weeks   FOTO score improved to 70 for improving QOL.    Plan     Plan of care Certification: 9/23/2024 to 10/25/2024.    Outpatient Physical Therapy 1-2 times weekly for 4 weeks to include the following interventions: Cervical/Lumbar Traction, Electrical Stimulation IFC, Manual Therapy, Moist Heat/ Ice, Neuromuscular Re-ed, Patient Education, Therapeutic Activities, and Therapeutic Exercise.     Lary Martinez, PT        I CERTIFY THE NEED FOR THESE SERVICES FURNISHED UNDER " THIS PLAN OF TREATMENT AND WHILE UNDER MY CARE    Physician's comments:        Physician's Signature: ___________________________________________________ DATE:_______________________

## 2024-09-27 ENCOUNTER — CLINICAL SUPPORT (OUTPATIENT)
Dept: REHABILITATION | Facility: HOSPITAL | Age: 74
End: 2024-09-27
Payer: MEDICARE

## 2024-09-27 DIAGNOSIS — R41.840 CONCENTRATION DEFICIT: Primary | ICD-10-CM

## 2024-09-27 PROCEDURE — 97112 NEUROMUSCULAR REEDUCATION: CPT | Mod: PO,CQ

## 2024-09-27 NOTE — PROGRESS NOTES
"  [facility]   Physical Therapy Treatment Note     Name: Rosalia Fernández  Clinic Number: [id number]    Therapy Diagnosis:   Encounter Diagnosis   Name Primary?    Concentration deficit Yes     Physician: Sayda Barnhart MD    Visit Date: 9/27/2024    Physician: Sayda Barnhart MD     Physician Orders: PT Eval and Treat   Post Surgical?No Eval and TreatYes Type of TherapyOutpatient Therapy Location:Neck  Medical Diagnosis from Referral: M54.2 (ICD-10-CM) - Neck pain  Evaluation Date: 9/23/2024  Authorization Period Expiration: 12/31/2024  Plan of Care Expiration: 10/25/2024  Progress Note Due: 10/22/2024  Date of Surgery: na  Visit # / Visits authorized: 1/ 12   FOTO: 1/ 3 (9/23/2024)     Precautions: Standard and OA, [place]      Time In: 10:00 am  Time Out: 11:00 am  Total Billable Time: 30 minutes         SUBJECTIVE     Pt reports: neck pain on L side. She was sore post last visit.  She was compliant with home exercise program.  Response to previous treatment: No change   Functional change: None    Pain: 1/10  Location: left neck      OBJECTIVE     Objective Measures updated at progress report unless specified.       TREATMENT     Karmen received the treatments listed below:        therapeutic activities to improve functional performance and functional mobility for 00  minutes, including:      received therapeutic exercises to develop strength and ROM for  00 minutes including:    neuromuscular re-education activities to improve: Balance, Proprioception, Posture, and muscles motor control for 40 minutes. The following activities were included:  UPPER TRAP stretching 3 x 30"  Seated chin tucks x 20  Scapular retractions 20x  No money with R TB 2 x 10  Thoracic stretch over chair 10x 10sec  Supine thoracic stretch w/ towel 3 mins  Supine pec stretch w/ towel 3 mins  Scapular rows 2 x 10 R TB  Shoulder extension 2 x 10 R TB      received the following manual therapy techniques: " Soft tissue Mobilization were applied to the: cervical spine for 15 minutes, including:  Manual cervical distraction  L side bend  L UT STM    PATIENT EDUCATION AND HOME EXERCISES     Home Exercises Provided and Patient Education Provided     Education provided:   Educated pt on the importance of daily stretch to increase the benefit of therapy and positive results.      Written Home Exercises Provided: Patient instructed to cont prior HEP. Exercises were reviewed and Karmen was able to demonstrate them prior to the end of the session.  Karmen demonstrated good  understanding of the education provided. See EMR under Patient Instructions for exercises provided during therapy sessions    ASSESSMENT   Cont scapular/postural NM re-education with education on importance and cueing tech and form.  Pt did have less pain post session. Pt does have rounded shoulders and educated her on the importance of more upright posture standing and sitting. Pt feels once she understands the HEP fully she would like to be d/c from PT and cont HEP at home.     Karmen Is progressing well towards her goals.   Pt prognosis is Good.     Pt will continue to benefit from skilled outpatient physical therapy to address the deficits listed in the problem list box on initial evaluation, provide pt/family education and to maximize pt's level of independence in the home and community environment.     Pt's spiritual, cultural and educational needs considered and pt agreeable to plan of care and goals.     Anticipated barriers to physical therapy: none    Goals:  Short Term Goals: 2 weeks   Pt will demo Left cervical spine lat flexion strength 4/5 for improving pain.  Pt will demo bilateral shoulder flexion to 4-/5 or greater for improving strength.     Long Term Goals: 4 weeks   FOTO score improved to 70 for improving QOL.    PLAN     Plan of care Certification: 9/23/2024 to 10/25/2024.     Outpatient Physical Therapy 1-2 times weekly for 4  weeks to include the following interventions: Cervical/Lumbar Traction, Electrical Stimulation IFC, Manual Therapy, Moist Heat/ Ice, Neuromuscular Re-ed, Patient Education, Therapeutic Activities, and Therapeutic Exercise.     Maddy Mcclendon, PTA

## 2024-10-04 ENCOUNTER — CLINICAL SUPPORT (OUTPATIENT)
Dept: REHABILITATION | Facility: HOSPITAL | Age: 74
End: 2024-10-04
Payer: MEDICARE

## 2024-10-04 DIAGNOSIS — R41.840 CONCENTRATION DEFICIT: Primary | ICD-10-CM

## 2024-10-04 PROCEDURE — 97112 NEUROMUSCULAR REEDUCATION: CPT | Mod: PO,CQ

## 2024-10-04 NOTE — PROGRESS NOTES
"    [facility]   Physical Therapy Treatment Note     Name: Rosalia Fernández  Clinic Number: [id number]    Therapy Diagnosis:   Encounter Diagnosis   Name Primary?    Concentration deficit Yes       Physician: Sayda Barnhart MD    Visit Date: 10/4/2024    Physician: Sayda Barnhart MD     Physician Orders: PT Eval and Treat   Post Surgical?No Eval and TreatYes Type of TherapyOutpatient Therapy Location:Neck  Medical Diagnosis from Referral: M54.2 (ICD-10-CM) - Neck pain  Evaluation Date: 9/23/2024  Authorization Period Expiration: 12/31/2024  Plan of Care Expiration: 10/25/2024  Progress Note Due: 10/22/2024  Date of Surgery: na  Visit # / Visits authorized: 2/ 12   FOTO: 1/ 3 (9/23/2024)     Precautions: Standard and OA, CA      Time In: 9:00 am  Time Out: 9:45 am  Total Billable Time: 30 minutes (1:1 9:00-9:30)         SUBJECTIVE     Pt reports: her neck pain in better, she was able to golf without pain.  She was compliant with home exercise program.  Response to previous treatment: No change   Functional change: played golf without neck pain.    Pain: 1/10  Location: left neck      OBJECTIVE     Objective Measures updated at progress report unless specified.       TREATMENT     Karmen received the treatments listed below:        therapeutic activities to improve functional performance and functional mobility for 00  minutes, including:      received therapeutic exercises to develop strength and ROM for  00 minutes including:    neuromuscular re-education activities to improve: Balance, Proprioception, Posture, and muscles motor control for 40 minutes. The following activities were included:  UPPER TRAP stretching 3 x 30"  Seated chin tucks x 20  Scapular retractions 20x  No money with R TB 2 x 10  Thoracic stretch over chair 10x 10sec  Supine thoracic stretch w/ towel 3 mins  Supine pec stretch w/ towel 3 mins  Scapular rows 2 x 10 R TB  Shoulder extension 2 x 10 R " TB  Standing T R TB 2 x 10    received the following manual therapy techniques: Soft tissue Mobilization were applied to the: cervical spine for 15 minutes, including:  Manual cervical distraction  L side bend  L UT STM    PATIENT EDUCATION AND HOME EXERCISES     Home Exercises Provided and Patient Education Provided     Education provided:   Educated pt on the importance of daily stretch to increase the benefit of therapy and positive results.      Written Home Exercises Provided: Patient instructed to cont prior HEP. Exercises were reviewed and Karmen was able to demonstrate them prior to the end of the session.  Karmen demonstrated good  understanding of the education provided. See EMR under Patient Instructions for exercises provided during therapy sessions    ASSESSMENT   Cont scapular/postural NM re-education with education on importance and cueing tech and form. Pt with L UT trigger point area tended to the touch.  Overall, pt starting to feel better and has been complaint with her HEP.     Karmen Is progressing well towards her goals.   Pt prognosis is Good.     Pt will continue to benefit from skilled outpatient physical therapy to address the deficits listed in the problem list box on initial evaluation, provide pt/family education and to maximize pt's level of independence in the home and community environment.     Pt's spiritual, cultural and educational needs considered and pt agreeable to plan of care and goals.     Anticipated barriers to physical therapy: none    Goals:  Short Term Goals: 2 weeks   Pt will demo Left cervical spine lat flexion strength 4/5 for improving pain.  Pt will demo bilateral shoulder flexion to 4-/5 or greater for improving strength.     Long Term Goals: 4 weeks   FOTO score improved to 70 for improving QOL.    PLAN     Plan of care Certification: 9/23/2024 to 10/25/2024.     Outpatient Physical Therapy 1-2 times weekly for 4 weeks to include the following interventions:  Cervical/Lumbar Traction, Electrical Stimulation IFC, Manual Therapy, Moist Heat/ Ice, Neuromuscular Re-ed, Patient Education, Therapeutic Activities, and Therapeutic Exercise.     Maddy Mcclendon, PTA

## 2024-10-11 ENCOUNTER — CLINICAL SUPPORT (OUTPATIENT)
Dept: REHABILITATION | Facility: HOSPITAL | Age: 74
End: 2024-10-11
Payer: MEDICARE

## 2024-10-11 DIAGNOSIS — R41.840 CONCENTRATION DEFICIT: Primary | ICD-10-CM

## 2024-10-11 PROCEDURE — 97112 NEUROMUSCULAR REEDUCATION: CPT | Mod: PO,CQ

## 2024-10-11 PROCEDURE — 97140 MANUAL THERAPY 1/> REGIONS: CPT | Mod: PO,CQ

## 2024-10-11 NOTE — PROGRESS NOTES
"      [facility]   Physical Therapy Treatment Note     Name: Rosalia Fernández  Clinic Number: [id number]    Therapy Diagnosis:   Encounter Diagnosis   Name Primary?    Concentration deficit Yes         Physician: Sayda Barnhart MD    Visit Date: 10/11/2024    Physician: Sayda Barnhart MD     Physician Orders: PT Eval and Treat   Post Surgical?No Eval and TreatYes Type of TherapyOutpatient Therapy Location:Neck  Medical Diagnosis from Referral: M54.2 (ICD-10-CM) - Neck pain  Evaluation Date: 9/23/2024  Authorization Period Expiration: 12/31/2024  Plan of Care Expiration: 10/25/2024  Progress Note Due: 10/22/2024  Date of Surgery: na  Visit # / Visits authorized: 3/ 12   FOTO: 1/ 3 (9/23/2024)     Precautions: Standard and OA, CA      Time In: 10:00 am  Time Out:10:45 am  Total Billable Time: 45 minutes          SUBJECTIVE     Pt reports: her neck pain in better, but she did c/o L UT pain and she thinks it's from the covid shot.  She was compliant with home exercise program.  Response to previous treatment: No change   Functional change: played golf without neck pain.    Pain: 2/10  Location: left neck      OBJECTIVE     Objective Measures updated at progress report unless specified.       TREATMENT     Karmen received the treatments listed below:        therapeutic activities to improve functional performance and functional mobility for 00  minutes, including:      received therapeutic exercises to develop strength and ROM for  00 minutes including:    neuromuscular re-education activities to improve: Balance, Proprioception, Posture, and muscles motor control for 30 minutes. The following activities were included:  UPPER TRAP stretching 3 x 30"  Seated chin tucks x 20  Scapular retractions 20x  No money with R TB 2 x 10  Thoracic stretch over chair 10x 10sec  Supine thoracic stretch w/ towel 3 mins-NP  Supine pec stretch w/ towel 3 mins-NP  Scapular rows 2 x 10 G " TB  Shoulder extension 2 x 10 R TB  Standing T R TB 2 x 10    received the following manual therapy techniques: Soft tissue Mobilization were applied to the: cervical spine for 15 minutes, including:  Manual cervical distraction  L side bend  L UT STM    PATIENT EDUCATION AND HOME EXERCISES     Home Exercises Provided and Patient Education Provided     Education provided:   Educated pt on the importance of daily stretch to increase the benefit of therapy and positive results.      Written Home Exercises Provided: Patient instructed to cont prior HEP. Exercises were reviewed and Karmen was able to demonstrate them prior to the end of the session.  Karmen demonstrated good  understanding of the education provided. See EMR under Patient Instructions for exercises provided during therapy sessions    ASSESSMENT   Cont scapular/postural  and NM re-education scapular strengthening with good tolerance. Pt onts to have L UT trigger point area that was decreased by self stretch and manual STM from PTA.  Overall, pt starting to feel better and has been complaint with her HEP.     Karmen Is progressing well towards her goals.   Pt prognosis is Good.     Pt will continue to benefit from skilled outpatient physical therapy to address the deficits listed in the problem list box on initial evaluation, provide pt/family education and to maximize pt's level of independence in the home and community environment.     Pt's spiritual, cultural and educational needs considered and pt agreeable to plan of care and goals.     Anticipated barriers to physical therapy: none    Goals:  Short Term Goals: 2 weeks   Pt will demo Left cervical spine lat flexion strength 4/5 for improving pain.  Pt will demo bilateral shoulder flexion to 4-/5 or greater for improving strength.     Long Term Goals: 4 weeks   FOTO score improved to 70 for improving QOL.    PLAN     Plan of care Certification: 9/23/2024 to 10/25/2024.     Outpatient Physical  Therapy 1-2 times weekly for 4 weeks to include the following interventions: Cervical/Lumbar Traction, Electrical Stimulation IFC, Manual Therapy, Moist Heat/ Ice, Neuromuscular Re-ed, Patient Education, Therapeutic Activities, and Therapeutic Exercise.     Maddy Mcclendon, PTA

## 2024-10-14 ENCOUNTER — TELEPHONE (OUTPATIENT)
Dept: PRIMARY CARE CLINIC | Facility: CLINIC | Age: 74
End: 2024-10-14
Payer: MEDICARE

## 2024-10-14 NOTE — TELEPHONE ENCOUNTER
Nope that's it. Antiviral. Pain control. Theres also an over the counter cream called capsaicin cream .

## 2024-10-14 NOTE — TELEPHONE ENCOUNTER
----- Message from Kelsi sent at 10/14/2024  8:06 AM CDT -----  Regarding: pt advice - shingles  [phone] - call back; she was diagnosed with shingles , her co [employer] member advise her to proceed to the urgent care ; she was given valacycfovir 1 gm  1 table 2 x daily for 10 days and using [facility] hydrocortisone. Asking if there is anything to remedy or if she needs to be seen.      Kelsi

## 2024-10-14 NOTE — TELEPHONE ENCOUNTER
Patient was dx with shingles yesterday at [facility] where she was prescribed valacyclovir BID x 10 days. She also picked up some hydrocortisone that she is using on the site. She is calling to ask if there is anything else she could be doing for the itching and discomfort.

## 2024-10-18 ENCOUNTER — CLINICAL SUPPORT (OUTPATIENT)
Dept: REHABILITATION | Facility: HOSPITAL | Age: 74
End: 2024-10-18
Payer: MEDICARE

## 2024-10-18 DIAGNOSIS — R41.840 CONCENTRATION DEFICIT: Primary | ICD-10-CM

## 2024-10-18 PROCEDURE — 97110 THERAPEUTIC EXERCISES: CPT | Mod: PO,CQ

## 2024-10-18 PROCEDURE — 97112 NEUROMUSCULAR REEDUCATION: CPT | Mod: PO,CQ

## 2024-10-18 NOTE — PROGRESS NOTES
"        [facility]   Physical Therapy Treatment Note     Name: Rosalia Fernández  Clinic Number: [id number]    Therapy Diagnosis:   Encounter Diagnosis   Name Primary?    Concentration deficit Yes           Physician: Sayda Barnhart MD    Visit Date: 10/18/2024    Physician: Sayda Barnhart MD     Physician Orders: PT Eval and Treat   Post Surgical?No Eval and TreatYes Type of TherapyOutpatient Therapy Location:Neck  Medical Diagnosis from Referral: M54.2 (ICD-10-CM) - Neck pain  Evaluation Date: 9/23/2024  Authorization Period Expiration: 12/31/2024  Plan of Care Expiration: 10/25/2024  Progress Note Due: 10/22/2024  Date of Surgery: na  Visit # / Visits authorized: 4/ 12   FOTO: 1/ 3 (9/23/2024)     Precautions: Standard and OA, [place]      Time In: 1:30 pm  Time Out:1:25 am  Total Billable Time: 45 minutes          SUBJECTIVE     Pt reports: her neck pain in better, but she did c/o L UT pain and she thinks it's from the covid shot.  She was compliant with home exercise program.  Response to previous treatment: No change   Functional change: played golf without neck pain.    Pain: 2/10  Location: left neck      OBJECTIVE     Objective Measures updated at progress report unless specified.       TREATMENT     Karmen received the treatments listed below:        therapeutic activities to improve functional performance and functional mobility for 00  minutes, including:      received therapeutic exercises to develop strength and ROM for  00 minutes including:    neuromuscular re-education activities to improve: Balance, Proprioception, Posture, and muscles motor control for 45 minutes. The following activities were included:  UPPER TRAP stretching 3 x 30"  Seated chin tucks x 20  Scapular retractions 20x  No money with R TB 2 x 10  Thoracic stretch over chair 10x 10sec  Supine thoracic stretch w/ towel 3 mins-NP  Supine pec stretch w/ towel 3 mins-NP  Scapular rows 2 x 10 G " TB  Shoulder extension 2 x 10 R TB  Standing T R TB 2 x 10    received the following manual therapy techniques: Soft tissue Mobilization were applied to the: cervical spine for 15 minutes, including:  Manual cervical distraction-NP  L side bend-NP  L UT STM seated    PATIENT EDUCATION AND HOME EXERCISES     Home Exercises Provided and Patient Education Provided     Education provided:   Educated pt on the importance of daily stretch to increase the benefit of therapy and positive results.      Written Home Exercises Provided: Patient instructed to cont prior HEP. Exercises were reviewed and Karmen was able to demonstrate them prior to the end of the session.  Karmen demonstrated good  understanding of the education provided. See EMR under Patient Instructions for exercises provided during therapy sessions    ASSESSMENT   Cont scapular/postural and NM re-education scapular strengthening with good tolerance. Pt symptoms always feels better post session, but returns back to clinic with pain. Pt does have forward neck and head that is possibly contributing to pain. Pt with L tender UT trigger point area that was decreased by self stretch and manual STM from PTA.  Overall, pt starting to feel better and has been complaint with her HEP. Pt would cont to stay in PT for a little longer. If trigger point area does not improve she was thinking on possibly trying FDN.     Karmen Is progressing well towards her goals.   Pt prognosis is Good.     Pt will continue to benefit from skilled outpatient physical therapy to address the deficits listed in the problem list box on initial evaluation, provide pt/family education and to maximize pt's level of independence in the home and community environment.     Pt's spiritual, cultural and educational needs considered and pt agreeable to plan of care and goals.     Anticipated barriers to physical therapy: none    Goals:  Short Term Goals: 2 weeks   Pt will demo Left cervical spine  lat flexion strength 4/5 for improving pain.  Pt will demo bilateral shoulder flexion to 4-/5 or greater for improving strength.     Long Term Goals: 4 weeks   FOTO score improved to 70 for improving QOL.    PLAN     Plan of care Certification: 9/23/2024 to 10/25/2024.     Outpatient Physical Therapy 1-2 times weekly for 4 weeks to include the following interventions: Cervical/Lumbar Traction, Electrical Stimulation IFC, Manual Therapy, Moist Heat/ Ice, Neuromuscular Re-ed, Patient Education, Therapeutic Activities, and Therapeutic Exercise.     Maddy Mcclendon, PTA

## 2024-10-24 ENCOUNTER — TELEPHONE (OUTPATIENT)
Dept: PODIATRY | Facility: CLINIC | Age: 74
End: 2024-10-24
Payer: MEDICARE

## 2024-10-24 NOTE — TELEPHONE ENCOUNTER
Spoke with the patient to provide a number to [facility] for nail care because the providers are not accepting new nail care patients at this time. She expressed understanding of the message. Her appointment was cancelled.

## 2024-10-24 NOTE — TELEPHONE ENCOUNTER
----- Message from Paloma sent at 10/24/2024  7:56 AM CDT -----  Regarding: self    Type: Patient Call Back     Who called:self     What is the request in detail:pt is asking about her appt on 10/28 she is stating she has plans on 10/30 and wants to make sure what she has done at the appt will not cause discomfort. Please call to advise     Can the clinic reply by [facility]? No     Would the patient rather a call back or a response via [facility]? Call back     Best call back number: [phone]       Additional Information:     Thank you.

## 2024-10-29 ENCOUNTER — CLINICAL SUPPORT (OUTPATIENT)
Dept: REHABILITATION | Facility: HOSPITAL | Age: 74
End: 2024-10-29
Payer: MEDICARE

## 2024-10-29 DIAGNOSIS — M54.2 NECK PAIN: ICD-10-CM

## 2024-10-29 DIAGNOSIS — R41.840 CONCENTRATION DEFICIT: Primary | ICD-10-CM

## 2024-10-29 PROCEDURE — 97140 MANUAL THERAPY 1/> REGIONS: CPT | Mod: PO | Performed by: PHYSICAL THERAPIST

## 2024-10-29 PROCEDURE — 97112 NEUROMUSCULAR REEDUCATION: CPT | Mod: PO | Performed by: PHYSICAL THERAPIST

## 2024-11-05 ENCOUNTER — CLINICAL SUPPORT (OUTPATIENT)
Dept: REHABILITATION | Facility: HOSPITAL | Age: 74
End: 2024-11-05
Payer: MEDICARE

## 2024-11-05 DIAGNOSIS — R41.840 CONCENTRATION DEFICIT: Primary | ICD-10-CM

## 2024-11-05 PROCEDURE — 97112 NEUROMUSCULAR REEDUCATION: CPT | Mod: KX,PO | Performed by: PHYSICAL THERAPIST

## 2024-11-05 NOTE — PROGRESS NOTES
[facility]   Reassessment: Physical Therapy Treatment Note     Name: Rosalia Fernández  Clinic Number: [id number]    Therapy Diagnosis:   Encounter Diagnosis   Name Primary?    Concentration deficit Yes     Physician: Sayda Barnhart MD    Visit Date: 11/5/2024    Physician: Sayda Barnhart MD     Physician Orders: PT Eval and Treat   Post Surgical?No Eval and TreatYes Type of TherapyOutpatient Therapy Location:Neck  Medical Diagnosis from Referral: M54.2 (ICD-10-CM) - Neck pain  Evaluation Date: 9/23/2024  Authorization Period Expiration: 12/31/2024  Plan of Care Expiration: 10/25/2024, 11/29/2024  Progress Note Due: 10/22/2024, 11/29/2024  Date of Surgery: na  Visit # / Visits authorized: 5/ 12   FOTO: 2/ 3 (9/23/2024)     Precautions: Standard and OA, CA      Time In: 10:00  Time Out: 10:40  Total Billable Time: 40 minutes      SUBJECTIVE     Pt reports: the pain is gone, but she just feels tight.  She was compliant with home exercise program.  Response to previous treatment: No change   Functional change: played golf without neck pain.    Pain: 0/10 pre tx, 0/10 pain post tx  Location: left neck      OBJECTIVE     Objective Measures updated at progress report unless specified.     Observation: FHP, rounded shoulders     Posture: fair     Cervical Range of Motion:     Degrees Pain   Flexion 60 -      Extension 60 -      Right Rotation 55 -      Left Rotation 50 -      Right Side Bending 60 -   Left Side Bending 50 -      Shoulder Range of Motion:   Shoulder Left Right   Flexion wnl wnl   Abduction wnl wnl   ER wnl wnl   IR wnl wnl      Strength:  Cervical MMT   Flexion 4/5   Extension 5/5   Right Side Bend 4/5   Left Side Bend 3+/5      Upper Extremity Strength  (R) UE   (L) UE     Shoulder flexion: 3+/5 Shoulder flexion: 3+/5   Shoulder Abduction: 4/5 Shoulder abduction: 4/5   Shoulder ER 4/5 Shoulder ER 4/5   Shoulder IR 4/5 Shoulder IR 4/5   Rhomboids 4/5 Rhomboids 4/5  "     Special Tests:  Distraction -   Compression -   Spurlings -      Palpation: pos Left UT , neg post DN     Sensation: intact bilateral UEs     Intake Outcome Measure for FOTO NECK Survey     Therapist reviewed FOTO scores for Rosalia Fernández on 9/23/2024.   FOTO report - see Media section or FOTO account episode details.     Intake Score: 65  Goal: 70    10/29/2024: 69 (+4)     TREATMENT     Karmen received the treatments listed below:      therapeutic activities to improve functional performance and functional mobility for 00 minutes, including:    received therapeutic exercises to develop strength and ROM for 00 minutes including:    neuromuscular re-education activities to improve: Balance, Proprioception, Posture, and muscles motor control for 40 minutes. The following activities were included:  UPPER TRAP stretching 3 x 30"  Levator Scapulae stretch 3 x 30"  Supine chin nod 2 x 10, 3" holds  Supine head lift AA with PT x 10    Education in front mirror related to amy-line, core activation, retractor activation x 10 mins.    Rows rtb 2 x 10, using mirror  Shoulder extension rtb 2 x 10  No money rtb 2 x 10, using mirror    Ts at wall for cues with mirror in front 2 x 10  Wall angels against wall with mirror in front 2 x 10    Seated flexed posture to extended posture x 10    received the following manual therapy techniques: Soft tissue Mobilization were applied to the: cervical spine for 00 minutes, including:  Discussed the purpose, mechanism, and indications for dry needling with Karmen . Patient was cleared of all precautions and contraindications and pt signed written consent and gave verbal consent to dry needling Rx today.   Palpation used to determine dry needling sites. Increased tone noted at Left UPPER TRAP and Levator Scapulae Scapulae. Pt rec'd dry needling in prone and s/l positions to Left UPPER TRAP and Levator Scapulae Scapulae with 0.40 mm needles.  Pt tolerated treatment well and " was not in any distress at the completion of treatment.      PATIENT EDUCATION AND HOME EXERCISES     Home Exercises Provided and Patient Education Provided     Education provided:   Educated pt on the importance of daily stretch to increase the benefit of therapy and positive results.      Written Home Exercises Provided: Patient instructed to cont prior HEP. Exercises were reviewed and Karmen was able to demonstrate them prior to the end of the session.  Karmen demonstrated good  understanding of the education provided. See EMR under Patient Instructions for exercises provided during therapy sessions    ASSESSMENT   Cont scapular/postural and NM re-education scapular strengthening with good tolerance. Pt does have forward neck and head that is contributing to neck pain with eccentric muscle fatigue. Additional education provided today using mirror and amy line examples. Pt is not using core properly for support and demos standard upper and lower cross syndromes. + band ex to improve this. Pt would cont to stay in PT for a little longer. Pt agreeable to DN and felt better with pn resolved. Education given in postural correction again. Recommending a few visits and likely d/c to HEP.    Karmen Is progressing well towards her goals.   Pt prognosis is Good.     Pt will continue to benefit from skilled outpatient physical therapy to address the deficits listed in the problem list box on initial evaluation, provide pt/family education and to maximize pt's level of independence in the home and community environment.     Pt's spiritual, cultural and educational needs considered and pt agreeable to plan of care and goals.     Anticipated barriers to physical therapy: none    Goals:  Short Term Goals: 2 weeks   Pt will demo Left cervical spine lat flexion strength 4/5 for improving pain.-ongoing  Pt will demo bilateral shoulder flexion to 4-/5 or greater for improving strength.-ongoing     Long Term Goals: 4 weeks    FOTO score improved to 70 for improving QOL.-ongoing    PLAN     Plan of care Certification: 10/25/2024 to 11/29/2024.     Outpatient Physical Therapy 1-2 times weekly for 4 weeks to include the following interventions: Cervical/Lumbar Traction, Electrical Stimulation IFC, Manual Therapy, Moist Heat/ Ice, Neuromuscular Re-ed, Patient Education, Therapeutic Activities, and Therapeutic Exercise.     Lary Martinez, PT

## 2024-11-11 ENCOUNTER — CLINICAL SUPPORT (OUTPATIENT)
Dept: REHABILITATION | Facility: HOSPITAL | Age: 74
End: 2024-11-11
Payer: MEDICARE

## 2024-11-11 DIAGNOSIS — R41.840 CONCENTRATION DEFICIT: Primary | ICD-10-CM

## 2024-11-11 NOTE — PROGRESS NOTES
[facility]   Reassessment: Physical Therapy Treatment Note     Name: Rosalia Fernández  Clinic Number: [id number]    Therapy Diagnosis:   Encounter Diagnosis   Name Primary?    Concentration deficit Yes     Physician: Sayda Barnhart MD    Visit Date: 11/11/2024    Physician: Sayda Barnhart MD     Physician Orders: PT Eval and Treat   Post Surgical?No Eval and TreatYes Type of TherapyOutpatient Therapy Location:Neck  Medical Diagnosis from Referral: M54.2 (ICD-10-CM) - Neck pain  Evaluation Date: 9/23/2024  Authorization Period Expiration: 12/31/2024  Plan of Care Expiration: 10/25/2024, 11/29/2024  Progress Note Due: 10/22/2024, 11/29/2024  Date of Surgery: na  Visit # / Visits authorized: 6/ 12   FOTO: 2/ 3 (9/23/2024, 10/29/2024)     Precautions: Standard and OA, CA      Time In: 10:00  Time Out: 10:40  Total Billable Time: 30 minutes      SUBJECTIVE     Pt reports: the pain is gone, but she just feels tight. She received a mammogram and her neck had to turn in different directions resulting in pain again.  She was compliant with home exercise program.  Response to previous treatment: No change   Functional change: played golf without neck pain.    Pain: 1/10 pre tx, 0/10 pain post tx  Location: left neck      OBJECTIVE     Objective Measures updated at progress report unless specified.     Observation: FHP, rounded shoulders     Posture: fair     Cervical Range of Motion:     Degrees Pain   Flexion 60 -      Extension 60 -      Right Rotation 55 -      Left Rotation 50 -      Right Side Bending 60 -   Left Side Bending 50 -      Shoulder Range of Motion:   Shoulder Left Right   Flexion wnl wnl   Abduction wnl wnl   ER wnl wnl   IR wnl wnl      Strength:  Cervical MMT   Flexion 4/5   Extension 5/5   Right Side Bend 4/5   Left Side Bend 3+/5      Upper Extremity Strength  (R) UE   (L) UE     Shoulder flexion: 3+/5 Shoulder flexion: 3+/5   Shoulder Abduction: 4/5 Shoulder  "abduction: 4/5   Shoulder ER 4/5 Shoulder ER 4/5   Shoulder IR 4/5 Shoulder IR 4/5   Rhomboids 4/5 Rhomboids 4/5      Special Tests:  Distraction -   Compression -   Spurlings -      Palpation: pos Left UT , neg post DN     Sensation: intact bilateral UEs     Intake Outcome Measure for FOTO NECK Survey     Therapist reviewed FOTO scores for Rosalia Fernández on 9/23/2024.   FOTO report - see Media section or FOTO account episode details.     Intake Score: 65  Goal: 70    10/29/2024: 69 (+4)     TREATMENT     Karmen received the treatments listed below:      therapeutic activities to improve functional performance and functional mobility for 00 minutes, including:    received therapeutic exercises to develop strength and ROM for 00 minutes including:    neuromuscular re-education activities to improve: Balance, Proprioception, Posture, and muscles motor control for 25 minutes. The following activities were included:  UPPER TRAP stretching 3 x 30"  Levator Scapulae stretch 3 x 30"  Supine chin nod 2 x 10, 3" holds  Supine head lift AA with PT x 10    Education in front mirror related to amy-line, core activation, retractor activation x 00 mins.    Rows rtb 2 x 10, using mirror  Shoulder extension rtb 2 x 10  No money rtb 2 x 10, using mirror    Ts at wall for cues with mirror in front 2 x 10  Wall angels against wall with mirror in front 2 x 10    Seated flexed posture to extended posture x 10    received the following manual therapy techniques: Soft tissue Mobilization were applied to the: cervical spine for 15 minutes, including:  Left UPPER TRAP STM  Left and right manual up slides and down slides grade I/II    PATIENT EDUCATION AND HOME EXERCISES     Home Exercises Provided and Patient Education Provided     Education provided:   Educated pt on the importance of daily stretch to increase the benefit of therapy and positive results.      Written Home Exercises Provided: Patient instructed to cont prior HEP. " Exercises were reviewed and Karmen was able to demonstrate them prior to the end of the session.  Karmen demonstrated good  understanding of the education provided. See EMR under Patient Instructions for exercises provided during therapy sessions    ASSESSMENT   Cont scapular/postural and NM re-education scapular strengthening with good tolerance. Pt does have forward neck and head that is contributing to neck pain with eccentric muscle fatigue. Pt reporting sx subsided following MT. + Left UPPER TRAP ttp and bilateral c/s mobility.    Karmen Is progressing well towards her goals.   Pt prognosis is Good.     Pt will continue to benefit from skilled outpatient physical therapy to address the deficits listed in the problem list box on initial evaluation, provide pt/family education and to maximize pt's level of independence in the home and community environment.     Pt's spiritual, cultural and educational needs considered and pt agreeable to plan of care and goals.     Anticipated barriers to physical therapy: none    Goals:  Short Term Goals: 2 weeks   Pt will demo Left cervical spine lat flexion strength 4/5 for improving pain.-ongoing  Pt will demo bilateral shoulder flexion to 4-/5 or greater for improving strength.-ongoing     Long Term Goals: 4 weeks   FOTO score improved to 70 for improving QOL.-ongoing    PLAN     Plan of care Certification: 10/25/2024 to 11/29/2024.     Outpatient Physical Therapy 1-2 times weekly for 4 weeks to include the following interventions: Cervical/Lumbar Traction, Electrical Stimulation IFC, Manual Therapy, Moist Heat/ Ice, Neuromuscular Re-ed, Patient Education, Therapeutic Activities, and Therapeutic Exercise.     Lary Martinez, PT

## 2024-11-15 ENCOUNTER — CLINICAL SUPPORT (OUTPATIENT)
Dept: REHABILITATION | Facility: HOSPITAL | Age: 74
End: 2024-11-15
Payer: MEDICARE

## 2024-11-15 DIAGNOSIS — R41.840 CONCENTRATION DEFICIT: Primary | ICD-10-CM

## 2024-11-15 PROCEDURE — 97140 MANUAL THERAPY 1/> REGIONS: CPT | Mod: KX,PO | Performed by: PHYSICAL THERAPIST

## 2024-11-15 PROCEDURE — 97110 THERAPEUTIC EXERCISES: CPT | Mod: KX,PO | Performed by: PHYSICAL THERAPIST

## 2024-11-15 NOTE — PROGRESS NOTES
[facility]    Physical Therapy Treatment Note     Name: Rosalia Fernández  Clinic Number: [id number]    Therapy Diagnosis:   Encounter Diagnosis   Name Primary?    Concentration deficit Yes     Physician: Sayda Barnhart MD    Visit Date: 11/15/2024    Physician: Sayda Barnhart MD     Physician Orders: PT Eval and Treat   Post Surgical?No Eval and TreatYes Type of TherapyOutpatient Therapy Location:Neck  Medical Diagnosis from Referral: M54.2 (ICD-10-CM) - Neck pain  Evaluation Date: 9/23/2024  Authorization Period Expiration: 12/31/2024  Plan of Care Expiration: 10/25/2024, 11/29/2024  Progress Note Due: 10/22/2024, 11/29/2024  Date of Surgery: na  Visit # / Visits authorized: 7/ 12   FOTO: 2/ 3 (9/23/2024, 10/29/2024)     Precautions: Standard and OA, CA      Time In: 10:00  Time Out: 10:45  Total Billable Time: 40 minutes      SUBJECTIVE     Pt reports: she does not have pain, but would like one more visit next week to make sure pain does not return.  She was compliant with home exercise program.  Response to previous treatment: improving pain  Functional change: played golf without neck pain.    Pain: 0/10 pre tx, 0/10 pain post tx  Location: left neck      OBJECTIVE     Objective Measures updated at progress report unless specified.     Observation: FHP, rounded shoulders     Posture: fair     Cervical Range of Motion:     Degrees Pain   Flexion 60 -      Extension 60 -      Right Rotation 55 -      Left Rotation 50 -      Right Side Bending 60 -   Left Side Bending 50 -      Shoulder Range of Motion:   Shoulder Left Right   Flexion wnl wnl   Abduction wnl wnl   ER wnl wnl   IR wnl wnl      Strength:  Cervical MMT   Flexion 4/5   Extension 5/5   Right Side Bend 4/5   Left Side Bend 3+/5      Upper Extremity Strength  (R) UE   (L) UE     Shoulder flexion: 3+/5 Shoulder flexion: 3+/5   Shoulder Abduction: 4/5 Shoulder abduction: 4/5   Shoulder ER 4/5 Shoulder ER 4/5  "  Shoulder IR 4/5 Shoulder IR 4/5   Rhomboids 4/5 Rhomboids 4/5      Special Tests:  Distraction -   Compression -   Spurlings -      Palpation: pos Left UT , neg post DN     Sensation: intact bilateral UEs     Intake Outcome Measure for FOTO NECK Survey     Therapist reviewed FOTO scores for Rosalia Fernández on 9/23/2024.   FOTO report - see Media section or FOTO account episode details.     Intake Score: 65  Goal: 70    10/29/2024: 69 (+4)     TREATMENT     Karmen received the treatments listed below:      therapeutic activities to improve functional performance and functional mobility for 00 minutes, including:    received therapeutic exercises to develop strength and ROM for 00 minutes including:    neuromuscular re-education activities to improve: Balance, Proprioception, Posture, and muscles motor control for 25 minutes. The following activities were included:  UPPER TRAP stretching 3 x 30"  Levator Scapulae stretch 3 x 30"  Supine chin nod 2 x 10, 3" holds  Supine head lift AA with PT x 10    Education in front mirror related to amy-line, core activation, retractor activation x 00 mins.    Rows gtb 3 x 10, using mirror  Shoulder extension rtb g x 10  No money gtb 3x 10, using mirror    Ts at wall for cues with mirror in front 3 x 10  Wall angels against wall with mirror in front 2 x 10    Seated flexed posture to extended posture x 10    received the following manual therapy techniques: Soft tissue Mobilization were applied to the: cervical spine for 15 minutes, including:  Left UPPER TRAP STM-np  Left and right manual up slides and down slides grade I/II    PATIENT EDUCATION AND HOME EXERCISES     Home Exercises Provided and Patient Education Provided     Education provided:   Educated pt on the importance of daily stretch to increase the benefit of therapy and positive results.      Written Home Exercises Provided: Patient instructed to cont prior HEP. Exercises were reviewed and Karmen was able to " demonstrate them prior to the end of the session.  Karmen demonstrated good  understanding of the education provided. See EMR under Patient Instructions for exercises provided during therapy sessions    ASSESSMENT   Cont scapular/postural and NM re-education scapular strengthening with good tolerance. End range c/s rot appears improved after MT. She feels better since doing PT. She feels she wants to see how she feels going into the weekend.    Karmen Is progressing well towards her goals.   Pt prognosis is Good.     Pt will continue to benefit from skilled outpatient physical therapy to address the deficits listed in the problem list box on initial evaluation, provide pt/family education and to maximize pt's level of independence in the home and community environment.     Pt's spiritual, cultural and educational needs considered and pt agreeable to plan of care and goals.     Anticipated barriers to physical therapy: none    Goals:  Short Term Goals: 2 weeks   Pt will demo Left cervical spine lat flexion strength 4/5 for improving pain.-ongoing  Pt will demo bilateral shoulder flexion to 4-/5 or greater for improving strength.-ongoing     Long Term Goals: 4 weeks   FOTO score improved to 70 for improving QOL.-ongoing    PLAN     Plan of care Certification: 10/25/2024 to 11/29/2024.     Outpatient Physical Therapy 1-2 times weekly for 4 weeks to include the following interventions: Cervical/Lumbar Traction, Electrical Stimulation IFC, Manual Therapy, Moist Heat/ Ice, Neuromuscular Re-ed, Patient Education, Therapeutic Activities, and Therapeutic Exercise.     Lary Martinez, PT

## 2024-11-19 ENCOUNTER — CLINICAL SUPPORT (OUTPATIENT)
Dept: REHABILITATION | Facility: HOSPITAL | Age: 74
End: 2024-11-19
Payer: MEDICARE

## 2024-11-19 DIAGNOSIS — R41.840 CONCENTRATION DEFICIT: Primary | ICD-10-CM

## 2024-11-19 PROCEDURE — 97112 NEUROMUSCULAR REEDUCATION: CPT | Mod: KX,PO | Performed by: PHYSICAL THERAPIST

## 2024-11-19 NOTE — PROGRESS NOTES
[facility]  PT Discharge Note    Name: Rosalia Fernández  [facility] Number: [id number]    Therapy Diagnosis:   Encounter Diagnosis   Name Primary?    Concentration deficit Yes     Physician: Sayda Barnhart MD    Physician Orders: PT Eval and Treat   Post Surgical?No Eval and TreatYes Type of TherapyOutpatient Therapy Location:Neck  Medical Diagnosis from Referral: M54.2 (ICD-10-CM) - Neck pain  Evaluation Date: 9/23/2024    Date of Last visit: 11/19/2024  Total Visits Received: 7    OSBALDO Peraza reports just feeling that something is there but no pain and much improved since starting PT. She can cont HEP.    Discharge reason: Patient has met all of his/her goals    Discharge FOTO Score: 96 (+31)    Goals: see below    PLAN   This patient is discharged from Physical Therapy.      Lary Martinez, PT      [facility]    Physical Therapy Treatment Note     Name: Rosalia Fernández  [facility] Number: [id number]    Therapy Diagnosis:   Encounter Diagnosis   Name Primary?    Concentration deficit Yes     Physician: Sayda Barnhart MD    Visit Date: 11/19/2024    Physician: Sayda Barnhart MD     Physician Orders: PT Eval and Treat   Post Surgical?No Eval and TreatYes Type of TherapyOutpatient Therapy Location:Neck  Medical Diagnosis from Referral: M54.2 (ICD-10-CM) - Neck pain  Evaluation Date: 9/23/2024  Authorization Period Expiration: 12/31/2024  Plan of Care Expiration: 10/25/2024, 11/29/2024  Progress Note Due: 10/22/2024, 11/29/2024  Date of Surgery: na  Visit # / Visits authorized: 8/ 12   FOTO: 3/ 3 (9/23/2024, 10/29/2024, 11/19/2024)     Precautions: Standard and OA, CA      Time In: 10:00  Time Out: 11:00  Total Billable Time: 30 minutes  (1:1)     SUBJECTIVE     Pt reports: she does not have pain, but some stiffness.  She was compliant with home exercise program.  Response to previous treatment: improving pain  Functional change:  "played golf without neck pain.    Pain: 0/10 pre tx, 0/10 pain post tx  Location: left neck      OBJECTIVE     Objective Measures updated at progress report unless specified.     Observation: FHP, rounded shoulders     Posture: fair     Cervical Range of Motion:     Degrees Pain   Flexion 60 -      Extension 60 -      Right Rotation 55 -      Left Rotation 50 -      Right Side Bending 60 -   Left Side Bending 50 -      Shoulder Range of Motion:   Shoulder Left Right   Flexion wnl wnl   Abduction wnl wnl   ER wnl wnl   IR wnl wnl      Strength:  Cervical MMT   Flexion 4+/5   Extension 5/5   Right Side Bend 4/5   Left Side Bend 4/5      Upper Extremity Strength  (R) UE   (L) UE     Shoulder flexion: 3+/5 Shoulder flexion: 3+/5   Shoulder Abduction: 4/5 Shoulder abduction: 4/5   Shoulder ER 4/5 Shoulder ER 4/5   Shoulder IR 4/5 Shoulder IR 4/5   Rhomboids 4/5 Rhomboids 4/5      Palpation: pos Left UT      Sensation: intact bilateral UEs     Intake Outcome Measure for FOTO NECK Survey     Therapist reviewed FOTO scores for Rosalia Fernández on 9/23/2024.   FOTO report - see Media section or FOTO account episode details.     Intake Score: 65  Goal: 70    10/29/2024: 69 (+4)    11/19/2024: 96 (+31)     TREATMENT     Karmen received the treatments listed below:      therapeutic activities to improve functional performance and functional mobility for 00 minutes, including:    received therapeutic exercises to develop strength and ROM for 00 minutes including:    neuromuscular re-education activities to improve: Balance, Proprioception, Posture, and muscles motor control for 40 minutes. The following activities were included:  UPPER TRAP stretching 3 x 30"  Levator Scapulae stretch 3 x 30"  Supine chin nod 2 x 10, 3" holds    Rows gtb 3 x 10, using mirror  Shoulder extension rtb g x 10  No money gtb 3x 10, using mirror    Ts at wall for cues with mirror in front 3 x 10  Wall angels against wall with mirror in front 2 x " 10    Seated flexed posture to extended posture x 10    REASSESSMENT.    PATIENT EDUCATION AND HOME EXERCISES     Home Exercises Provided and Patient Education Provided     Education provided:   Educated pt on the importance of daily stretch to increase the benefit of therapy and positive results.    D/C planning    Written Home Exercises Provided: yes. Exercises were reviewed and Karmen was able to demonstrate them prior to the end of the session.  Karmen demonstrated good  understanding of the education provided. See EMR under Patient Instructions for exercises provided during therapy sessions    ASSESSMENT   Karmen reports just feeling that something is there but no pain and much improved since starting PT. She can cont HEP.    Goals:  Short Term Goals: 2 weeks   Pt will demo Left cervical spine lat flexion strength 4/5 for improving pain.-ongoing  Pt will demo bilateral shoulder flexion to 4-/5 or greater for improving strength.-ongoing     Long Term Goals: 4 weeks   FOTO score improved to 70 for improving QOL.-ongoing    PLAN     Discharge to [facility].    Lary Martinez, PT

## 2024-11-19 NOTE — PLAN OF CARE
[facility]  PT Discharge Note    Name: Rosalia Fernández  [facility] Number: [id number]    Therapy Diagnosis:   Encounter Diagnosis   Name Primary?    Concentration deficit Yes     Physician: Sayda Barnhart MD    Physician Orders: PT Eval and Treat   Post Surgical?No Eval and TreatYes Type of TherapyOutpatient Therapy Location:Neck  Medical Diagnosis from Referral: M54.2 (ICD-10-CM) - Neck pain  Evaluation Date: 9/23/2024    Date of Last visit: 11/19/2024  Total Visits Received: 7    OSBALDO Peraza reports just feeling that something is there but no pain and much improved since starting PT. She can cont HEP.    Discharge reason: Patient has met all of his/her goals    Discharge FOTO Score: 96 (+31)    Goals: see below    PLAN   This patient is discharged from Physical Therapy.      Lary Martinez, PT      [facility]    Physical Therapy Treatment Note     Name: Rosalia Fernández  [facility] Number: [id number]    Therapy Diagnosis:   Encounter Diagnosis   Name Primary?    Concentration deficit Yes     Physician: Sayda Barnhart MD    Visit Date: 11/19/2024    Physician: Sayda Barnhart MD     Physician Orders: PT Eval and Treat   Post Surgical?No Eval and TreatYes Type of TherapyOutpatient Therapy Location:Neck  Medical Diagnosis from Referral: M54.2 (ICD-10-CM) - Neck pain  Evaluation Date: 9/23/2024  Authorization Period Expiration: 12/31/2024  Plan of Care Expiration: 10/25/2024, 11/29/2024  Progress Note Due: 10/22/2024, 11/29/2024  Date of Surgery: na  Visit # / Visits authorized: 8/ 12   FOTO: 3/ 3 (9/23/2024, 10/29/2024, 11/19/2024)     Precautions: Standard and OA, CA      Time In: 10:00  Time Out: 11:00  Total Billable Time: 30 minutes  (1:1)     SUBJECTIVE     Pt reports: she does not have pain, but some stiffness.  She was compliant with home exercise program.  Response to previous treatment: improving pain  Functional change:  "played golf without neck pain.    Pain: 0/10 pre tx, 0/10 pain post tx  Location: left neck      OBJECTIVE     Objective Measures updated at progress report unless specified.     Observation: FHP, rounded shoulders     Posture: fair     Cervical Range of Motion:     Degrees Pain   Flexion 60 -      Extension 60 -      Right Rotation 55 -      Left Rotation 50 -      Right Side Bending 60 -   Left Side Bending 50 -      Shoulder Range of Motion:   Shoulder Left Right   Flexion wnl wnl   Abduction wnl wnl   ER wnl wnl   IR wnl wnl      Strength:  Cervical MMT   Flexion 4+/5   Extension 5/5   Right Side Bend 4/5   Left Side Bend 4/5      Upper Extremity Strength  (R) UE   (L) UE     Shoulder flexion: 3+/5 Shoulder flexion: 3+/5   Shoulder Abduction: 4/5 Shoulder abduction: 4/5   Shoulder ER 4/5 Shoulder ER 4/5   Shoulder IR 4/5 Shoulder IR 4/5   Rhomboids 4/5 Rhomboids 4/5      Palpation: pos Left UT      Sensation: intact bilateral UEs     Intake Outcome Measure for FOTO NECK Survey     Therapist reviewed FOTO scores for Rosalia Fernández on 9/23/2024.   FOTO report - see Media section or FOTO account episode details.     Intake Score: 65  Goal: 70    10/29/2024: 69 (+4)    11/19/2024: 96 (+31)     TREATMENT     Karmen received the treatments listed below:      therapeutic activities to improve functional performance and functional mobility for 00 minutes, including:    received therapeutic exercises to develop strength and ROM for 00 minutes including:    neuromuscular re-education activities to improve: Balance, Proprioception, Posture, and muscles motor control for 30 minutes. The following activities were included:  UPPER TRAP stretching 3 x 30"  Levator Scapulae stretch 3 x 30"  Supine chin nod 2 x 10, 3" holds    Rows gtb 3 x 10, using mirror  Shoulder extension rtb g x 10  No money gtb 3x 10, using mirror    Ts at wall for cues with mirror in front 3 x 10  Wall angels against wall with mirror in front 2 x " 10    Seated flexed posture to extended posture x 10    PATIENT EDUCATION AND HOME EXERCISES     Home Exercises Provided and Patient Education Provided     Education provided:   Educated pt on the importance of daily stretch to increase the benefit of therapy and positive results.    D/C planning    Written Home Exercises Provided: yes. Exercises were reviewed and Karmen was able to demonstrate them prior to the end of the session.  Karmen demonstrated good  understanding of the education provided. See EMR under Patient Instructions for exercises provided during therapy sessions    ASSESSMENT   Karmen reports just feeling that something is there but no pain and much improved since starting PT. She can cont HEP.    Goals:  Short Term Goals: 2 weeks   Pt will demo Left cervical spine lat flexion strength 4/5 for improving pain.-ongoing  Pt will demo bilateral shoulder flexion to 4-/5 or greater for improving strength.-ongoing     Long Term Goals: 4 weeks   FOTO score improved to 70 for improving QOL.-ongoing    PLAN     Discharge to [facility].

## 2024-12-03 ENCOUNTER — LAB VISIT (OUTPATIENT)
Dept: LAB | Facility: HOSPITAL | Age: 74
End: 2024-12-03
Attending: INTERNAL MEDICINE
Payer: MEDICARE

## 2024-12-03 DIAGNOSIS — E78.2 MIXED HYPERLIPIDEMIA: ICD-10-CM

## 2024-12-03 DIAGNOSIS — R53.83 FATIGUE, UNSPECIFIED TYPE: ICD-10-CM

## 2024-12-03 DIAGNOSIS — R73.03 PREDIABETES: ICD-10-CM

## 2024-12-03 LAB
ALBUMIN SERPL BCP-MCNC: 3.9 G/DL (ref 3.5–5.2)
ALP SERPL-CCNC: 73 U/L (ref 40–150)
ALT SERPL W/O P-5'-P-CCNC: 19 U/L (ref 10–44)
ANION GAP SERPL CALC-SCNC: 6 MMOL/L (ref 8–16)
AST SERPL-CCNC: 21 U/L (ref 10–40)
BASOPHILS # BLD AUTO: 0.04 K/UL (ref 0–0.2)
BASOPHILS NFR BLD: 0.8 % (ref 0–1.9)
BILIRUB SERPL-MCNC: 0.5 MG/DL (ref 0.1–1)
BUN SERPL-MCNC: 17 MG/DL (ref 8–23)
CALCIUM SERPL-MCNC: 9.6 MG/DL (ref 8.7–10.5)
CHLORIDE SERPL-SCNC: 109 MMOL/L (ref 95–110)
CHOLEST SERPL-MCNC: 159 MG/DL (ref 120–199)
CHOLEST/HDLC SERPL: 2.7 {RATIO} (ref 2–5)
CO2 SERPL-SCNC: 27 MMOL/L (ref 23–29)
CREAT SERPL-MCNC: 0.9 MG/DL (ref 0.5–1.4)
DIFFERENTIAL METHOD BLD: ABNORMAL
EOSINOPHIL # BLD AUTO: 0.1 K/UL (ref 0–0.5)
EOSINOPHIL NFR BLD: 1.4 % (ref 0–8)
ERYTHROCYTE [DISTWIDTH] IN BLOOD BY AUTOMATED COUNT: 12.9 % (ref 11.5–14.5)
EST. GFR  (NO RACE VARIABLE): >60 ML/MIN/1.73 M^2
ESTIMATED AVG GLUCOSE: 111 MG/DL (ref 68–131)
GLUCOSE SERPL-MCNC: 108 MG/DL (ref 70–110)
HBA1C MFR BLD: 5.5 % (ref 4–5.6)
HCT VFR BLD AUTO: 43.6 % (ref 37–48.5)
HDLC SERPL-MCNC: 58 MG/DL (ref 40–75)
HDLC SERPL: 36.5 % (ref 20–50)
HGB BLD-MCNC: 14.5 G/DL (ref 12–16)
IMM GRANULOCYTES # BLD AUTO: 0.01 K/UL (ref 0–0.04)
IMM GRANULOCYTES NFR BLD AUTO: 0.2 % (ref 0–0.5)
LDLC SERPL CALC-MCNC: 83.4 MG/DL (ref 63–159)
LYMPHOCYTES # BLD AUTO: 1.4 K/UL (ref 1–4.8)
LYMPHOCYTES NFR BLD: 28.5 % (ref 18–48)
MCH RBC QN AUTO: 31.5 PG (ref 27–31)
MCHC RBC AUTO-ENTMCNC: 33.3 G/DL (ref 32–36)
MCV RBC AUTO: 95 FL (ref 82–98)
MONOCYTES # BLD AUTO: 0.4 K/UL (ref 0.3–1)
MONOCYTES NFR BLD: 7.2 % (ref 4–15)
NEUTROPHILS # BLD AUTO: 3 K/UL (ref 1.8–7.7)
NEUTROPHILS NFR BLD: 61.9 % (ref 38–73)
NONHDLC SERPL-MCNC: 101 MG/DL
NRBC BLD-RTO: 0 /100 WBC
PLATELET # BLD AUTO: 236 K/UL (ref 150–450)
PMV BLD AUTO: 10.2 FL (ref 9.2–12.9)
POTASSIUM SERPL-SCNC: 5.8 MMOL/L (ref 3.5–5.1)
PROT SERPL-MCNC: 6.6 G/DL (ref 6–8.4)
RBC # BLD AUTO: 4.61 M/UL (ref 4–5.4)
SODIUM SERPL-SCNC: 142 MMOL/L (ref 136–145)
TRIGL SERPL-MCNC: 88 MG/DL (ref 30–150)
TSH SERPL DL<=0.005 MIU/L-ACNC: 1.35 UIU/ML (ref 0.4–4)
WBC # BLD AUTO: 4.85 K/UL (ref 3.9–12.7)

## 2024-12-03 PROCEDURE — 36415 COLL VENOUS BLD VENIPUNCTURE: CPT | Mod: PO | Performed by: INTERNAL MEDICINE

## 2024-12-03 PROCEDURE — 80053 COMPREHEN METABOLIC PANEL: CPT | Performed by: INTERNAL MEDICINE

## 2024-12-03 PROCEDURE — 85025 COMPLETE CBC W/AUTO DIFF WBC: CPT | Performed by: INTERNAL MEDICINE

## 2024-12-03 PROCEDURE — 80061 LIPID PANEL: CPT | Performed by: INTERNAL MEDICINE

## 2024-12-03 PROCEDURE — 84443 ASSAY THYROID STIM HORMONE: CPT | Performed by: INTERNAL MEDICINE

## 2024-12-03 PROCEDURE — 83036 HEMOGLOBIN GLYCOSYLATED A1C: CPT | Performed by: INTERNAL MEDICINE

## 2024-12-12 ENCOUNTER — OFFICE VISIT (OUTPATIENT)
Dept: PRIMARY CARE CLINIC | Facility: CLINIC | Age: 74
End: 2024-12-12
Payer: MEDICARE

## 2024-12-12 VITALS
HEART RATE: 73 BPM | SYSTOLIC BLOOD PRESSURE: 118 MMHG | WEIGHT: 108.38 LBS | OXYGEN SATURATION: 95 % | BODY MASS INDEX: 18.5 KG/M2 | DIASTOLIC BLOOD PRESSURE: 72 MMHG | HEIGHT: 64 IN | TEMPERATURE: 99 F

## 2024-12-12 DIAGNOSIS — F33.9 DEPRESSION, RECURRENT: Primary | ICD-10-CM

## 2024-12-12 DIAGNOSIS — I34.1 MVP (MITRAL VALVE PROLAPSE): ICD-10-CM

## 2024-12-12 DIAGNOSIS — K59.9 BOWEL DYSFUNCTION: ICD-10-CM

## 2024-12-12 DIAGNOSIS — H81.11 BENIGN PAROXYSMAL POSITIONAL VERTIGO OF RIGHT EAR: ICD-10-CM

## 2024-12-12 DIAGNOSIS — H90.3 SENSORINEURAL HEARING LOSS (SNHL) OF BOTH EARS: ICD-10-CM

## 2024-12-12 DIAGNOSIS — R73.03 PREDIABETES: ICD-10-CM

## 2024-12-12 PROCEDURE — 1158F ADVNC CARE PLAN TLK DOCD: CPT | Mod: CPTII,S$GLB,, | Performed by: INTERNAL MEDICINE

## 2024-12-12 PROCEDURE — 1160F RVW MEDS BY RX/DR IN RCRD: CPT | Mod: CPTII,S$GLB,, | Performed by: INTERNAL MEDICINE

## 2024-12-12 PROCEDURE — 3044F HG A1C LEVEL LT 7.0%: CPT | Mod: CPTII,S$GLB,, | Performed by: INTERNAL MEDICINE

## 2024-12-12 PROCEDURE — 1159F MED LIST DOCD IN RCRD: CPT | Mod: CPTII,S$GLB,, | Performed by: INTERNAL MEDICINE

## 2024-12-12 PROCEDURE — 3288F FALL RISK ASSESSMENT DOCD: CPT | Mod: CPTII,S$GLB,, | Performed by: INTERNAL MEDICINE

## 2024-12-12 PROCEDURE — 3074F SYST BP LT 130 MM HG: CPT | Mod: CPTII,S$GLB,, | Performed by: INTERNAL MEDICINE

## 2024-12-12 PROCEDURE — 1101F PT FALLS ASSESS-DOCD LE1/YR: CPT | Mod: CPTII,S$GLB,, | Performed by: INTERNAL MEDICINE

## 2024-12-12 PROCEDURE — 99214 OFFICE O/P EST MOD 30 MIN: CPT | Mod: S$GLB,,, | Performed by: INTERNAL MEDICINE

## 2024-12-12 PROCEDURE — 1126F AMNT PAIN NOTED NONE PRSNT: CPT | Mod: CPTII,S$GLB,, | Performed by: INTERNAL MEDICINE

## 2024-12-12 PROCEDURE — 3078F DIAST BP <80 MM HG: CPT | Mod: CPTII,S$GLB,, | Performed by: INTERNAL MEDICINE

## 2024-12-12 PROCEDURE — 99999 PR PBB SHADOW E&M-EST. PATIENT-LVL IV: CPT | Mod: PBBFAC,,, | Performed by: INTERNAL MEDICINE

## 2024-12-12 NOTE — PROGRESS NOTES
"INTERNAL MEDICINE PROGRESS/URGENT CARE NOTE    CHIEF COMPLAINT     Chief Complaint   Patient presents with    Follow-up     Patient presents for her 3 month follow up appointment.       [facility]     Rosalia Fernández is a very tricia 73 y.o.  female who presents with a PMHx of  diverticulitis, melanoma, MVP, osteoarthritis. Who presents today for routine follow up visit.         Her concerns and questions today are: none       At her most recent visit, we discussed:   Increase in her anxiety due to family issues. Ie here mom recently passed and she's having issues with her brother. And her sister has been ill.   Left neck pain. chronic She does see PT and we agreed she would discuss this with them and have them give her some excercises which she says has really helped.   Still having soft  stools. Has an appointment with gastro. reviewed her colonscnoopy and is wnl done in 2021. Is now scheduled for a repeat colonsncopy in 2 weeks.  Reveiwed CT scan. Ordered all appropriate follow up. Ie pancreatic enzymes    Mass on her abdomen. Says there a bulge when she exercises. Has an US done which showed no hernia.    Changes in vision. Needs cataract surgery. Has just "been talking about it"    Forgetfulness. Says its nothing new. Its always been this way. Mentions neurology testing and she says she sees Dr. Nagy.   Toe pain in the am. Went away when she had ablation for her lower back pain.   still thinks something is wrong in her colon. Says she feels a bulge and that combined with her bowel movements really worries her. Completed a colonscnopt   Has a scheduled ablation for her back    Bitemporal pressures: has been seeing neurology. MRI shows no changes    Anxiety CARLEEN 7 today is 8/21 down from 18/21. Admittedly anxious but says due to her back issues which took a while     HLD: LDL is not at goal. Statin therapy agreed to today.      History of melanoma: advised to follow up with dermatologby    Home Medications:  Prior to " Admission medications    Medication Sig Start Date End Date Taking? Authorizing Provider   acetaminophen (TYLENOL) 500 MG tablet Take 1,000 mg by mouth every 6 (six) hours as needed for Pain.    Provider, Historical   ascorbic acid (VITAMIN C) 500 MG tablet Take 500 mg by mouth once daily.    Provider, Historical   azelastine (ASTELIN) 137 mcg (0.1 %) nasal spray 1 spray (137 mcg total) by Nasal route 2 (two) times daily. 9/26/23 9/25/24  Cheryl Samson MD   CALCIUM CITRATE/VITAMIN D3 (CALCIUM CITRATE + ORAL) Take 1 tablet by mouth Daily.    Provider, Historical   cholecalciferol, vitamin D3, (VITAMIN D3) 50 mcg (2,000 unit) Cap capsule Take 2,000 Units by mouth once daily.    Provider, Historical   coQ10, ubiquinol, 100 mg Cap Take 1 tablet by mouth once daily.    Provider, Historical   krill oil 500 mg Cap Take 2 capsules by mouth Daily.    Provider, Historical   LACTOBACILLUS ACIDOPHILUS (PROBIOTIC ORAL) Take 1 tablet by mouth Daily.    Provider, Historical   multivitamin (THERAGRAN) per tablet Take 1 tablet by mouth once daily.    Provider, Historical   psyllium (METAMUCIL) powder Take 1 packet by mouth 2 (two) times daily as needed.    Provider, Historical   rosuvastatin (CRESTOR) 10 MG tablet Take 1 tablet (10 mg total) by mouth once daily. 4/3/24 4/3/25  Sayda Barnhart MD       Review of Systems:  Review of Systems      Advance Care Planning     Date: 12/12/2024    Power of   I initiated the process of voluntary advance care planning today and explained the importance of this process to the patient.  I introduced the concept of advance directives to the patient, as well. Then the patient received detailed information about the importance of designating a Health Care Power of  (HCPOA). She was also instructed to communicate with this person about their wishes for future healthcare, should she become sick and lose decision-making capacity. The patient has previously appointed a HCPOA.  "After our discussion, the patient has decided to complete a HCPOA and has appointed her  on file , health care agent:  on file  & health care agent number:  on file . I encouraged her to communicate with this person about their wishes for future healthcare, should she become sick and lose decision-making capacity.      A total of 10 min was spent on advance care planning, goals of care discussion, emotional support, formulating and communicating prognosis and exploring burden/benefit of various approaches of treatment. This discussion occurred on a fully voluntary basis with the verbal consent of the patient and/or family.           PHYSICAL EXAM     /72 (BP Location: Left arm, Patient Position: Sitting)   Pulse 73   Temp 98.6 °F (37 °C)   Ht 5' 4" (1.626 m)   Wt 49.2 kg (108 lb 5.7 oz)   SpO2 95%   BMI 18.60 kg/m²     GEN - A+OX4, NAD   HEENT - PERRL, EOMI, OP clear  Neck - No thyromegaly or cervical LAD. No thyroid masses felt.  CV - RRR, no m/r   Chest - CTAB, no wheezing or rhonchi  Abd - S/NT/ND/+BS.   Ext - 2+BDP and radial pulses. No C/C/E.  Skin - No rash.    LABS       ASSESSMENT/PLAN     Rosalia Fernández is a 73 y.o. female with  1. Depression, recurrent  Overview:  Declines this diagnosis. Says its more stress and anxiety  Denies SI/HI  Declines medication and therapy. "doesn't need it"       2. Prediabetes  Overview:  Counseled on limiting carbs in her diet       3. Bowel dysfunction  Bowel have improved with metamucil    4. Benign paroxysmal positional vertigo of right ear  Overview:  Resolved   Encouraged head excercises      5. Sensorineural hearing loss (SNHL) of both ears  Overview:  With hearing aids which work well for her       6. MVP (mitral valve prolapse)  Overview:  Asymptomatic  Continue to monitor             WORRY SCORE2    RTC in 3 months, sooner if needed and depending on labs.    Sayda Barnhart MD  Board Certified Internist/Geriatrician  [facility] "

## 2025-01-14 ENCOUNTER — TELEPHONE (OUTPATIENT)
Dept: PRIMARY CARE CLINIC | Facility: CLINIC | Age: 75
End: 2025-01-14
Payer: MEDICARE

## 2025-01-14 NOTE — TELEPHONE ENCOUNTER
Patient called to let MD know she is having cataract surgery on 1/30/25 and needs her surgical clearance form signed. Patient was just seen for her routine visit 12/12/24, does she need a preop appointment? If not, patient plans on dropping the clearance form off today at the clinic.

## 2025-02-07 ENCOUNTER — TELEPHONE (OUTPATIENT)
Dept: OTOLARYNGOLOGY | Facility: CLINIC | Age: 75
End: 2025-02-07
Payer: MEDICARE

## 2025-02-07 NOTE — TELEPHONE ENCOUNTER
----- Message from Arielle sent at 2/7/2025  7:37 AM CST -----  Contact: Patient  Type:  Same Day Appointment Request    Caller is requesting a same day appointment.  Caller declined first available appointment listed below.      Name of Caller:  Patient  When is the first available appointment?  N/A    Symptoms:  woke up dizzy and dizziness during the night    Best Call Back Number:  [phone]    Additional Information:   States she needs to be seen today - please call - thank you

## 2025-02-12 ENCOUNTER — TELEPHONE (OUTPATIENT)
Dept: OTOLARYNGOLOGY | Facility: CLINIC | Age: 75
End: 2025-02-12

## 2025-02-12 ENCOUNTER — OFFICE VISIT (OUTPATIENT)
Dept: OTOLARYNGOLOGY | Facility: CLINIC | Age: 75
End: 2025-02-12
Payer: MEDICARE

## 2025-02-12 VITALS
WEIGHT: 108.25 LBS | DIASTOLIC BLOOD PRESSURE: 68 MMHG | SYSTOLIC BLOOD PRESSURE: 120 MMHG | BODY MASS INDEX: 18.58 KG/M2

## 2025-02-12 DIAGNOSIS — H81.10 BENIGN PAROXYSMAL POSITIONAL VERTIGO, UNSPECIFIED LATERALITY: Primary | ICD-10-CM

## 2025-02-12 PROCEDURE — 99999 PR PBB SHADOW E&M-EST. PATIENT-LVL III: CPT | Mod: PBBFAC,,, | Performed by: NURSE PRACTITIONER

## 2025-02-12 PROCEDURE — 99213 OFFICE O/P EST LOW 20 MIN: CPT | Mod: S$GLB,,, | Performed by: NURSE PRACTITIONER

## 2025-02-12 PROCEDURE — 3074F SYST BP LT 130 MM HG: CPT | Mod: CPTII,S$GLB,, | Performed by: NURSE PRACTITIONER

## 2025-02-12 PROCEDURE — 1126F AMNT PAIN NOTED NONE PRSNT: CPT | Mod: CPTII,S$GLB,, | Performed by: NURSE PRACTITIONER

## 2025-02-12 PROCEDURE — 3288F FALL RISK ASSESSMENT DOCD: CPT | Mod: CPTII,S$GLB,, | Performed by: NURSE PRACTITIONER

## 2025-02-12 PROCEDURE — 1101F PT FALLS ASSESS-DOCD LE1/YR: CPT | Mod: CPTII,S$GLB,, | Performed by: NURSE PRACTITIONER

## 2025-02-12 PROCEDURE — 1159F MED LIST DOCD IN RCRD: CPT | Mod: CPTII,S$GLB,, | Performed by: NURSE PRACTITIONER

## 2025-02-12 PROCEDURE — 3078F DIAST BP <80 MM HG: CPT | Mod: CPTII,S$GLB,, | Performed by: NURSE PRACTITIONER

## 2025-02-12 NOTE — TELEPHONE ENCOUNTER
----- Message from Odalis sent at 2/12/2025 11:11 AM CST -----  Contact: Self  Type: Needs Medical Advice  Who Called:  Patient  Best Call Back Number: [phone]    Additional Information: Pt is calling back in regards to her appt this morning with Caty Hood NP. Stated she had given her instructions on little exercises she can do at home that might help for vertigo and did not write them down. She is asking if we can ask her and call pt back to advise. Thank You.

## 2025-02-12 NOTE — PROGRESS NOTES
Subjective     Patient ID: Rosalia Fernández is a 74 y.o. female.    Chief Complaint: Dizziness    HPI  Patient was treated here for right-sided BPPV on 07/16/2021, 04/28/2023, and 09/26/2023.    Patient returns today for suspected BPPV. Patient reports that every morning for the past 4-5 mornings, she has felt vertigo upon getting up out of bed. Never experiences vertigo with laying down or turning over in bed. Never experiences vertigo with moving around throughout the day. Only with getting out of bed in the morning. States she sleeps on her right, but gets up on her left side.     Review of Systems   Constitutional: Negative.    HENT: Negative.     Eyes: Negative.    Respiratory: Negative.     Cardiovascular: Negative.    Gastrointestinal: Negative.    Musculoskeletal: Negative.    Integumentary:  Negative.   Neurological:  Positive for vertigo.   Hematological: Negative.    Psychiatric/Behavioral: Negative.            Objective     Physical Exam  Vitals and nursing note reviewed.   Constitutional:       General: She is not in acute distress.     Appearance: She is well-developed. She is not ill-appearing.   HENT:      Head: Normocephalic and atraumatic.      Right Ear: Hearing, tympanic membrane, ear canal and external ear normal. No middle ear effusion. Tympanic membrane is not erythematous.      Left Ear: Hearing, tympanic membrane, ear canal and external ear normal.  No middle ear effusion. Tympanic membrane is not erythematous.      Nose: Nose normal.   Eyes:      General: Lids are normal. No scleral icterus.        Right eye: No discharge.         Left eye: No discharge.   Neck:      Trachea: Trachea normal. No tracheal deviation.   Cardiovascular:      Rate and Rhythm: Normal rate.   Pulmonary:      Effort: Pulmonary effort is normal. No respiratory distress.      Breath sounds: No stridor. No wheezing.   Musculoskeletal:         General: Normal range of motion.      Cervical back: Normal range of motion  "and neck supple.   Skin:     General: Skin is warm and dry.   Neurological:      Mental Status: She is alert and oriented to person, place, and time.      Coordination: Coordination normal.      Gait: Gait normal.   Psychiatric:         Attention and Perception: Attention normal.         Mood and Affect: Mood normal.         Speech: Speech normal.         Behavior: Behavior normal. Behavior is cooperative.     Negative Rakesh-Hallpike AU     Assessment and Plan     1. Benign paroxysmal positional vertigo, unspecified laterality      Patient had no evidence of any BPPV bilaterally today, but asked that I go ahead and perform Epley maneuver. She stated she felt "a slight something" on the right, so right-sided Epley was done today. Encouraged patient to stay off her right side X one week, but explained that I did not see any evidence of BPPV, so it might not be that. She reports that she just had cataract surgery and is questioning whether it has something to do with her eyes. Encouraged her to discuss with her eye doctor if continues. Discussed how to perform Rakesh-Hallpike at home to determine laterality if BPPV. All questions answered. I had a long discussion with the patient regarding disease process, treatment and outcome.   Patient encouraged to return to clinic if symptoms worsen/persist and as needed for further ENT symptoms or concerns.              No follow-ups on file.        "

## 2025-02-12 NOTE — TELEPHONE ENCOUNTER
S/w pt and she states that she has memory problems and she cannot remember what exercises Caty told her to do at home. Pt states that she needs Caty to call her so she can write everything down. Please contact pt to discuss instructions.

## 2025-02-24 DIAGNOSIS — Z00.00 ENCOUNTER FOR MEDICARE ANNUAL WELLNESS EXAM: ICD-10-CM

## 2025-03-11 ENCOUNTER — OFFICE VISIT (OUTPATIENT)
Dept: PRIMARY CARE CLINIC | Facility: CLINIC | Age: 75
End: 2025-03-11
Payer: MEDICARE

## 2025-03-11 VITALS
HEART RATE: 74 BPM | BODY MASS INDEX: 18.36 KG/M2 | OXYGEN SATURATION: 99 % | WEIGHT: 107.56 LBS | DIASTOLIC BLOOD PRESSURE: 74 MMHG | TEMPERATURE: 99 F | HEIGHT: 64 IN | SYSTOLIC BLOOD PRESSURE: 134 MMHG

## 2025-03-11 DIAGNOSIS — M85.89 OSTEOPENIA OF MULTIPLE SITES: ICD-10-CM

## 2025-03-11 DIAGNOSIS — H81.11 BENIGN PAROXYSMAL POSITIONAL VERTIGO OF RIGHT EAR: ICD-10-CM

## 2025-03-11 DIAGNOSIS — E78.2 MIXED HYPERLIPIDEMIA: ICD-10-CM

## 2025-03-11 DIAGNOSIS — R53.83 FATIGUE, UNSPECIFIED TYPE: ICD-10-CM

## 2025-03-11 DIAGNOSIS — R73.03 PREDIABETES: Primary | ICD-10-CM

## 2025-03-11 PROCEDURE — 99214 OFFICE O/P EST MOD 30 MIN: CPT | Mod: S$GLB,,, | Performed by: INTERNAL MEDICINE

## 2025-03-11 PROCEDURE — 1160F RVW MEDS BY RX/DR IN RCRD: CPT | Mod: CPTII,S$GLB,, | Performed by: INTERNAL MEDICINE

## 2025-03-11 PROCEDURE — 3078F DIAST BP <80 MM HG: CPT | Mod: CPTII,S$GLB,, | Performed by: INTERNAL MEDICINE

## 2025-03-11 PROCEDURE — 3288F FALL RISK ASSESSMENT DOCD: CPT | Mod: CPTII,S$GLB,, | Performed by: INTERNAL MEDICINE

## 2025-03-11 PROCEDURE — 3075F SYST BP GE 130 - 139MM HG: CPT | Mod: CPTII,S$GLB,, | Performed by: INTERNAL MEDICINE

## 2025-03-11 PROCEDURE — 99999 PR PBB SHADOW E&M-EST. PATIENT-LVL IV: CPT | Mod: PBBFAC,,, | Performed by: INTERNAL MEDICINE

## 2025-03-11 PROCEDURE — 1158F ADVNC CARE PLAN TLK DOCD: CPT | Mod: CPTII,S$GLB,, | Performed by: INTERNAL MEDICINE

## 2025-03-11 PROCEDURE — 1126F AMNT PAIN NOTED NONE PRSNT: CPT | Mod: CPTII,S$GLB,, | Performed by: INTERNAL MEDICINE

## 2025-03-11 PROCEDURE — 1159F MED LIST DOCD IN RCRD: CPT | Mod: CPTII,S$GLB,, | Performed by: INTERNAL MEDICINE

## 2025-03-11 PROCEDURE — 1101F PT FALLS ASSESS-DOCD LE1/YR: CPT | Mod: CPTII,S$GLB,, | Performed by: INTERNAL MEDICINE

## 2025-03-11 RX ORDER — AZELASTINE 1 MG/ML
1 SPRAY, METERED NASAL 2 TIMES DAILY
Qty: 30 ML | Refills: 6 | Status: SHIPPED | OUTPATIENT
Start: 2025-03-11 | End: 2026-03-11

## 2025-03-11 RX ORDER — ROSUVASTATIN CALCIUM 10 MG/1
10 TABLET, COATED ORAL DAILY
Qty: 90 TABLET | Refills: 3 | Status: SHIPPED | OUTPATIENT
Start: 2025-03-11 | End: 2026-03-11
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
## 2025-03-21 ENCOUNTER — PATIENT MESSAGE (OUTPATIENT)
Dept: RESEARCH | Facility: HOSPITAL | Age: 75
End: 2025-03-21
Payer: MEDICARE

## 2025-03-28 ENCOUNTER — TELEPHONE (OUTPATIENT)
Dept: PRIMARY CARE CLINIC | Facility: CLINIC | Age: 75
End: 2025-03-28
Payer: MEDICARE

## 2025-03-28 NOTE — TELEPHONE ENCOUNTER
Patient complains of a dry cough and some clear nasal drainage x4 days, but denied fever, headache and SOB. Patient already uses Flonase, so I recommended she start OTC antihistamine and Mucinex. Patient voiced understanding and will follow up if symptoms persist or worsen.

## 2025-05-07 ENCOUNTER — OFFICE VISIT (OUTPATIENT)
Dept: PRIMARY CARE CLINIC | Facility: CLINIC | Age: 75
End: 2025-05-07
Payer: MEDICARE

## 2025-05-07 VITALS
WEIGHT: 107.06 LBS | DIASTOLIC BLOOD PRESSURE: 68 MMHG | HEART RATE: 67 BPM | HEIGHT: 64 IN | BODY MASS INDEX: 18.28 KG/M2 | SYSTOLIC BLOOD PRESSURE: 124 MMHG | OXYGEN SATURATION: 98 %

## 2025-05-07 DIAGNOSIS — R73.03 PREDIABETES: ICD-10-CM

## 2025-05-07 DIAGNOSIS — I34.1 MVP (MITRAL VALVE PROLAPSE): ICD-10-CM

## 2025-05-07 DIAGNOSIS — R63.6 UNDERWEIGHT: ICD-10-CM

## 2025-05-07 DIAGNOSIS — Z00.00 ENCOUNTER FOR MEDICARE ANNUAL WELLNESS EXAM: Primary | ICD-10-CM

## 2025-05-07 DIAGNOSIS — H90.3 SENSORINEURAL HEARING LOSS (SNHL) OF BOTH EARS: ICD-10-CM

## 2025-05-07 DIAGNOSIS — E78.2 MIXED HYPERLIPIDEMIA: ICD-10-CM

## 2025-05-07 PROCEDURE — 99999 PR PBB SHADOW E&M-EST. PATIENT-LVL IV: CPT | Mod: PBBFAC,,, | Performed by: PHYSICIAN ASSISTANT

## 2025-05-07 NOTE — PROGRESS NOTES
"  Rosalia Fernández presented for a follow-up Medicare AWV today. The following components were reviewed and updated:    Medical history  Family History  Social history  Allergies and Current Medications  Health Risk Assessment  Health Maintenance  Care Team    **See Completed Assessments for Annual Wellness visit with in the encounter summary    The following assessments were completed:  Depression Screening  Cognitive function Screening  Timed Get Up Test  Whisper Test        Opioid documentation:      Patient does not have a current opioid prescription.          Vitals:    05/07/25 0944   BP: 124/68   Pulse: 67   SpO2: 98%   Weight: 48.5 kg (107 lb 0.5 oz)   Height: 5' 4" (1.626 m)     Body mass index is 18.37 kg/m².       Physical Exam  Vitals and nursing note reviewed.   Constitutional:       General: She is not in acute distress.     Appearance: Normal appearance. She is not ill-appearing.   HENT:      Head: Normocephalic and atraumatic.      Right Ear: External ear normal.      Left Ear: External ear normal.   Eyes:      General:         Right eye: No discharge.         Left eye: No discharge.      Extraocular Movements: Extraocular movements intact.      Conjunctiva/sclera: Conjunctivae normal.   Cardiovascular:      Rate and Rhythm: Normal rate and regular rhythm.   Pulmonary:      Effort: Pulmonary effort is normal. No respiratory distress.   Skin:     General: Skin is warm and dry.      Coloration: Skin is not jaundiced or pale.   Neurological:      Mental Status: She is alert.   Psychiatric:         Mood and Affect: Mood normal.         Behavior: Behavior normal.         Thought Content: Thought content normal.         Judgment: Judgment normal.           Diagnoses and health risks identified today and associated recommendations/orders:  1. Encounter for Medicare annual wellness exam  AWV completed today  UTD on all HCM    2. Underweight  Stable  Has been same weight for at least 2 years  Followed by PCP    3. " Sensorineural hearing loss (SNHL) of both ears  Wears hearing aids  Can her well with aids  Followed by ENT    4. MVP (mitral valve prolapse)  Asymptomatic  No chest pain or palpitations  Followed by PCP    5. Mixed hyperlipidemia  Stable  Last lipid profile WNL  Takes Crestor 10 mg  Followed by PCP    6. Prediabetes  Stable  Last A1c 5.5  Diet controlled  Followed by PCP      Provided Rosalia with a 5-10 year written screening schedule and personal prevention plan. Recommendations were developed using the USPSTF age appropriate recommendations. Education, counseling, and referrals were provided as needed.  After Visit Summary printed and given to patient which includes a list of additional screenings\tests needed.    No follow-ups on file.      FLORENCIO Martin  I offered to discuss advanced care planning, including how to pick a person who would make decisions for you if you were unable to make them for yourself, called a health care power of , and what kind of decisions you might make such as use of life sustaining treatments such as ventilators and tube feeding when faced with a life limiting illness recorded on a living will that they will need to know. (How you want to be cared for as you near the end of your natural life)     X Patient is interested in learning more about how to make advanced directives.  I provided them paperwork and offered to discuss this with them.

## 2025-05-07 NOTE — PATIENT INSTRUCTIONS
Counseling and Referral of Other Preventative  (Italic type indicates deductible and co-insurance are waived)    Patient Name: Rosalia Fernández  Today's Date: 5/7/2025    Health Maintenance       Date Due Completion Date    DEXA Scan 09/30/2025 9/30/2022    Hemoglobin A1c (Prediabetes) 12/03/2025 12/3/2024    TETANUS VACCINE 05/16/2029 5/16/2019    Lipid Panel 12/03/2029 12/3/2024        No orders of the defined types were placed in this encounter.    The following information is provided to all patients.  This information is to help you find resources for any of the problems found today that may be affecting your health:                  Living healthy guide: www.[facility].[place].gov      Understanding Diabetes: www.diabetes.org      Eating healthy: www.cdc.gov/healthyweight      CDC home safety checklist: www.cdc.gov/steadi/patient.html      Agency on Aging: www.goea.[place].[facility]      Alcoholics anonymous (AA): www.aa.org      Physical Activity: www.[place].nih.gov/mq1chin      Tobacco use: www.quitwithusla.org

## (undated) DEVICE — CANNULA VENOM 20G 10X100MM

## (undated) DEVICE — GLOVE 7.5 PROTEXIS PI MICRO

## (undated) DEVICE — APPLICATOR CHLORAPREP CLR 10.5

## (undated) DEVICE — TOWEL OR DISP STRL BLUE 4/PK

## (undated) DEVICE — TRAY NERVE BLOCK

## (undated) DEVICE — NDL SPINAL 25GX3.5 SPINOCAN

## (undated) DEVICE — PAD ELECTROSURGICAL PAT PLATE